# Patient Record
Sex: FEMALE | Race: ASIAN | NOT HISPANIC OR LATINO | Employment: UNEMPLOYED | URBAN - METROPOLITAN AREA
[De-identification: names, ages, dates, MRNs, and addresses within clinical notes are randomized per-mention and may not be internally consistent; named-entity substitution may affect disease eponyms.]

---

## 2017-05-20 ENCOUNTER — COMMUNICATION - HEALTHEAST (OUTPATIENT)
Dept: FAMILY MEDICINE | Facility: CLINIC | Age: 37
End: 2017-05-20

## 2017-05-20 DIAGNOSIS — Z30.9 CONTRACEPTIVE MANAGEMENT: ICD-10-CM

## 2017-09-19 ENCOUNTER — OFFICE VISIT - HEALTHEAST (OUTPATIENT)
Dept: FAMILY MEDICINE | Facility: CLINIC | Age: 37
End: 2017-09-19

## 2017-09-19 DIAGNOSIS — M54.2 NECK PAIN: ICD-10-CM

## 2017-09-19 DIAGNOSIS — Z23 NEED FOR IMMUNIZATION AGAINST INFLUENZA: ICD-10-CM

## 2017-09-19 DIAGNOSIS — R53.83 FATIGUE: ICD-10-CM

## 2017-09-19 ASSESSMENT — MIFFLIN-ST. JEOR: SCORE: 1425.65

## 2018-01-16 ENCOUNTER — COMMUNICATION - HEALTHEAST (OUTPATIENT)
Dept: FAMILY MEDICINE | Facility: CLINIC | Age: 38
End: 2018-01-16

## 2018-01-16 DIAGNOSIS — Z30.9 CONTRACEPTIVE MANAGEMENT: ICD-10-CM

## 2018-02-06 ENCOUNTER — OFFICE VISIT - HEALTHEAST (OUTPATIENT)
Dept: FAMILY MEDICINE | Facility: CLINIC | Age: 38
End: 2018-02-06

## 2018-02-06 DIAGNOSIS — K21.9 GASTROESOPHAGEAL REFLUX DISEASE WITHOUT ESOPHAGITIS: ICD-10-CM

## 2018-02-06 ASSESSMENT — MIFFLIN-ST. JEOR: SCORE: 1421.11

## 2018-02-07 ENCOUNTER — OFFICE VISIT - HEALTHEAST (OUTPATIENT)
Dept: FAMILY MEDICINE | Facility: CLINIC | Age: 38
End: 2018-02-07

## 2018-02-07 DIAGNOSIS — R41.3 MEMORY DIFFICULTIES: ICD-10-CM

## 2018-02-07 DIAGNOSIS — R12 HEARTBURN: ICD-10-CM

## 2018-02-07 ASSESSMENT — MIFFLIN-ST. JEOR: SCORE: 1422.53

## 2018-03-30 ENCOUNTER — OFFICE VISIT - HEALTHEAST (OUTPATIENT)
Dept: FAMILY MEDICINE | Facility: CLINIC | Age: 38
End: 2018-03-30

## 2018-03-30 DIAGNOSIS — R10.13 EPIGASTRIC ABDOMINAL PAIN: ICD-10-CM

## 2018-03-30 ASSESSMENT — MIFFLIN-ST. JEOR: SCORE: 1421.4

## 2018-04-06 ENCOUNTER — AMBULATORY - HEALTHEAST (OUTPATIENT)
Dept: NURSING | Facility: CLINIC | Age: 38
End: 2018-04-06

## 2018-04-13 ENCOUNTER — RECORDS - HEALTHEAST (OUTPATIENT)
Dept: ADMINISTRATIVE | Facility: OTHER | Age: 38
End: 2018-04-13

## 2018-04-16 ENCOUNTER — RECORDS - HEALTHEAST (OUTPATIENT)
Dept: ADMINISTRATIVE | Facility: OTHER | Age: 38
End: 2018-04-16

## 2018-05-30 ENCOUNTER — OFFICE VISIT - HEALTHEAST (OUTPATIENT)
Dept: FAMILY MEDICINE | Facility: CLINIC | Age: 38
End: 2018-05-30

## 2018-05-30 DIAGNOSIS — R12 HEARTBURN: ICD-10-CM

## 2018-05-30 ASSESSMENT — MIFFLIN-ST. JEOR: SCORE: 1436.99

## 2018-11-23 ENCOUNTER — COMMUNICATION - HEALTHEAST (OUTPATIENT)
Dept: FAMILY MEDICINE | Facility: CLINIC | Age: 38
End: 2018-11-23

## 2018-12-05 ENCOUNTER — OFFICE VISIT - HEALTHEAST (OUTPATIENT)
Dept: FAMILY MEDICINE | Facility: CLINIC | Age: 38
End: 2018-12-05

## 2018-12-05 DIAGNOSIS — Z13.220 SCREENING FOR LIPID DISORDERS: ICD-10-CM

## 2018-12-05 DIAGNOSIS — Z00.00 ROUTINE HISTORY AND PHYSICAL EXAMINATION OF ADULT: ICD-10-CM

## 2018-12-05 DIAGNOSIS — M25.511 RIGHT SHOULDER PAIN, UNSPECIFIED CHRONICITY: ICD-10-CM

## 2018-12-05 DIAGNOSIS — K29.40 ATROPHIC GASTRITIS WITHOUT HEMORRHAGE: ICD-10-CM

## 2018-12-05 DIAGNOSIS — H53.8 BLURRED VISION: ICD-10-CM

## 2018-12-05 LAB
CHOLEST SERPL-MCNC: 174 MG/DL
FASTING STATUS PATIENT QL REPORTED: NO
HDLC SERPL-MCNC: 48 MG/DL
LDLC SERPL CALC-MCNC: 111 MG/DL
TRIGL SERPL-MCNC: 77 MG/DL

## 2018-12-05 ASSESSMENT — MIFFLIN-ST. JEOR: SCORE: 1400.42

## 2018-12-15 ENCOUNTER — RECORDS - HEALTHEAST (OUTPATIENT)
Dept: ADMINISTRATIVE | Facility: OTHER | Age: 38
End: 2018-12-15

## 2019-09-30 ENCOUNTER — COMMUNICATION - HEALTHEAST (OUTPATIENT)
Dept: FAMILY MEDICINE | Facility: CLINIC | Age: 39
End: 2019-09-30

## 2019-10-18 ENCOUNTER — AMBULATORY - HEALTHEAST (OUTPATIENT)
Dept: FAMILY MEDICINE | Facility: CLINIC | Age: 39
End: 2019-10-18

## 2019-10-22 ENCOUNTER — OFFICE VISIT - HEALTHEAST (OUTPATIENT)
Dept: FAMILY MEDICINE | Facility: CLINIC | Age: 39
End: 2019-10-22

## 2019-10-22 ENCOUNTER — RECORDS - HEALTHEAST (OUTPATIENT)
Dept: GENERAL RADIOLOGY | Facility: CLINIC | Age: 39
End: 2019-10-22

## 2019-10-22 DIAGNOSIS — R13.19 OTHER DYSPHAGIA: ICD-10-CM

## 2019-10-22 DIAGNOSIS — Z00.00 HEALTHCARE MAINTENANCE: ICD-10-CM

## 2019-10-22 DIAGNOSIS — R76.8 HELICOBACTER PYLORI ANTIBODY POSITIVE: ICD-10-CM

## 2019-10-22 DIAGNOSIS — B18.1 CHRONIC VIRAL HEPATITIS B WITHOUT DELTA AGENT AND WITHOUT COMA (H): ICD-10-CM

## 2019-10-22 DIAGNOSIS — R30.0 DYSURIA: ICD-10-CM

## 2019-10-22 DIAGNOSIS — K21.00 GASTROESOPHAGEAL REFLUX DISEASE WITH ESOPHAGITIS: ICD-10-CM

## 2019-10-22 DIAGNOSIS — R09.A2 FOREIGN BODY SENSATION IN THROAT: ICD-10-CM

## 2019-10-22 DIAGNOSIS — R09.89 OTHER SPECIFIED SYMPTOMS AND SIGNS INVOLVING THE CIRCULATORY AND RESPIRATORY SYSTEMS: ICD-10-CM

## 2019-10-22 LAB
ALBUMIN UR-MCNC: NEGATIVE MG/DL
APPEARANCE UR: ABNORMAL
BACTERIA #/AREA URNS HPF: ABNORMAL HPF
BILIRUB UR QL STRIP: NEGATIVE
CLUE CELLS: NORMAL
COLOR UR AUTO: YELLOW
GLUCOSE UR STRIP-MCNC: NEGATIVE MG/DL
HCG UR QL: NEGATIVE
HGB UR QL STRIP: NEGATIVE
KETONES UR STRIP-MCNC: NEGATIVE MG/DL
LEUKOCYTE ESTERASE UR QL STRIP: NEGATIVE
NITRATE UR QL: NEGATIVE
PH UR STRIP: 6 [PH] (ref 5–8)
RBC #/AREA URNS AUTO: ABNORMAL HPF
SP GR UR STRIP: 1.02 (ref 1–1.03)
SQUAMOUS #/AREA URNS AUTO: ABNORMAL LPF
TRICHOMONAS, WET PREP: NORMAL
UROBILINOGEN UR STRIP-ACNC: ABNORMAL
WBC #/AREA URNS AUTO: ABNORMAL HPF
YEAST, WET PREP: NORMAL

## 2019-10-22 ASSESSMENT — MIFFLIN-ST. JEOR: SCORE: 1364.42

## 2019-10-23 LAB — BACTERIA SPEC CULT: NO GROWTH

## 2019-10-24 ENCOUNTER — COMMUNICATION - HEALTHEAST (OUTPATIENT)
Dept: FAMILY MEDICINE | Facility: CLINIC | Age: 39
End: 2019-10-24

## 2019-11-01 ENCOUNTER — RECORDS - HEALTHEAST (OUTPATIENT)
Dept: ADMINISTRATIVE | Facility: OTHER | Age: 39
End: 2019-11-01

## 2019-11-11 ENCOUNTER — COMMUNICATION - HEALTHEAST (OUTPATIENT)
Dept: FAMILY MEDICINE | Facility: CLINIC | Age: 39
End: 2019-11-11

## 2019-11-17 ENCOUNTER — HOSPITAL ENCOUNTER (OUTPATIENT)
Dept: CT IMAGING | Facility: HOSPITAL | Age: 39
Discharge: HOME | End: 2019-11-17
Attending: INTERNAL MEDICINE

## 2019-11-17 DIAGNOSIS — C16.9 MALIGNANT NEOPLASM OF STOMACH, UNSPECIFIED LOCATION (H): ICD-10-CM

## 2019-11-21 ENCOUNTER — RECORDS - HEALTHEAST (OUTPATIENT)
Dept: ADMINISTRATIVE | Facility: OTHER | Age: 39
End: 2019-11-21

## 2019-11-22 ENCOUNTER — COMMUNICATION - HEALTHEAST (OUTPATIENT)
Dept: INTERVENTIONAL RADIOLOGY/VASCULAR | Facility: HOSPITAL | Age: 39
End: 2019-11-22

## 2019-11-22 ENCOUNTER — HOSPITAL ENCOUNTER (OUTPATIENT)
Dept: INTERVENTIONAL RADIOLOGY/VASCULAR | Facility: HOSPITAL | Age: 39
Discharge: HOME | End: 2019-11-22
Attending: INTERNAL MEDICINE | Admitting: RADIOLOGY

## 2019-11-22 DIAGNOSIS — C16.9 GASTRIC CANCER (H): ICD-10-CM

## 2019-11-22 LAB — HCG UR QL: NEGATIVE

## 2019-11-22 ASSESSMENT — MIFFLIN-ST. JEOR: SCORE: 1362.15

## 2019-12-13 ENCOUNTER — COMMUNICATION - HEALTHEAST (OUTPATIENT)
Dept: NURSING | Facility: CLINIC | Age: 39
End: 2019-12-13

## 2019-12-13 ENCOUNTER — OFFICE VISIT - HEALTHEAST (OUTPATIENT)
Dept: FAMILY MEDICINE | Facility: CLINIC | Age: 39
End: 2019-12-13

## 2019-12-13 DIAGNOSIS — C16.9 MALIGNANT NEOPLASM OF STOMACH, UNSPECIFIED LOCATION (H): ICD-10-CM

## 2019-12-13 DIAGNOSIS — Z00.00 ROUTINE GENERAL MEDICAL EXAMINATION AT A HEALTH CARE FACILITY: ICD-10-CM

## 2019-12-13 DIAGNOSIS — Z23 NEED FOR INFLUENZA VACCINATION: ICD-10-CM

## 2019-12-13 ASSESSMENT — MIFFLIN-ST. JEOR: SCORE: 1353.67

## 2019-12-13 ASSESSMENT — PATIENT HEALTH QUESTIONNAIRE - PHQ9: SUM OF ALL RESPONSES TO PHQ QUESTIONS 1-9: 24

## 2019-12-17 ENCOUNTER — COMMUNICATION - HEALTHEAST (OUTPATIENT)
Dept: CARE COORDINATION | Facility: CLINIC | Age: 39
End: 2019-12-17

## 2019-12-17 ENCOUNTER — AMBULATORY - HEALTHEAST (OUTPATIENT)
Dept: NURSING | Facility: CLINIC | Age: 39
End: 2019-12-17

## 2019-12-17 ASSESSMENT — ACTIVITIES OF DAILY LIVING (ADL): DEPENDENT_IADLS:: INDEPENDENT

## 2019-12-31 ENCOUNTER — RECORDS - HEALTHEAST (OUTPATIENT)
Dept: ADMINISTRATIVE | Facility: OTHER | Age: 39
End: 2019-12-31

## 2020-01-07 ENCOUNTER — RECORDS - HEALTHEAST (OUTPATIENT)
Dept: MAMMOGRAPHY | Facility: CLINIC | Age: 40
End: 2020-01-07

## 2020-01-07 DIAGNOSIS — Z12.31 ENCOUNTER FOR SCREENING MAMMOGRAM FOR MALIGNANT NEOPLASM OF BREAST: ICD-10-CM

## 2020-01-09 ENCOUNTER — COMMUNICATION - HEALTHEAST (OUTPATIENT)
Dept: NURSING | Facility: CLINIC | Age: 40
End: 2020-01-09

## 2020-01-10 ENCOUNTER — AMBULATORY - HEALTHEAST (OUTPATIENT)
Dept: NURSING | Facility: CLINIC | Age: 40
End: 2020-01-10

## 2020-01-10 ENCOUNTER — COMMUNICATION - HEALTHEAST (OUTPATIENT)
Dept: NURSING | Facility: CLINIC | Age: 40
End: 2020-01-10

## 2020-01-21 ENCOUNTER — COMMUNICATION - HEALTHEAST (OUTPATIENT)
Dept: FAMILY MEDICINE | Facility: CLINIC | Age: 40
End: 2020-01-21

## 2020-01-27 ENCOUNTER — OFFICE VISIT - HEALTHEAST (OUTPATIENT)
Dept: FAMILY MEDICINE | Facility: CLINIC | Age: 40
End: 2020-01-27

## 2020-01-27 DIAGNOSIS — Z60.3 LANGUAGE BARRIER AFFECTING HEALTH CARE: ICD-10-CM

## 2020-01-27 DIAGNOSIS — R76.8 HELICOBACTER PYLORI ANTIBODY POSITIVE: ICD-10-CM

## 2020-01-27 DIAGNOSIS — K92.2 GASTROINTESTINAL HEMORRHAGE, UNSPECIFIED GASTROINTESTINAL HEMORRHAGE TYPE: ICD-10-CM

## 2020-01-27 DIAGNOSIS — Z00.00 HEALTHCARE MAINTENANCE: ICD-10-CM

## 2020-01-27 DIAGNOSIS — Z75.8 LANGUAGE BARRIER AFFECTING HEALTH CARE: ICD-10-CM

## 2020-01-27 DIAGNOSIS — B18.1 CHRONIC VIRAL HEPATITIS B WITHOUT DELTA AGENT AND WITHOUT COMA (H): ICD-10-CM

## 2020-01-27 DIAGNOSIS — C16.9 MALIGNANT NEOPLASM OF STOMACH, UNSPECIFIED LOCATION (H): ICD-10-CM

## 2020-01-27 DIAGNOSIS — D62 ANEMIA DUE TO BLOOD LOSS, ACUTE: ICD-10-CM

## 2020-01-27 DIAGNOSIS — Z09 HOSPITAL DISCHARGE FOLLOW-UP: ICD-10-CM

## 2020-01-27 LAB
ALBUMIN SERPL-MCNC: 3.1 G/DL (ref 3.5–5)
ALP SERPL-CCNC: 80 U/L (ref 45–120)
ALT SERPL W P-5'-P-CCNC: 88 U/L (ref 0–45)
ANION GAP SERPL CALCULATED.3IONS-SCNC: 7 MMOL/L (ref 5–18)
AST SERPL W P-5'-P-CCNC: 68 U/L (ref 0–40)
BASOPHILS # BLD AUTO: 0 THOU/UL (ref 0–0.2)
BASOPHILS NFR BLD AUTO: 0 % (ref 0–2)
BILIRUB DIRECT SERPL-MCNC: 0.1 MG/DL
BILIRUB SERPL-MCNC: 0.3 MG/DL (ref 0–1)
BUN SERPL-MCNC: 6 MG/DL (ref 8–22)
CALCIUM SERPL-MCNC: 8.3 MG/DL (ref 8.5–10.5)
CHLORIDE BLD-SCNC: 111 MMOL/L (ref 98–107)
CO2 SERPL-SCNC: 24 MMOL/L (ref 22–31)
CREAT SERPL-MCNC: 0.54 MG/DL (ref 0.6–1.1)
EOSINOPHIL COUNT (ABSOLUTE): 0.1 THOU/UL (ref 0–0.4)
EOSINOPHIL NFR BLD AUTO: 3 % (ref 0–6)
ERYTHROCYTE [DISTWIDTH] IN BLOOD BY AUTOMATED COUNT: 19.9 % (ref 11–14.5)
GFR SERPL CREATININE-BSD FRML MDRD: >60 ML/MIN/1.73M2
GLUCOSE BLD-MCNC: 99 MG/DL (ref 70–125)
HCT VFR BLD AUTO: 28.3 % (ref 35–47)
HGB BLD-MCNC: 8.7 G/DL (ref 12–16)
HIV 1+2 AB+HIV1 P24 AG SERPL QL IA: NEGATIVE
LYMPHOCYTES # BLD AUTO: 1 THOU/UL (ref 0.8–4.4)
LYMPHOCYTES NFR BLD AUTO: 29 % (ref 20–40)
MCH RBC QN AUTO: 23.9 PG (ref 27–34)
MCHC RBC AUTO-ENTMCNC: 30.7 G/DL (ref 32–36)
MCV RBC AUTO: 78 FL (ref 80–100)
MONOCYTES # BLD AUTO: 1.1 THOU/UL (ref 0–0.9)
MONOCYTES NFR BLD AUTO: 30 % (ref 2–10)
OVALOCYTES: ABNORMAL
PLAT MORPH BLD: ABNORMAL
PLATELET # BLD AUTO: 124 THOU/UL (ref 140–440)
PMV BLD AUTO: ABNORMAL FL
POLYCHROMASIA BLD QL SMEAR: ABNORMAL
POTASSIUM BLD-SCNC: 3.9 MMOL/L (ref 3.5–5)
PROT SERPL-MCNC: 5.4 G/DL (ref 6–8)
RBC # BLD AUTO: 3.64 MILL/UL (ref 3.8–5.4)
SCHISTOCYTES: ABNORMAL
SODIUM SERPL-SCNC: 142 MMOL/L (ref 136–145)
TOTAL NEUTROPHILS-ABS(DIFF): 1.3 THOU/UL (ref 2–7.7)
TOTAL NEUTROPHILS-REL(DIFF): 38 % (ref 50–70)
WBC: 3.5 THOU/UL (ref 4–11)

## 2020-01-27 SDOH — SOCIAL STABILITY - SOCIAL INSECURITY: ACCULTURATION DIFFICULTY: Z60.3

## 2020-01-27 ASSESSMENT — MIFFLIN-ST. JEOR: SCORE: 1318.93

## 2020-01-28 ENCOUNTER — RECORDS - HEALTHEAST (OUTPATIENT)
Dept: ADMINISTRATIVE | Facility: OTHER | Age: 40
End: 2020-01-28

## 2020-01-29 ENCOUNTER — COMMUNICATION - HEALTHEAST (OUTPATIENT)
Dept: ADMINISTRATIVE | Facility: CLINIC | Age: 40
End: 2020-01-29

## 2020-01-29 LAB
HBV CORE AB SERPL QL IA: POSITIVE
HEPATITIS B SURFACE ANTIBODY LHE- HISTORICAL: NEGATIVE

## 2020-02-12 ENCOUNTER — OFFICE VISIT - HEALTHEAST (OUTPATIENT)
Dept: FAMILY MEDICINE | Facility: CLINIC | Age: 40
End: 2020-02-12

## 2020-02-12 DIAGNOSIS — C16.9 MALIGNANT NEOPLASM OF STOMACH, UNSPECIFIED LOCATION (H): ICD-10-CM

## 2020-02-12 DIAGNOSIS — Z01.818 PREOPERATIVE EXAMINATION: ICD-10-CM

## 2020-02-12 ASSESSMENT — MIFFLIN-ST. JEOR: SCORE: 1359.23

## 2020-02-13 ENCOUNTER — RECORDS - HEALTHEAST (OUTPATIENT)
Dept: ADMINISTRATIVE | Facility: OTHER | Age: 40
End: 2020-02-13

## 2020-02-13 ENCOUNTER — COMMUNICATION - HEALTHEAST (OUTPATIENT)
Dept: NURSING | Facility: CLINIC | Age: 40
End: 2020-02-13

## 2020-02-25 ENCOUNTER — RECORDS - HEALTHEAST (OUTPATIENT)
Dept: ADMINISTRATIVE | Facility: OTHER | Age: 40
End: 2020-02-25

## 2020-02-28 ENCOUNTER — COMMUNICATION - HEALTHEAST (OUTPATIENT)
Dept: FAMILY MEDICINE | Facility: CLINIC | Age: 40
End: 2020-02-28

## 2020-02-28 ENCOUNTER — OFFICE VISIT - HEALTHEAST (OUTPATIENT)
Dept: FAMILY MEDICINE | Facility: CLINIC | Age: 40
End: 2020-02-28

## 2020-02-28 DIAGNOSIS — C16.9 GASTRIC ADENOCARCINOMA (H): ICD-10-CM

## 2020-02-28 DIAGNOSIS — Z01.818 PREOPERATIVE EXAMINATION: ICD-10-CM

## 2020-02-28 DIAGNOSIS — D50.9 IRON DEFICIENCY ANEMIA, UNSPECIFIED IRON DEFICIENCY ANEMIA TYPE: ICD-10-CM

## 2020-02-28 LAB
ANION GAP SERPL CALCULATED.3IONS-SCNC: 11 MMOL/L (ref 5–18)
BUN SERPL-MCNC: 11 MG/DL (ref 8–22)
CALCIUM SERPL-MCNC: 9.4 MG/DL (ref 8.5–10.5)
CHLORIDE BLD-SCNC: 107 MMOL/L (ref 98–107)
CO2 SERPL-SCNC: 25 MMOL/L (ref 22–31)
CREAT SERPL-MCNC: 0.63 MG/DL (ref 0.6–1.1)
GFR SERPL CREATININE-BSD FRML MDRD: >60 ML/MIN/1.73M2
GLUCOSE BLD-MCNC: 91 MG/DL (ref 70–125)
HCG UR QL: NEGATIVE
HGB BLD-MCNC: 10.8 G/DL (ref 12–16)
POTASSIUM BLD-SCNC: 4.1 MMOL/L (ref 3.5–5)
SODIUM SERPL-SCNC: 143 MMOL/L (ref 136–145)

## 2020-02-28 ASSESSMENT — MIFFLIN-ST. JEOR: SCORE: 1360.16

## 2020-03-02 ENCOUNTER — ANESTHESIA EVENT (OUTPATIENT)
Dept: SURGERY | Facility: CLINIC | Age: 40
End: 2020-03-02
Payer: COMMERCIAL

## 2020-03-02 ENCOUNTER — COMMUNICATION - HEALTHEAST (OUTPATIENT)
Dept: FAMILY MEDICINE | Facility: CLINIC | Age: 40
End: 2020-03-02

## 2020-03-02 SDOH — HEALTH STABILITY: MENTAL HEALTH: HOW OFTEN DO YOU HAVE A DRINK CONTAINING ALCOHOL?: NEVER

## 2020-03-03 ENCOUNTER — ANESTHESIA (OUTPATIENT)
Dept: SURGERY | Facility: CLINIC | Age: 40
End: 2020-03-03
Payer: COMMERCIAL

## 2020-03-03 ENCOUNTER — HOSPITAL ENCOUNTER (INPATIENT)
Facility: CLINIC | Age: 40
LOS: 8 days | Discharge: HOME | End: 2020-03-11
Attending: THORACIC SURGERY (CARDIOTHORACIC VASCULAR SURGERY) | Admitting: THORACIC SURGERY (CARDIOTHORACIC VASCULAR SURGERY)
Payer: COMMERCIAL

## 2020-03-03 DIAGNOSIS — G89.18 ACUTE POST-OPERATIVE PAIN: Primary | ICD-10-CM

## 2020-03-03 DIAGNOSIS — K59.03 DRUG-INDUCED CONSTIPATION: ICD-10-CM

## 2020-03-03 PROBLEM — C16.9 GASTRIC CARCINOMA (H): Status: ACTIVE | Noted: 2020-03-03

## 2020-03-03 LAB
ABO + RH BLD: NORMAL
ABO + RH BLD: NORMAL
ANION GAP SERPL CALCULATED.3IONS-SCNC: 4 MMOL/L (ref 3–14)
BLD GP AB SCN SERPL QL: NORMAL
BLOOD BANK CMNT PATIENT-IMP: NORMAL
BUN SERPL-MCNC: 10 MG/DL (ref 7–30)
CALCIUM SERPL-MCNC: 8.9 MG/DL (ref 8.5–10.1)
CHLORIDE SERPL-SCNC: 110 MMOL/L (ref 94–109)
CO2 SERPL-SCNC: 27 MMOL/L (ref 20–32)
CREAT SERPL-MCNC: 0.57 MG/DL (ref 0.52–1.04)
ERYTHROCYTE [DISTWIDTH] IN BLOOD BY AUTOMATED COUNT: 16.1 % (ref 10–15)
GFR SERPL CREATININE-BSD FRML MDRD: >90 ML/MIN/{1.73_M2}
GLUCOSE SERPL-MCNC: 81 MG/DL (ref 70–99)
HCG UR QL: NEGATIVE
HCT VFR BLD AUTO: 33.5 % (ref 35–47)
HGB BLD-MCNC: 10.6 G/DL (ref 11.7–15.7)
INR PPP: 1.08 (ref 0.86–1.14)
MCH RBC QN AUTO: 23.4 PG (ref 26.5–33)
MCHC RBC AUTO-ENTMCNC: 31.6 G/DL (ref 31.5–36.5)
MCV RBC AUTO: 74 FL (ref 78–100)
PLATELET # BLD AUTO: 176 10E9/L (ref 150–450)
POTASSIUM SERPL-SCNC: 3.3 MMOL/L (ref 3.4–5.3)
RBC # BLD AUTO: 4.53 10E12/L (ref 3.8–5.2)
SODIUM SERPL-SCNC: 141 MMOL/L (ref 133–144)
SPECIMEN EXP DATE BLD: NORMAL
WBC # BLD AUTO: 5.9 10E9/L (ref 4–11)

## 2020-03-03 PROCEDURE — 25000128 H RX IP 250 OP 636: Performed by: THORACIC SURGERY (CARDIOTHORACIC VASCULAR SURGERY)

## 2020-03-03 PROCEDURE — 25000125 ZZHC RX 250: Performed by: THORACIC SURGERY (CARDIOTHORACIC VASCULAR SURGERY)

## 2020-03-03 PROCEDURE — 88309 TISSUE EXAM BY PATHOLOGIST: CPT | Performed by: THORACIC SURGERY (CARDIOTHORACIC VASCULAR SURGERY)

## 2020-03-03 PROCEDURE — 71000012 ZZH RECOVERY PHASE 1 LEVEL 1 FIRST HR: Performed by: THORACIC SURGERY (CARDIOTHORACIC VASCULAR SURGERY)

## 2020-03-03 PROCEDURE — 25000566 ZZH SEVOFLURANE, EA 15 MIN: Performed by: THORACIC SURGERY (CARDIOTHORACIC VASCULAR SURGERY)

## 2020-03-03 PROCEDURE — 25000128 H RX IP 250 OP 636: Performed by: ANESTHESIOLOGY

## 2020-03-03 PROCEDURE — 88341 IMHCHEM/IMCYTCHM EA ADD ANTB: CPT | Performed by: THORACIC SURGERY (CARDIOTHORACIC VASCULAR SURGERY)

## 2020-03-03 PROCEDURE — 80048 BASIC METABOLIC PNL TOTAL CA: CPT | Performed by: THORACIC SURGERY (CARDIOTHORACIC VASCULAR SURGERY)

## 2020-03-03 PROCEDURE — 00000158 ZZHCL STATISTIC H-FISH PROCESS B/S: Performed by: THORACIC SURGERY (CARDIOTHORACIC VASCULAR SURGERY)

## 2020-03-03 PROCEDURE — 25800030 ZZH RX IP 258 OP 636: Performed by: PHYSICIAN ASSISTANT

## 2020-03-03 PROCEDURE — 25000128 H RX IP 250 OP 636: Performed by: NURSE ANESTHETIST, CERTIFIED REGISTERED

## 2020-03-03 PROCEDURE — 88309 TISSUE EXAM BY PATHOLOGIST: CPT | Mod: 26 | Performed by: THORACIC SURGERY (CARDIOTHORACIC VASCULAR SURGERY)

## 2020-03-03 PROCEDURE — 37000009 ZZH ANESTHESIA TECHNICAL FEE, EACH ADDTL 15 MIN: Performed by: THORACIC SURGERY (CARDIOTHORACIC VASCULAR SURGERY)

## 2020-03-03 PROCEDURE — 71000013 ZZH RECOVERY PHASE 1 LEVEL 1 EA ADDTL HR: Performed by: THORACIC SURGERY (CARDIOTHORACIC VASCULAR SURGERY)

## 2020-03-03 PROCEDURE — 88377 M/PHMTRC ALYS ISHQUANT/SEMIQ: CPT | Performed by: PATHOLOGY

## 2020-03-03 PROCEDURE — 00000159 ZZHCL STATISTIC H-SEND OUTS PREP: Performed by: THORACIC SURGERY (CARDIOTHORACIC VASCULAR SURGERY)

## 2020-03-03 PROCEDURE — 25800030 ZZH RX IP 258 OP 636: Performed by: ANESTHESIOLOGY

## 2020-03-03 PROCEDURE — 25000125 ZZHC RX 250: Performed by: NURSE ANESTHETIST, CERTIFIED REGISTERED

## 2020-03-03 PROCEDURE — 88342 IMHCHEM/IMCYTCHM 1ST ANTB: CPT | Performed by: THORACIC SURGERY (CARDIOTHORACIC VASCULAR SURGERY)

## 2020-03-03 PROCEDURE — 25800030 ZZH RX IP 258 OP 636: Performed by: NURSE ANESTHETIST, CERTIFIED REGISTERED

## 2020-03-03 PROCEDURE — 86900 BLOOD TYPING SEROLOGIC ABO: CPT | Performed by: THORACIC SURGERY (CARDIOTHORACIC VASCULAR SURGERY)

## 2020-03-03 PROCEDURE — 36000069 ZZH SURGERY LEVEL 5 EA 15 ADDTL MIN: Performed by: THORACIC SURGERY (CARDIOTHORACIC VASCULAR SURGERY)

## 2020-03-03 PROCEDURE — 86850 RBC ANTIBODY SCREEN: CPT | Performed by: THORACIC SURGERY (CARDIOTHORACIC VASCULAR SURGERY)

## 2020-03-03 PROCEDURE — 12000000 ZZH R&B MED SURG/OB

## 2020-03-03 PROCEDURE — 81025 URINE PREGNANCY TEST: CPT | Performed by: ANESTHESIOLOGY

## 2020-03-03 PROCEDURE — 25000125 ZZHC RX 250: Performed by: ANESTHESIOLOGY

## 2020-03-03 PROCEDURE — 36415 COLL VENOUS BLD VENIPUNCTURE: CPT | Performed by: THORACIC SURGERY (CARDIOTHORACIC VASCULAR SURGERY)

## 2020-03-03 PROCEDURE — 85610 PROTHROMBIN TIME: CPT | Performed by: THORACIC SURGERY (CARDIOTHORACIC VASCULAR SURGERY)

## 2020-03-03 PROCEDURE — 88342 IMHCHEM/IMCYTCHM 1ST ANTB: CPT | Mod: 26 | Performed by: THORACIC SURGERY (CARDIOTHORACIC VASCULAR SURGERY)

## 2020-03-03 PROCEDURE — 27210794 ZZH OR GENERAL SUPPLY STERILE: Performed by: THORACIC SURGERY (CARDIOTHORACIC VASCULAR SURGERY)

## 2020-03-03 PROCEDURE — 86901 BLOOD TYPING SEROLOGIC RH(D): CPT | Performed by: THORACIC SURGERY (CARDIOTHORACIC VASCULAR SURGERY)

## 2020-03-03 PROCEDURE — 0D160ZA BYPASS STOMACH TO JEJUNUM, OPEN APPROACH: ICD-10-PCS | Performed by: THORACIC SURGERY (CARDIOTHORACIC VASCULAR SURGERY)

## 2020-03-03 PROCEDURE — 85027 COMPLETE CBC AUTOMATED: CPT | Performed by: THORACIC SURGERY (CARDIOTHORACIC VASCULAR SURGERY)

## 2020-03-03 PROCEDURE — 37000008 ZZH ANESTHESIA TECHNICAL FEE, 1ST 30 MIN: Performed by: THORACIC SURGERY (CARDIOTHORACIC VASCULAR SURGERY)

## 2020-03-03 PROCEDURE — 36000067 ZZH SURGERY LEVEL 5 1ST 30 MIN: Performed by: THORACIC SURGERY (CARDIOTHORACIC VASCULAR SURGERY)

## 2020-03-03 PROCEDURE — 25000128 H RX IP 250 OP 636: Performed by: PHYSICIAN ASSISTANT

## 2020-03-03 PROCEDURE — 40000170 ZZH STATISTIC PRE-PROCEDURE ASSESSMENT II: Performed by: THORACIC SURGERY (CARDIOTHORACIC VASCULAR SURGERY)

## 2020-03-03 PROCEDURE — 0DB60ZZ EXCISION OF STOMACH, OPEN APPROACH: ICD-10-PCS | Performed by: THORACIC SURGERY (CARDIOTHORACIC VASCULAR SURGERY)

## 2020-03-03 PROCEDURE — 88341 IMHCHEM/IMCYTCHM EA ADD ANTB: CPT | Mod: 26 | Performed by: THORACIC SURGERY (CARDIOTHORACIC VASCULAR SURGERY)

## 2020-03-03 RX ORDER — DEXAMETHASONE 4 MG/1
8 TABLET ORAL DAILY PRN
Status: ON HOLD | COMMUNITY
End: 2020-03-04

## 2020-03-03 RX ORDER — ONDANSETRON 2 MG/ML
INJECTION INTRAMUSCULAR; INTRAVENOUS PRN
Status: DISCONTINUED | OUTPATIENT
Start: 2020-03-03 | End: 2020-03-03

## 2020-03-03 RX ORDER — CEFAZOLIN SODIUM 1 G/3ML
1 INJECTION, POWDER, FOR SOLUTION INTRAMUSCULAR; INTRAVENOUS SEE ADMIN INSTRUCTIONS
Status: DISCONTINUED | OUTPATIENT
Start: 2020-03-03 | End: 2020-03-03 | Stop reason: HOSPADM

## 2020-03-03 RX ORDER — LIDOCAINE 40 MG/G
CREAM TOPICAL
Status: DISCONTINUED | OUTPATIENT
Start: 2020-03-03 | End: 2020-03-03

## 2020-03-03 RX ORDER — HYDROMORPHONE HYDROCHLORIDE 1 MG/ML
.3-.5 INJECTION, SOLUTION INTRAMUSCULAR; INTRAVENOUS; SUBCUTANEOUS
Status: DISCONTINUED | OUTPATIENT
Start: 2020-03-03 | End: 2020-03-10

## 2020-03-03 RX ORDER — PROPOFOL 10 MG/ML
INJECTION, EMULSION INTRAVENOUS PRN
Status: DISCONTINUED | OUTPATIENT
Start: 2020-03-03 | End: 2020-03-03

## 2020-03-03 RX ORDER — GLYCOPYRROLATE 0.2 MG/ML
INJECTION, SOLUTION INTRAMUSCULAR; INTRAVENOUS PRN
Status: DISCONTINUED | OUTPATIENT
Start: 2020-03-03 | End: 2020-03-03

## 2020-03-03 RX ORDER — ONDANSETRON 4 MG/1
4 TABLET, ORALLY DISINTEGRATING ORAL EVERY 30 MIN PRN
Status: DISCONTINUED | OUTPATIENT
Start: 2020-03-03 | End: 2020-03-03 | Stop reason: HOSPADM

## 2020-03-03 RX ORDER — MAGNESIUM HYDROXIDE 1200 MG/15ML
LIQUID ORAL PRN
Status: DISCONTINUED | OUTPATIENT
Start: 2020-03-03 | End: 2020-03-03 | Stop reason: HOSPADM

## 2020-03-03 RX ORDER — SODIUM CHLORIDE 9 MG/ML
INJECTION, SOLUTION INTRAVENOUS CONTINUOUS
Status: DISCONTINUED | OUTPATIENT
Start: 2020-03-03 | End: 2020-03-11 | Stop reason: HOSPADM

## 2020-03-03 RX ORDER — NEOSTIGMINE METHYLSULFATE 1 MG/ML
VIAL (ML) INJECTION PRN
Status: DISCONTINUED | OUTPATIENT
Start: 2020-03-03 | End: 2020-03-03

## 2020-03-03 RX ORDER — NITROGLYCERIN 0.4 MG/1
0.4 TABLET SUBLINGUAL EVERY 5 MIN PRN
Status: DISCONTINUED | OUTPATIENT
Start: 2020-03-03 | End: 2020-03-05

## 2020-03-03 RX ORDER — DIPHENHYDRAMINE HCL 25 MG
25 CAPSULE ORAL EVERY 6 HOURS PRN
Status: DISCONTINUED | OUTPATIENT
Start: 2020-03-03 | End: 2020-03-11 | Stop reason: HOSPADM

## 2020-03-03 RX ORDER — SODIUM CHLORIDE, SODIUM LACTATE, POTASSIUM CHLORIDE, CALCIUM CHLORIDE 600; 310; 30; 20 MG/100ML; MG/100ML; MG/100ML; MG/100ML
INJECTION, SOLUTION INTRAVENOUS CONTINUOUS
Status: DISCONTINUED | OUTPATIENT
Start: 2020-03-03 | End: 2020-03-03 | Stop reason: HOSPADM

## 2020-03-03 RX ORDER — HYDROMORPHONE HYDROCHLORIDE 1 MG/ML
.3-.5 INJECTION, SOLUTION INTRAMUSCULAR; INTRAVENOUS; SUBCUTANEOUS EVERY 5 MIN PRN
Status: DISCONTINUED | OUTPATIENT
Start: 2020-03-03 | End: 2020-03-03 | Stop reason: HOSPADM

## 2020-03-03 RX ORDER — LIDOCAINE 40 MG/G
CREAM TOPICAL
Status: DISCONTINUED | OUTPATIENT
Start: 2020-03-03 | End: 2020-03-11 | Stop reason: HOSPADM

## 2020-03-03 RX ORDER — PROCHLORPERAZINE MALEATE 10 MG
10 TABLET ORAL EVERY 6 HOURS PRN
COMMUNITY
End: 2021-12-03

## 2020-03-03 RX ORDER — CEFAZOLIN SODIUM 2 G/100ML
2 INJECTION, SOLUTION INTRAVENOUS
Status: COMPLETED | OUTPATIENT
Start: 2020-03-03 | End: 2020-03-03

## 2020-03-03 RX ORDER — FENTANYL CITRATE 50 UG/ML
25-50 INJECTION, SOLUTION INTRAMUSCULAR; INTRAVENOUS
Status: DISCONTINUED | OUTPATIENT
Start: 2020-03-03 | End: 2020-03-03 | Stop reason: HOSPADM

## 2020-03-03 RX ORDER — ONDANSETRON 2 MG/ML
4 INJECTION INTRAMUSCULAR; INTRAVENOUS EVERY 30 MIN PRN
Status: DISCONTINUED | OUTPATIENT
Start: 2020-03-03 | End: 2020-03-03 | Stop reason: HOSPADM

## 2020-03-03 RX ORDER — PROCHLORPERAZINE MALEATE 5 MG
10 TABLET ORAL EVERY 6 HOURS PRN
Status: DISCONTINUED | OUTPATIENT
Start: 2020-03-03 | End: 2020-03-11 | Stop reason: HOSPADM

## 2020-03-03 RX ORDER — FENTANYL CITRATE 50 UG/ML
INJECTION, SOLUTION INTRAMUSCULAR; INTRAVENOUS PRN
Status: DISCONTINUED | OUTPATIENT
Start: 2020-03-03 | End: 2020-03-03

## 2020-03-03 RX ORDER — DIPHENHYDRAMINE HYDROCHLORIDE 50 MG/ML
25 INJECTION INTRAMUSCULAR; INTRAVENOUS EVERY 6 HOURS PRN
Status: DISCONTINUED | OUTPATIENT
Start: 2020-03-03 | End: 2020-03-11 | Stop reason: HOSPADM

## 2020-03-03 RX ORDER — DEXAMETHASONE SODIUM PHOSPHATE 4 MG/ML
INJECTION, SOLUTION INTRA-ARTICULAR; INTRALESIONAL; INTRAMUSCULAR; INTRAVENOUS; SOFT TISSUE PRN
Status: DISCONTINUED | OUTPATIENT
Start: 2020-03-03 | End: 2020-03-03

## 2020-03-03 RX ORDER — SODIUM CHLORIDE, SODIUM LACTATE, POTASSIUM CHLORIDE, CALCIUM CHLORIDE 600; 310; 30; 20 MG/100ML; MG/100ML; MG/100ML; MG/100ML
INJECTION, SOLUTION INTRAVENOUS CONTINUOUS PRN
Status: DISCONTINUED | OUTPATIENT
Start: 2020-03-03 | End: 2020-03-03

## 2020-03-03 RX ORDER — NICOTINE POLACRILEX 4 MG/1
20 GUM, CHEWING ORAL 2 TIMES DAILY
COMMUNITY
End: 2021-12-03

## 2020-03-03 RX ORDER — NALOXONE HYDROCHLORIDE 0.4 MG/ML
.1-.4 INJECTION, SOLUTION INTRAMUSCULAR; INTRAVENOUS; SUBCUTANEOUS
Status: DISCONTINUED | OUTPATIENT
Start: 2020-03-03 | End: 2020-03-03

## 2020-03-03 RX ORDER — ONDANSETRON 4 MG/1
4 TABLET, ORALLY DISINTEGRATING ORAL EVERY 6 HOURS PRN
Status: DISCONTINUED | OUTPATIENT
Start: 2020-03-03 | End: 2020-03-11 | Stop reason: HOSPADM

## 2020-03-03 RX ORDER — NALOXONE HYDROCHLORIDE 0.4 MG/ML
.1-.4 INJECTION, SOLUTION INTRAMUSCULAR; INTRAVENOUS; SUBCUTANEOUS
Status: DISCONTINUED | OUTPATIENT
Start: 2020-03-03 | End: 2020-03-11 | Stop reason: HOSPADM

## 2020-03-03 RX ORDER — ONDANSETRON 2 MG/ML
4 INJECTION INTRAMUSCULAR; INTRAVENOUS EVERY 6 HOURS PRN
Status: DISCONTINUED | OUTPATIENT
Start: 2020-03-03 | End: 2020-03-11 | Stop reason: HOSPADM

## 2020-03-03 RX ORDER — DEXAMETHASONE 4 MG/1
8 TABLET ORAL SEE ADMIN INSTRUCTIONS
COMMUNITY
End: 2021-11-15

## 2020-03-03 RX ORDER — LIDOCAINE HYDROCHLORIDE 20 MG/ML
INJECTION, SOLUTION INFILTRATION; PERINEURAL PRN
Status: DISCONTINUED | OUTPATIENT
Start: 2020-03-03 | End: 2020-03-03

## 2020-03-03 RX ADMIN — GLYCOPYRROLATE 0.5 MG: 0.2 INJECTION, SOLUTION INTRAMUSCULAR; INTRAVENOUS at 15:11

## 2020-03-03 RX ADMIN — MIDAZOLAM 2 MG: 1 INJECTION INTRAMUSCULAR; INTRAVENOUS at 12:31

## 2020-03-03 RX ADMIN — FENTANYL CITRATE 50 MCG: 50 INJECTION, SOLUTION INTRAMUSCULAR; INTRAVENOUS at 16:15

## 2020-03-03 RX ADMIN — PHENYLEPHRINE HYDROCHLORIDE 50 MCG: 10 INJECTION INTRAVENOUS at 13:19

## 2020-03-03 RX ADMIN — LIDOCAINE HYDROCHLORIDE 100 MG: 20 INJECTION, SOLUTION INFILTRATION; PERINEURAL at 12:34

## 2020-03-03 RX ADMIN — FENTANYL CITRATE 50 MCG: 50 INJECTION, SOLUTION INTRAMUSCULAR; INTRAVENOUS at 16:41

## 2020-03-03 RX ADMIN — HYDROMORPHONE HYDROCHLORIDE 0.5 MG: 1 INJECTION, SOLUTION INTRAMUSCULAR; INTRAVENOUS; SUBCUTANEOUS at 18:40

## 2020-03-03 RX ADMIN — PHENYLEPHRINE HYDROCHLORIDE 100 MCG: 10 INJECTION INTRAVENOUS at 13:21

## 2020-03-03 RX ADMIN — ROCURONIUM BROMIDE 40 MG: 10 INJECTION INTRAVENOUS at 12:35

## 2020-03-03 RX ADMIN — ROCURONIUM BROMIDE 10 MG: 10 INJECTION INTRAVENOUS at 14:45

## 2020-03-03 RX ADMIN — HYDROMORPHONE HYDROCHLORIDE 0.5 MG: 1 INJECTION, SOLUTION INTRAMUSCULAR; INTRAVENOUS; SUBCUTANEOUS at 20:42

## 2020-03-03 RX ADMIN — CEFAZOLIN SODIUM 2 G: 2 INJECTION, SOLUTION INTRAVENOUS at 12:42

## 2020-03-03 RX ADMIN — HYDROMORPHONE HYDROCHLORIDE 0.5 MG: 1 INJECTION, SOLUTION INTRAMUSCULAR; INTRAVENOUS; SUBCUTANEOUS at 16:25

## 2020-03-03 RX ADMIN — PHENYLEPHRINE HYDROCHLORIDE 100 MCG: 10 INJECTION INTRAVENOUS at 14:52

## 2020-03-03 RX ADMIN — CEFAZOLIN SODIUM 1 G: 2 INJECTION, SOLUTION INTRAVENOUS at 14:42

## 2020-03-03 RX ADMIN — HYDROMORPHONE HYDROCHLORIDE 0.2 MG: 1 INJECTION, SOLUTION INTRAMUSCULAR; INTRAVENOUS; SUBCUTANEOUS at 14:35

## 2020-03-03 RX ADMIN — SODIUM CHLORIDE: 9 INJECTION, SOLUTION INTRAVENOUS at 18:43

## 2020-03-03 RX ADMIN — SODIUM CHLORIDE, POTASSIUM CHLORIDE, SODIUM LACTATE AND CALCIUM CHLORIDE: 600; 310; 30; 20 INJECTION, SOLUTION INTRAVENOUS at 12:04

## 2020-03-03 RX ADMIN — FENTANYL CITRATE 100 MCG: 50 INJECTION, SOLUTION INTRAMUSCULAR; INTRAVENOUS at 12:34

## 2020-03-03 RX ADMIN — NEOSTIGMINE METHYLSULFATE 3.5 MG: 1 INJECTION, SOLUTION INTRAVENOUS at 15:11

## 2020-03-03 RX ADMIN — PROPOFOL 160 MG: 10 INJECTION, EMULSION INTRAVENOUS at 12:34

## 2020-03-03 RX ADMIN — SODIUM CHLORIDE, SODIUM LACTATE, POTASSIUM CHLORIDE, CALCIUM CHLORIDE: 600; 310; 30; 20 INJECTION, SOLUTION INTRAVENOUS at 12:48

## 2020-03-03 RX ADMIN — HYDROMORPHONE HYDROCHLORIDE 0.3 MG: 1 INJECTION, SOLUTION INTRAMUSCULAR; INTRAVENOUS; SUBCUTANEOUS at 14:59

## 2020-03-03 RX ADMIN — HYDROMORPHONE HYDROCHLORIDE 0.5 MG: 1 INJECTION, SOLUTION INTRAMUSCULAR; INTRAVENOUS; SUBCUTANEOUS at 15:42

## 2020-03-03 RX ADMIN — SODIUM CHLORIDE, POTASSIUM CHLORIDE, SODIUM LACTATE AND CALCIUM CHLORIDE: 600; 310; 30; 20 INJECTION, SOLUTION INTRAVENOUS at 13:41

## 2020-03-03 RX ADMIN — HYDROMORPHONE HYDROCHLORIDE 0.5 MG: 1 INJECTION, SOLUTION INTRAMUSCULAR; INTRAVENOUS; SUBCUTANEOUS at 17:07

## 2020-03-03 RX ADMIN — LIDOCAINE HYDROCHLORIDE 0.5 ML: 10 INJECTION, SOLUTION EPIDURAL; INFILTRATION; INTRACAUDAL; PERINEURAL at 12:05

## 2020-03-03 RX ADMIN — DEXAMETHASONE SODIUM PHOSPHATE 4 MG: 4 INJECTION, SOLUTION INTRA-ARTICULAR; INTRALESIONAL; INTRAMUSCULAR; INTRAVENOUS; SOFT TISSUE at 12:57

## 2020-03-03 RX ADMIN — ONDANSETRON 4 MG: 2 INJECTION INTRAMUSCULAR; INTRAVENOUS at 14:55

## 2020-03-03 RX ADMIN — HYDROMORPHONE HYDROCHLORIDE 0.5 MG: 1 INJECTION, SOLUTION INTRAMUSCULAR; INTRAVENOUS; SUBCUTANEOUS at 23:06

## 2020-03-03 ASSESSMENT — LIFESTYLE VARIABLES: TOBACCO_USE: 0

## 2020-03-03 ASSESSMENT — MIFFLIN-ST. JEOR: SCORE: 1362.61

## 2020-03-03 ASSESSMENT — ENCOUNTER SYMPTOMS: SEIZURES: 0

## 2020-03-03 ASSESSMENT — ACTIVITIES OF DAILY LIVING (ADL): ADLS_ACUITY_SCORE: 11

## 2020-03-03 NOTE — ANESTHESIA POSTPROCEDURE EVALUATION
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    Anesthesia Type:  General, ETT    Note:  Anesthesia Post Evaluation    Patient location during evaluation: PACU  Patient participation: Able to fully participate in evaluation  Level of consciousness: awake  Pain management: adequate  Airway patency: patent  Cardiovascular status: acceptable  Respiratory status: acceptable  Hydration status: acceptable  PONV: none             Last vitals:  Vitals:    03/03/20 1615 03/03/20 1620 03/03/20 1625   BP: 124/82 111/69 111/69   Pulse:  79    Resp: 11 8 8   Temp: 36.7  C (98.1  F) 36.8  C (98.2  F) 36.8  C (98.2  F)   SpO2: 97% 96% 96%         Electronically Signed By: Home Mac MD  March 3, 2020  4:49 PM

## 2020-03-03 NOTE — ANESTHESIA PREPROCEDURE EVALUATION
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             ROS/MED HX    ENT/Pulmonary:      (-) tobacco use and asthma   Neurologic:      (-) seizures and CVA   Cardiovascular:        (-) hypertension and PVD   METS/Exercise Tolerance:  >4 METS   Hematologic:     (+) Anemia, -      Musculoskeletal:         GI/Hepatic:     (+) GERD hepatitis (chronic) type B,       Renal/Genitourinary:         Endo:      (-) Type II DM and thyroid disease   Psychiatric:         Infectious Disease:         Malignancy:   (+) Malignancy (gastric cancer) History of GI  GI CA status post Chemo,         Other:                          Physical Exam  Normal systems: dental    Airway   Mallampati: II  TM distance: >3 FB  Neck ROM: full    Dental     Cardiovascular   Rhythm and rate: regular      Pulmonary             No results found for: WBC, HGB, HCT, PLT, CRP, SED, NA, POTASSIUM, CHLORIDE, CO2, BUN, CR, GLC, AMRIT, PHOS, MAG, ALBUMIN, PROTTOTAL, ALT, AST, GGT, ALKPHOS, BILITOTAL, BILIDIRECT, LIPASE, AMYLASE, MARIANELA, PTT, INR, FIBR, TSH, T4, T3, HCG, HCGS, CKTOTAL, CKMB, TROPN    Preop Vitals  BP Readings from Last 3 Encounters:   No data found for BP    Pulse Readings from Last 3 Encounters:   No data found for Pulse      Resp Readings from Last 3 Encounters:   No data found for Resp    SpO2 Readings from Last 3 Encounters:   No data found for SpO2      Temp Readings from Last 1 Encounters:   No data found for Temp    Ht Readings from  Last 1 Encounters:   No data found for Ht      Wt Readings from Last 1 Encounters:   No data found for Wt    There is no height or weight on file to calculate BMI.       Anesthesia Plan      History & Physical Review  History and physical reviewed and following examination; no interval change.    ASA Status:  2 .    NPO Status:  > 8 hours    Plan for General and ETT with Propofol induction. Maintenance will be Balanced.    PONV prophylaxis:  Ondansetron (or other 5HT-3) and Dexamethasone or Solumedrol       Postoperative Care  Postoperative pain management:  IV analgesics.      Consents  Anesthetic plan, risks, benefits and alternatives discussed with:  Patient..                 Irvin Haile MD

## 2020-03-03 NOTE — BRIEF OP NOTE
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 Pravin De La Torre- Assisting  Anesthesia: General   Estimated blood loss: 25 cc  Drains: 10 flat PAUL drain-- right upper quadrant  Specimens:   ID Type Source Tests Collected by Time Destination   A : distal gastrectomy Tissue Stomach, Body SURGICAL PATHOLOGY EXAM Eh Timmons MD 3/3/2020  1:58 PM      Findings:   Small tumor in the distal stomach-- no suspicious lymph nodes. Await final path  Complications: None.  Implants: * No implants in log *

## 2020-03-03 NOTE — ANESTHESIA CARE TRANSFER NOTE
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    Anesthesia Type:   General, ETT     Note:  Airway :Face Mask  Patient transferred to:PACU  Comments: Anesthesia Care Note    Patient: Beh Meh    Transferred to: PACU    Patient vital signs: Stable    Airway: FM    Monitors placed. VSS. PIV patent. No change in dentition. Report given to RN.      Jannette Aleman CRNA   3/3/2020  Handoff Report: Identifed the Patient, Identified the Reponsible Provider, Reviewed the pertinent medical history, Discussed the surgical course, Reviewed Intra-OP anesthesia mangement and issues during anesthesia, Set expectations for post-procedure period and Allowed opportunity for questions and acknowledgement of understanding      Vitals: (Last set prior to Anesthesia Care Transfer)    CRNA VITALS  3/3/2020 1447 - 3/3/2020 1525      3/3/2020             Pulse:  85    SpO2:  98 %    Resp Rate (observed):  15    Resp Rate (set):  10                Electronically Signed By: ELOISA Sotelo CRNA  March 3, 2020  3:25 PM

## 2020-03-04 LAB — GLUCOSE BLDC GLUCOMTR-MCNC: 99 MG/DL (ref 70–99)

## 2020-03-04 PROCEDURE — C9113 INJ PANTOPRAZOLE SODIUM, VIA: HCPCS | Performed by: PHYSICIAN ASSISTANT

## 2020-03-04 PROCEDURE — 25000128 H RX IP 250 OP 636: Performed by: PHYSICIAN ASSISTANT

## 2020-03-04 PROCEDURE — 25800030 ZZH RX IP 258 OP 636: Performed by: PHYSICIAN ASSISTANT

## 2020-03-04 PROCEDURE — 00000146 ZZHCL STATISTIC GLUCOSE BY METER IP

## 2020-03-04 PROCEDURE — 12000000 ZZH R&B MED SURG/OB

## 2020-03-04 RX ORDER — LIDOCAINE/PRILOCAINE 2.5 %-2.5%
CREAM (GRAM) TOPICAL DAILY PRN
COMMUNITY
End: 2021-11-15

## 2020-03-04 RX ORDER — MAGNESIUM HYDROXIDE/ALUMINUM HYDROXICE/SIMETHICONE 120; 1200; 1200 MG/30ML; MG/30ML; MG/30ML
15 SUSPENSION ORAL
COMMUNITY
End: 2021-11-15

## 2020-03-04 RX ORDER — POLYETHYLENE GLYCOL 3350 17 G/17G
17 POWDER, FOR SOLUTION ORAL DAILY PRN
COMMUNITY
End: 2021-10-29

## 2020-03-04 RX ORDER — LORAZEPAM 0.5 MG/1
0.5 TABLET ORAL EVERY 4 HOURS PRN
COMMUNITY
End: 2021-12-03

## 2020-03-04 RX ADMIN — HYDROMORPHONE HYDROCHLORIDE 0.5 MG: 1 INJECTION, SOLUTION INTRAMUSCULAR; INTRAVENOUS; SUBCUTANEOUS at 04:39

## 2020-03-04 RX ADMIN — PANTOPRAZOLE SODIUM 40 MG: 40 INJECTION, POWDER, FOR SOLUTION INTRAVENOUS at 09:08

## 2020-03-04 RX ADMIN — HYDROMORPHONE HYDROCHLORIDE 0.5 MG: 1 INJECTION, SOLUTION INTRAMUSCULAR; INTRAVENOUS; SUBCUTANEOUS at 02:32

## 2020-03-04 RX ADMIN — SODIUM CHLORIDE: 9 INJECTION, SOLUTION INTRAVENOUS at 14:02

## 2020-03-04 RX ADMIN — HYDROMORPHONE HYDROCHLORIDE 0.5 MG: 1 INJECTION, SOLUTION INTRAMUSCULAR; INTRAVENOUS; SUBCUTANEOUS at 07:29

## 2020-03-04 RX ADMIN — ENOXAPARIN SODIUM 40 MG: 40 INJECTION SUBCUTANEOUS at 09:09

## 2020-03-04 RX ADMIN — SODIUM CHLORIDE: 9 INJECTION, SOLUTION INTRAVENOUS at 04:15

## 2020-03-04 RX ADMIN — HYDROMORPHONE HYDROCHLORIDE 0.5 MG: 1 INJECTION, SOLUTION INTRAMUSCULAR; INTRAVENOUS; SUBCUTANEOUS at 14:01

## 2020-03-04 RX ADMIN — SODIUM CHLORIDE: 9 INJECTION, SOLUTION INTRAVENOUS at 23:55

## 2020-03-04 RX ADMIN — HYDROMORPHONE HYDROCHLORIDE 0.5 MG: 1 INJECTION, SOLUTION INTRAMUSCULAR; INTRAVENOUS; SUBCUTANEOUS at 20:30

## 2020-03-04 RX ADMIN — HYDROMORPHONE HYDROCHLORIDE 0.5 MG: 1 INJECTION, SOLUTION INTRAMUSCULAR; INTRAVENOUS; SUBCUTANEOUS at 09:09

## 2020-03-04 RX ADMIN — HYDROMORPHONE HYDROCHLORIDE 0.5 MG: 1 INJECTION, SOLUTION INTRAMUSCULAR; INTRAVENOUS; SUBCUTANEOUS at 16:49

## 2020-03-04 ASSESSMENT — ACTIVITIES OF DAILY LIVING (ADL)
ADLS_ACUITY_SCORE: 12

## 2020-03-04 ASSESSMENT — MIFFLIN-ST. JEOR: SCORE: 1384

## 2020-03-04 NOTE — PHARMACY-ADMISSION MEDICATION HISTORY
Pharmacy Medication History  Admission medication history interview status for the 3/3/2020  admission is complete. See EPIC admission navigator for prior to admission medications     Medication history sources: Patient and Patient's family/friend (broght in all of patient's bottles from home. )  Medication history source reliability: Good  Adherence assessment: Good    Significant changes made to the medication list:  1) Patient states that she takes omeprazole 20mg twice daily and is prescribed to take twice daily. (no longer once daily)      Medication reconciliation completed by provider prior to medication history? No    Time spent in this activity: 20 minutes      Prior to Admission medications    Medication Sig Last Dose Taking? Auth Provider   alum & mag hydroxide-simethicone (MAALOX) 200-200-20 MG/5ML SUSP suspension Take 15 mLs by mouth 3 times daily (before meals) Past Week at Unknown time Yes Unknown, Entered By History   dexamethasone (DECADRON) 4 MG tablet Take 8 mg by mouth See Admin Instructions Take 2 tablets twice daily on the day before, day of, and day after chemo.  Yes Reported, Patient   lidocaine-prilocaine (EMLA) 2.5-2.5 % external cream Apply topically daily as needed for moderate pain (For port) prn med Yes Unknown, Entered By History   LORazepam (ATIVAN) 0.5 MG tablet Take 0.5 mg by mouth every 4 hours as needed for anxiety prn med Yes Unknown, Entered By History   omeprazole 20 MG tablet Take 20 mg by mouth 2 times daily  3/2/2020 at Unknown time Yes Reported, Patient   polyethylene glycol (MIRALAX) powder Take 17 g by mouth daily as needed for constipation prn med Yes Unknown, Entered By History   prochlorperazine (COMPAZINE) 10 MG tablet Take 10 mg by mouth every 6 hours as needed for nausea or vomiting prn med Yes Reported, Patient

## 2020-03-04 NOTE — PROGRESS NOTES
THORACIC SURGERY POD # 1    Stable  AVSS on RA  NGT bilious  Abdomen soft  PAUL no bile    NPO  NGT to LIS  IVF  Path pending      PACO KHOURY MD [facility] ONCOLOGY THORACIC SURGERY  CELL:  [phone]  OFFICE: [phone]

## 2020-03-04 NOTE — OP NOTE
Procedure Date: 03/03/2020      SURGEON:  Eh Timmons MD.      FIRST ASSISTANT:  Pravin De La Torre MD.      SECOND ASSISTANT:  Ila Cabrera PA-C.      PREOPERATIVE DIAGNOSIS:  Adenocarcinoma of the body of the stomach.      POSTOPERATIVE DIAGNOSIS:  Adenocarcinoma of the body of the stomach.      PROCEDURES:  Abdominal exploration with distal partial gastrectomy and Billroth II gastrojejunostomy reconstruction.  Antecolic and antiperistaltic.      ANESTHESIA:  General.      INDICATIONS:  This 40-year-old woman who was diagnosed with adenocarcinoma of the body of the stomach on the lesser curvature.  She underwent chemotherapy with some progression of the tumor locally.  The most recent endoscopy with EUS did not show any evidence of adenopathy and the tumor was classified as T3 N0 M0.  Based on the findings, a resection is indicated for treatment.      DESCRIPTION OF PROCEDURE:  The patient was brought to the OR and placed in supine position.  She underwent general anesthesia with endotracheal intubation.  The patient's chest and abdomen was prepared in sterile fashion with ChloraPrep.  An upper midline incision was made.  The peritoneal cavity was entered.  The tumor was easily localized and measured approximately 4 cm.  There was no invasion to the wall.  There was no evidence of adenopathy.  Careful exploration of the abdomen was otherwise unremarkable.  Based on the findings, the best option of surgical resection is a distal partial gastrectomy.  The distal part of the stomach was mobilized.  The lesser sac was entered.  The right gastroepiploic artery and branch of the right gastric artery were dissected circumferentially, divided with LigaSure of 2-0 silk.  Then, the duodenum just distal to the pylorus was divided with an application of [place] 60 gold stapling device.  The staple line was reinforced on the duodenal side with interrupted 2-0 silk suture.  Then the stomach was dissected proximally.   The stomach was divided proximal to the tumor just below the level of the left gastric artery with multiple applications of [place] 60 black stapling device.  The specimen was submitted for permanent section.  The most medial portion of the staple line was reinforced with interrupted 3-0 silk suture.  Then a loop of small bowel approximately 20 cm distal to the ligament of Treitz was selected and a handsewn gastrojejunostomy was made based on the greater curvature of the stomach.  This was done with running 3-0 Vicryl on the mucosa and multiple interrupted 3-0 silk on the seromuscular.  The lumen was excellent.  The blood supply to both ends of the anastomosis was excellent.  There was no tension.  Nasogastric tube was advanced and placed at the afferent limb of the jejunum.  The gastrojejunostomy is antecolic and antiperistaltic.  Hemostasis was verified and was excellent.  Through a separate stab wound, a 10 flat Eric-Stanton drain was placed around the area of the duodenal stump.  Hemostasis was verified and was excellent.  Pleural cavity was irrigated with normal saline, which was aspirated.  Incision was closed with running looped 0 PDS on the fascia.  Subcutaneous tissue was irrigated and the skin closed with a running 2-0 Vicryl in the subcutaneous tissues, skin closed with Insorb staples.      ESTIMATED BLOOD LOSS:  100 mL.      COUNTS:  Needle and sponge count is correct.        The assistance of Dr. De La Torre and Ila Cabrera PA-C, was necessary in accomplishing the steps of the procedure as described above, providing exposure and retraction.         PACO KHOURY MD             D: 2020   T: 2020   MT: RUDDY      Name:     MEH, BEH   MRN:      [id number]        Account:        [id number]   :      1980           Procedure Date: 2020      Document: [id number]

## 2020-03-04 NOTE — PLAN OF CARE
Patient arrived from PACU at 1800. VSS ex tachycardic on 2 LPM. NG at 52 cm in right nares to LIS. 2 abdominal incisions, midline w/marked drainage. PAUL to bulb suction. Dilaudid for pain. Tele NSR. Medina patent. Strict NPO.

## 2020-03-04 NOTE — PLAN OF CARE
Pt A&O. VSS on RA; tachycardic at times. Tele: NSR. NG to low intermittent suction at landmark of 52 cm in right nares. Midline abdominal incision dressings marked, no change. PAUL to bulb suction. Lung sounds clear. Bowel sounds inaudible. Medina patent; good urine output. Strict NPO. Pain managed with PRN dilaudid.

## 2020-03-05 ENCOUNTER — APPOINTMENT (OUTPATIENT)
Dept: GENERAL RADIOLOGY | Facility: CLINIC | Age: 40
End: 2020-03-05
Attending: THORACIC SURGERY (CARDIOTHORACIC VASCULAR SURGERY)
Payer: COMMERCIAL

## 2020-03-05 LAB
GLUCOSE BLDC GLUCOMTR-MCNC: 80 MG/DL (ref 70–99)
LACTATE BLD-SCNC: 0.6 MMOL/L (ref 0.7–2)

## 2020-03-05 PROCEDURE — 12000000 ZZH R&B MED SURG/OB

## 2020-03-05 PROCEDURE — 71045 X-RAY EXAM CHEST 1 VIEW: CPT

## 2020-03-05 PROCEDURE — 25000128 H RX IP 250 OP 636: Performed by: PHYSICIAN ASSISTANT

## 2020-03-05 PROCEDURE — 83605 ASSAY OF LACTIC ACID: CPT | Performed by: THORACIC SURGERY (CARDIOTHORACIC VASCULAR SURGERY)

## 2020-03-05 PROCEDURE — 36415 COLL VENOUS BLD VENIPUNCTURE: CPT | Performed by: THORACIC SURGERY (CARDIOTHORACIC VASCULAR SURGERY)

## 2020-03-05 PROCEDURE — 25800030 ZZH RX IP 258 OP 636: Performed by: PHYSICIAN ASSISTANT

## 2020-03-05 PROCEDURE — 00000146 ZZHCL STATISTIC GLUCOSE BY METER IP

## 2020-03-05 PROCEDURE — C9113 INJ PANTOPRAZOLE SODIUM, VIA: HCPCS | Performed by: PHYSICIAN ASSISTANT

## 2020-03-05 RX ADMIN — HYDROMORPHONE HYDROCHLORIDE 0.5 MG: 1 INJECTION, SOLUTION INTRAMUSCULAR; INTRAVENOUS; SUBCUTANEOUS at 00:41

## 2020-03-05 RX ADMIN — HYDROMORPHONE HYDROCHLORIDE 0.5 MG: 1 INJECTION, SOLUTION INTRAMUSCULAR; INTRAVENOUS; SUBCUTANEOUS at 20:04

## 2020-03-05 RX ADMIN — HYDROMORPHONE HYDROCHLORIDE 0.5 MG: 1 INJECTION, SOLUTION INTRAMUSCULAR; INTRAVENOUS; SUBCUTANEOUS at 08:33

## 2020-03-05 RX ADMIN — HYDROMORPHONE HYDROCHLORIDE 0.5 MG: 1 INJECTION, SOLUTION INTRAMUSCULAR; INTRAVENOUS; SUBCUTANEOUS at 14:00

## 2020-03-05 RX ADMIN — SODIUM CHLORIDE: 9 INJECTION, SOLUTION INTRAVENOUS at 09:14

## 2020-03-05 RX ADMIN — ENOXAPARIN SODIUM 40 MG: 40 INJECTION SUBCUTANEOUS at 08:58

## 2020-03-05 RX ADMIN — PANTOPRAZOLE SODIUM 40 MG: 40 INJECTION, POWDER, FOR SOLUTION INTRAVENOUS at 08:33

## 2020-03-05 RX ADMIN — SODIUM CHLORIDE: 9 INJECTION, SOLUTION INTRAVENOUS at 18:52

## 2020-03-05 ASSESSMENT — ACTIVITIES OF DAILY LIVING (ADL)
ADLS_ACUITY_SCORE: 12
ADLS_ACUITY_SCORE: 13
ADLS_ACUITY_SCORE: 13

## 2020-03-05 NOTE — PLAN OF CARE
Dx:Esophagoastric resection distal gastrectomy to gastrojejunostomy Hx:Gastric cx Vs:stable on room air. A/O:x4. Incisions:Midline incision MARVIN. CWMS:WDL. Pain level/controlled with:PRN dilaudid. Up with:SBA. NPO diet. No N/V. Pot passing gas. Voiding adequately. Is:using at bedside. Up walking. PAUL drain in place. Gi:hypoactive bowel sounds. Resp:clear lung sounds. NG in place to LIS. Plan: Will continue monitor.

## 2020-03-05 NOTE — PROGRESS NOTES
"Thoracic Surgery POD #2:  /71 (BP Location: Left arm)   Pulse 92   Temp 98.3  F (36.8  C) (Oral)   Resp 18   Ht 1.626 m (5' 4\")   Wt 72.9 kg (160 lb 11.5 oz)   SpO2 95%   BMI 27.59 kg/m     Note temp up to 101.5 at midnight-- 100.1 at 0133. Now afebrile since.    Final path: pending  UOP: good, independently voiding  NGT: 600 ml over 24 hours, bilious  PAUL drain: to thumbprint suction- serous, no bile, no bleeding    S: Just got up to bathroom. Has been walking. Pain around abdominal incision. Family member says \"she's fine\".  O: Inc.: dry, stersit intact, some tenderness to palpation, soft  P: NPO  NGT to PIS  Anticipate UGI early next week  Pulm toilet    Ila Cabrera PA-C with Dr. Eh Timmons  [facility]  Cell [phone]      "

## 2020-03-05 NOTE — PLAN OF CARE
Dx: Gastric adenocarcinoma. Surg: Esophagogastric ressection, distal gastrectomy dilroth to gastrojejunostomy. VSS ex tachycardic at times and a temp of 99.9. She was complaining of shoulder pain post ambulation. Alerted Dr. Timmons about shoulder pain and temp. He instructed to ambulate her more and to encourage pulmonary hygiene. A/Ox4. Abdominal incisions approximated, gauze removed per Dr. Timmons. CMS intact. PAUL drain to bulb suction output of 15mL. Pain controlled with prn dilaudid, given . Up with A1, ambulated halls x3 from 7a-7p. Instructed on-coming nurse to try to ambulate 1-2 more times before bed. Tolerating NPO diet. NG tube in place, 52cm, output 450mL. Denies N&V. -gas, -bm. Emdina out at 630am, voiding adequately. LS clear, diminished in bilateral lower lobes, encouraged pulmonary hygiene. IS to 750.

## 2020-03-05 NOTE — PLAN OF CARE
VSS on RA ex a fever of 101.5 (highest), now 98.7 and tachycardic. Midline incision with steri-strips, MARVIN. PAUL to bulb suction, dressing CDI. NG to LIS at landmark 52cm. Bowel sounds inaudible, no flatus noted. Denies nausea. Pain managed with dilaudid. Pain mainly in abdomen and right shoulder. Pt needs encouragement for ambulation and pulmonary toilet. IS to 750 and acapella done. Up with assist of 1-2.

## 2020-03-06 LAB
ANION GAP SERPL CALCULATED.3IONS-SCNC: 6 MMOL/L (ref 3–14)
BUN SERPL-MCNC: 9 MG/DL (ref 7–30)
CALCIUM SERPL-MCNC: 8.5 MG/DL (ref 8.5–10.1)
CHLORIDE SERPL-SCNC: 111 MMOL/L (ref 94–109)
CO2 SERPL-SCNC: 23 MMOL/L (ref 20–32)
COPATH REPORT: NORMAL
CREAT SERPL-MCNC: 0.49 MG/DL (ref 0.52–1.04)
GFR SERPL CREATININE-BSD FRML MDRD: >90 ML/MIN/{1.73_M2}
GLUCOSE SERPL-MCNC: 63 MG/DL (ref 70–99)
HGB BLD-MCNC: 9.4 G/DL (ref 11.7–15.7)
PLATELET # BLD AUTO: 145 10E9/L (ref 150–450)
POTASSIUM SERPL-SCNC: 3.6 MMOL/L (ref 3.4–5.3)
SODIUM SERPL-SCNC: 140 MMOL/L (ref 133–144)

## 2020-03-06 PROCEDURE — 25000132 ZZH RX MED GY IP 250 OP 250 PS 637: Performed by: THORACIC SURGERY (CARDIOTHORACIC VASCULAR SURGERY)

## 2020-03-06 PROCEDURE — 80048 BASIC METABOLIC PNL TOTAL CA: CPT | Performed by: PHYSICIAN ASSISTANT

## 2020-03-06 PROCEDURE — 25000128 H RX IP 250 OP 636: Performed by: PHYSICIAN ASSISTANT

## 2020-03-06 PROCEDURE — 85049 AUTOMATED PLATELET COUNT: CPT | Performed by: PHYSICIAN ASSISTANT

## 2020-03-06 PROCEDURE — 12000000 ZZH R&B MED SURG/OB

## 2020-03-06 PROCEDURE — 36415 COLL VENOUS BLD VENIPUNCTURE: CPT | Performed by: PHYSICIAN ASSISTANT

## 2020-03-06 PROCEDURE — 25800030 ZZH RX IP 258 OP 636: Performed by: PHYSICIAN ASSISTANT

## 2020-03-06 PROCEDURE — C9113 INJ PANTOPRAZOLE SODIUM, VIA: HCPCS | Performed by: PHYSICIAN ASSISTANT

## 2020-03-06 PROCEDURE — 85018 HEMOGLOBIN: CPT | Performed by: PHYSICIAN ASSISTANT

## 2020-03-06 RX ADMIN — ENOXAPARIN SODIUM 40 MG: 40 INJECTION SUBCUTANEOUS at 09:11

## 2020-03-06 RX ADMIN — SODIUM CHLORIDE: 9 INJECTION, SOLUTION INTRAVENOUS at 14:32

## 2020-03-06 RX ADMIN — PANTOPRAZOLE SODIUM 40 MG: 40 INJECTION, POWDER, FOR SOLUTION INTRAVENOUS at 09:11

## 2020-03-06 RX ADMIN — HYDROMORPHONE HYDROCHLORIDE 0.5 MG: 1 INJECTION, SOLUTION INTRAMUSCULAR; INTRAVENOUS; SUBCUTANEOUS at 15:48

## 2020-03-06 RX ADMIN — HYDROMORPHONE HYDROCHLORIDE 0.5 MG: 1 INJECTION, SOLUTION INTRAMUSCULAR; INTRAVENOUS; SUBCUTANEOUS at 07:41

## 2020-03-06 RX ADMIN — SODIUM CHLORIDE: 9 INJECTION, SOLUTION INTRAVENOUS at 04:43

## 2020-03-06 RX ADMIN — Medication 1 ML: at 16:10

## 2020-03-06 ASSESSMENT — ACTIVITIES OF DAILY LIVING (ADL)
ADLS_ACUITY_SCORE: 12

## 2020-03-06 ASSESSMENT — MIFFLIN-ST. JEOR: SCORE: 1351

## 2020-03-06 NOTE — PLAN OF CARE
VSS on RA. Midline incision with steri-strips, CDI/MARVIN. PAUL drain x1 to bulb suction; dressing CDI. NG at landmark to LIS with brown colored output. Lung sounds clear. Bowel sounds hypoactive.  No flatus noted/reported. Pain managed with PRN IV Dilaudid. Up SBA. Ambulated in hallway.  Strict NPO. No c/o nausea/vomiting.

## 2020-03-06 NOTE — PROGRESS NOTES
"Thoracic Surgery POD #3:  /76 (BP Location: Left arm)   Pulse 88   Temp 98.2  F (36.8  C) (Oral)   Resp 18   Ht 1.626 m (5' 4\")   Wt 69.6 kg (153 lb 7 oz)   SpO2 95%   BMI 26.34 kg/m    K+: 3.6  Na+: 140  Creat: 0.49  Hgb: 9.4<---10.6  NGT: bilious, 380 ml over 24 hours  PAUL drain: serous, 60 ml over 24 hours  S: Patient resting quietly in bed- NGT to LIS, says she is walking. Per RN, c/o sore throat.   O: Abd: soft, inc dry and without erythema  P: Walk, Pulm toilet  Chloraseptic spray OK for comfort  Strict NPO  NGT to LIS  Anticipate UGI early next week to eval for leak, obstruction    Ila Cabrera PA-C with Dr. Eh [facility]  Cell [phone]      "

## 2020-03-06 NOTE — PLAN OF CARE
Pt A&O. VSS on RA. Midline incision with steri-strips, CDI/MARVIN. PAUL x1 to bulb suction; dressing CDI. NG at landmark to LIS. Lung sounds clear. Bowel sounds hypo, no flatus noted. Pain managed with PRN dilaudid. Up SBA. Strict NPO. Denies nausea.

## 2020-03-07 PROCEDURE — 25000128 H RX IP 250 OP 636: Performed by: PHYSICIAN ASSISTANT

## 2020-03-07 PROCEDURE — 25800030 ZZH RX IP 258 OP 636: Performed by: PHYSICIAN ASSISTANT

## 2020-03-07 PROCEDURE — C9113 INJ PANTOPRAZOLE SODIUM, VIA: HCPCS | Performed by: PHYSICIAN ASSISTANT

## 2020-03-07 PROCEDURE — 12000000 ZZH R&B MED SURG/OB

## 2020-03-07 RX ADMIN — SODIUM CHLORIDE: 9 INJECTION, SOLUTION INTRAVENOUS at 01:57

## 2020-03-07 RX ADMIN — HYDROMORPHONE HYDROCHLORIDE 0.5 MG: 1 INJECTION, SOLUTION INTRAMUSCULAR; INTRAVENOUS; SUBCUTANEOUS at 16:56

## 2020-03-07 RX ADMIN — HYDROMORPHONE HYDROCHLORIDE 0.5 MG: 1 INJECTION, SOLUTION INTRAMUSCULAR; INTRAVENOUS; SUBCUTANEOUS at 19:58

## 2020-03-07 RX ADMIN — ENOXAPARIN SODIUM 40 MG: 40 INJECTION SUBCUTANEOUS at 09:48

## 2020-03-07 RX ADMIN — SODIUM CHLORIDE: 9 INJECTION, SOLUTION INTRAVENOUS at 11:56

## 2020-03-07 RX ADMIN — PANTOPRAZOLE SODIUM 40 MG: 40 INJECTION, POWDER, FOR SOLUTION INTRAVENOUS at 09:48

## 2020-03-07 RX ADMIN — HYDROMORPHONE HYDROCHLORIDE 0.5 MG: 1 INJECTION, SOLUTION INTRAMUSCULAR; INTRAVENOUS; SUBCUTANEOUS at 10:41

## 2020-03-07 RX ADMIN — SODIUM CHLORIDE: 9 INJECTION, SOLUTION INTRAVENOUS at 21:36

## 2020-03-07 RX ADMIN — HYDROMORPHONE HYDROCHLORIDE 0.5 MG: 1 INJECTION, SOLUTION INTRAMUSCULAR; INTRAVENOUS; SUBCUTANEOUS at 01:57

## 2020-03-07 ASSESSMENT — ACTIVITIES OF DAILY LIVING (ADL)
ADLS_ACUITY_SCORE: 12

## 2020-03-07 NOTE — PLAN OF CARE
POD #4. VSS. A&O x4. IV Dilaudid x1 for abdominal pain. NG patent. Dressing CDI. Ambulate x4 this shift. NPO. Hypoactive BS. Reports of passing flatus. PAUL patent, minimal output. Voiding. IVF infusing. Up with SBA. Will continue to montior.

## 2020-03-07 NOTE — PROGRESS NOTES
THORACIC SURGERY POD # 4    AVSS on RA    Abdomen soft  Wound OK  NGT bilious  PAUL minimal output  Serosanguineous      Satisfactory    Same Rx for now    UGI on Monday    PACO KHOURY MD [facility] ONCOLOGY THORACIC SURGERY  CELL:  [phone]  OFFICE: [phone]

## 2020-03-08 PROCEDURE — 12000000 ZZH R&B MED SURG/OB

## 2020-03-08 PROCEDURE — 25800030 ZZH RX IP 258 OP 636: Performed by: PHYSICIAN ASSISTANT

## 2020-03-08 PROCEDURE — 25000128 H RX IP 250 OP 636: Performed by: PHYSICIAN ASSISTANT

## 2020-03-08 PROCEDURE — C9113 INJ PANTOPRAZOLE SODIUM, VIA: HCPCS | Performed by: PHYSICIAN ASSISTANT

## 2020-03-08 RX ADMIN — PANTOPRAZOLE SODIUM 40 MG: 40 INJECTION, POWDER, FOR SOLUTION INTRAVENOUS at 08:06

## 2020-03-08 RX ADMIN — HYDROMORPHONE HYDROCHLORIDE 0.5 MG: 1 INJECTION, SOLUTION INTRAMUSCULAR; INTRAVENOUS; SUBCUTANEOUS at 04:30

## 2020-03-08 RX ADMIN — HYDROMORPHONE HYDROCHLORIDE 0.5 MG: 1 INJECTION, SOLUTION INTRAMUSCULAR; INTRAVENOUS; SUBCUTANEOUS at 12:40

## 2020-03-08 RX ADMIN — SODIUM CHLORIDE: 9 INJECTION, SOLUTION INTRAVENOUS at 08:08

## 2020-03-08 RX ADMIN — HYDROMORPHONE HYDROCHLORIDE 0.5 MG: 1 INJECTION, SOLUTION INTRAMUSCULAR; INTRAVENOUS; SUBCUTANEOUS at 20:34

## 2020-03-08 RX ADMIN — ENOXAPARIN SODIUM 40 MG: 40 INJECTION SUBCUTANEOUS at 10:05

## 2020-03-08 RX ADMIN — SODIUM CHLORIDE: 9 INJECTION, SOLUTION INTRAVENOUS at 17:26

## 2020-03-08 ASSESSMENT — ACTIVITIES OF DAILY LIVING (ADL)
ADLS_ACUITY_SCORE: 12

## 2020-03-08 NOTE — PLAN OF CARE
NG still with moderate amounts of brown colored output. Scant PAUL output. Pain well controlled with occ IVP Dilaudid 0.5 mg. VSS. Family visited last evening

## 2020-03-08 NOTE — PLAN OF CARE
Pt is able to speak minimal English and understands writer explaining details in lay terms. C/o incisional pain, dilaudid given and effective. Denied pain later in shift. Up SBA. Ambulated in min x1. LS clear. NG to intermittent suction, 200ccs output, brown/green. PAUL dressing cdi, minimal drainage. Strict NPO. IVF maintained. Voiding without difficulty. Plan for upper endoscopy tomorrow, not yet scheduled.    Problem: Bowel Elimination Impaired (Surgery Nonspecified)  Goal: Effective Bowel Elimination  3/8/2020 1839 by Cordelia Newman RN  Outcome: No Change     Problem: Bleeding (Surgery Nonspecified)  Goal: Absence of Bleeding  3/8/2020 1839 by Cordelia Newman RN  Outcome: Improving     Problem: Infection (Surgery Nonspecified)  Goal: Absence of Infection Signs/Symptoms  3/8/2020 1839 by Cordelia Newman RN  Outcome: Improving     Problem: Ongoing Anesthesia Effects (Surgery Nonspecified)  Goal: Anesthesia/Sedation Recovery  3/8/2020 1839 by Cordelia Newman RN  Outcome: Improving     Problem: Pain (Surgery Nonspecified)  Goal: Acceptable Pain Control  3/8/2020 1839 by Cordelia Newman RN  Outcome: Improving     Problem: Postoperative Nausea and Vomiting (Surgery Nonspecified)  Goal: Nausea and Vomiting Relief  3/8/2020 1839 by Cordelia Newman RN  Outcome: Improving

## 2020-03-08 NOTE — PROGRESS NOTES
THORACIC SURGERY POD # 5    Doing well  AVSS on RA   NGT bilious  PAUL  Minimal output  Sanguineous    Abdomen soft wound OK    UGI tomorrow    PACO KHOURY MD [facility] ONCOLOGY THORACIC SURGERY  CELL:  [phone]  OFFICE: [phone]

## 2020-03-08 NOTE — PLAN OF CARE
A&O, VSS on room air. Lung sounds diminished/ clear.  Bowel sounds hypoactive, +flatus. Adequate urine output. Midline incision enclosed with steri strips. PAUL to bulb suction w/ very little to no output. NG to LIS thin brown output. Ambulates standby assist. Strict NPO. Demes pain interventions. NS running @ 100.

## 2020-03-09 ENCOUNTER — APPOINTMENT (OUTPATIENT)
Dept: GENERAL RADIOLOGY | Facility: CLINIC | Age: 40
End: 2020-03-09
Attending: THORACIC SURGERY (CARDIOTHORACIC VASCULAR SURGERY)
Payer: COMMERCIAL

## 2020-03-09 LAB
COPATH REPORT: NORMAL
CREAT SERPL-MCNC: 0.5 MG/DL (ref 0.52–1.04)
GFR SERPL CREATININE-BSD FRML MDRD: >90 ML/MIN/{1.73_M2}
PLATELET # BLD AUTO: 210 10E9/L (ref 150–450)

## 2020-03-09 PROCEDURE — 12000000 ZZH R&B MED SURG/OB

## 2020-03-09 PROCEDURE — 82565 ASSAY OF CREATININE: CPT | Performed by: PHYSICIAN ASSISTANT

## 2020-03-09 PROCEDURE — 36415 COLL VENOUS BLD VENIPUNCTURE: CPT | Performed by: PHYSICIAN ASSISTANT

## 2020-03-09 PROCEDURE — 40000986 XR UPPER GI WATER SOLUBLE

## 2020-03-09 PROCEDURE — 25000128 H RX IP 250 OP 636: Performed by: PHYSICIAN ASSISTANT

## 2020-03-09 PROCEDURE — 85049 AUTOMATED PLATELET COUNT: CPT | Performed by: PHYSICIAN ASSISTANT

## 2020-03-09 PROCEDURE — 25000132 ZZH RX MED GY IP 250 OP 250 PS 637: Performed by: PHYSICIAN ASSISTANT

## 2020-03-09 PROCEDURE — 25800030 ZZH RX IP 258 OP 636: Performed by: PHYSICIAN ASSISTANT

## 2020-03-09 PROCEDURE — C9113 INJ PANTOPRAZOLE SODIUM, VIA: HCPCS | Performed by: PHYSICIAN ASSISTANT

## 2020-03-09 RX ORDER — HYDROMORPHONE HYDROCHLORIDE 1 MG/ML
1-2 SOLUTION ORAL EVERY 4 HOURS PRN
Status: DISCONTINUED | OUTPATIENT
Start: 2020-03-09 | End: 2020-03-11 | Stop reason: HOSPADM

## 2020-03-09 RX ADMIN — ACETAMINOPHEN 650 MG: 325 SUSPENSION ORAL at 18:48

## 2020-03-09 RX ADMIN — PANTOPRAZOLE SODIUM 40 MG: 40 INJECTION, POWDER, FOR SOLUTION INTRAVENOUS at 12:54

## 2020-03-09 RX ADMIN — HYDROMORPHONE HYDROCHLORIDE 0.5 MG: 1 INJECTION, SOLUTION INTRAMUSCULAR; INTRAVENOUS; SUBCUTANEOUS at 04:12

## 2020-03-09 RX ADMIN — ACETAMINOPHEN 650 MG: 325 SUSPENSION ORAL at 23:00

## 2020-03-09 RX ADMIN — SODIUM CHLORIDE: 9 INJECTION, SOLUTION INTRAVENOUS at 02:46

## 2020-03-09 RX ADMIN — ENOXAPARIN SODIUM 40 MG: 40 INJECTION SUBCUTANEOUS at 12:55

## 2020-03-09 RX ADMIN — HYDROMORPHONE HYDROCHLORIDE 0.5 MG: 1 INJECTION, SOLUTION INTRAMUSCULAR; INTRAVENOUS; SUBCUTANEOUS at 12:55

## 2020-03-09 RX ADMIN — SODIUM CHLORIDE: 9 INJECTION, SOLUTION INTRAVENOUS at 23:04

## 2020-03-09 ASSESSMENT — ACTIVITIES OF DAILY LIVING (ADL)
ADLS_ACUITY_SCORE: 12

## 2020-03-09 NOTE — PLAN OF CARE
Tamanna speaking. VSS on RA. BS hypoactive,-flatus, -bm.  LS clear. Midline abdominal incision WDL, PAUL dressing CDI. CMS intact. Pain controlled with PRN dilaudid. Up with SBA. NPO. NG to LIS. Voiding adequately.

## 2020-03-09 NOTE — PROGRESS NOTES
SPIRITUAL HEALTH SERVICES Progress Note  [facility] -01    An  became available for the pt.  She would like a  to visit, but she was concerned she couldn't participate in communion because she can't eat anything right now.  I told her the  could offer her a sacrament of the sick or just a visit.  She was thankful the referral will be made.        Laura Rodriguez   Intern

## 2020-03-09 NOTE — PLAN OF CARE
A&O x4. VSS on RA. CMS intact. Lungs clear. BS+, BM-, flatus+. Incisions WDL. Strict NPO. Denies N/V. Voiding adequately. Dilaudid for pain. Up SBA. Plan for upper GI today. PAUL to bulb suction. Minimal to no output.

## 2020-03-09 NOTE — PROGRESS NOTES
SPIRITUAL HEALTH SERVICES Progress Note  [facility] -01    Attempted to visit pt with a scheduled ; however, nursing staff informed me the  did not show.  They are unsure when there will be one available.  Spiritual health should try back another time.        Laura Rodriguez   Intern

## 2020-03-09 NOTE — PROGRESS NOTES
"Thoracic Surgery POD #6:  /75 (BP Location: Left arm)   Pulse 84   Temp 98.3  F (36.8  C) (Oral)   Resp 16   Ht 1.626 m (5' 4\")   Wt 69.6 kg (153 lb 7 oz)   SpO2 97%   BMI 26.34 kg/m    NGT: 900 ml over 24 hours  PAUL drain: minimal output    S: Per RN, no  available either in person nor by telephone so could not undergo UGI as planned this morning. Plan is for 1400 UGI with . Per RN, patient has been up in chair, walking and passing gas. Refused pain meds this morning.  O: Abdomen: soft, nontender  PAUL: output serous  P: UGI at 1400 today, r/o leak and obstruction     Ila Cabrera PA-C with Dr. Eh Timmons  [facility]  Cell [phone]      "

## 2020-03-09 NOTE — PROGRESS NOTES
"Thoracic Surgery POD #6:  /80 (BP Location: Left arm)   Pulse 86   Temp 97.8  F (36.6  C) (Oral)   Resp 16   Ht 1.626 m (5' 4\")   Wt 69.6 kg (153 lb 7 oz)   SpO2 98%   BMI 26.34 kg/m    UGI: no obstruction nor leak  S: Was able to visit some with patient with help of her niece who interpreted for us. Discussed UGI results and plan to remove NGT and advance to clear liquid diet.  O: NG: removed without difficulty  PAUL: serous  P: Clear liquid diet when upright in chair  Added liquid tylenol prn and dilaudid susp prn    Ila Cabrera PA-C with Dr. [facility]  Cell [phone]          "

## 2020-03-10 PROCEDURE — 12000000 ZZH R&B MED SURG/OB

## 2020-03-10 PROCEDURE — 25000128 H RX IP 250 OP 636: Performed by: PHYSICIAN ASSISTANT

## 2020-03-10 PROCEDURE — 25000132 ZZH RX MED GY IP 250 OP 250 PS 637: Performed by: PHYSICIAN ASSISTANT

## 2020-03-10 PROCEDURE — 25800030 ZZH RX IP 258 OP 636: Performed by: PHYSICIAN ASSISTANT

## 2020-03-10 RX ORDER — ACETAMINOPHEN 325 MG/1
325-650 TABLET ORAL EVERY 6 HOURS PRN
Qty: 100 TABLET | Refills: 0 | Status: SHIPPED | OUTPATIENT
Start: 2020-03-10 | End: 2022-04-05

## 2020-03-10 RX ORDER — AMOXICILLIN 250 MG
1 CAPSULE ORAL 2 TIMES DAILY
Status: DISCONTINUED | OUTPATIENT
Start: 2020-03-10 | End: 2020-03-11 | Stop reason: HOSPADM

## 2020-03-10 RX ORDER — AMOXICILLIN 250 MG
2 CAPSULE ORAL 2 TIMES DAILY PRN
Status: DISCONTINUED | OUTPATIENT
Start: 2020-03-10 | End: 2020-03-11 | Stop reason: HOSPADM

## 2020-03-10 RX ORDER — AMOXICILLIN 250 MG
1-2 CAPSULE ORAL 2 TIMES DAILY PRN
Qty: 30 TABLET | Refills: 0 | Status: SHIPPED | OUTPATIENT
Start: 2020-03-10 | End: 2021-11-15

## 2020-03-10 RX ORDER — HYDROMORPHONE HYDROCHLORIDE 2 MG/1
1-2 TABLET ORAL EVERY 6 HOURS PRN
Qty: 18 TABLET | Refills: 0 | Status: SHIPPED | OUTPATIENT
Start: 2020-03-10 | End: 2021-11-15

## 2020-03-10 RX ORDER — PANTOPRAZOLE SODIUM 40 MG/1
40 TABLET, DELAYED RELEASE ORAL
Status: DISCONTINUED | OUTPATIENT
Start: 2020-03-10 | End: 2020-03-11 | Stop reason: HOSPADM

## 2020-03-10 RX ORDER — AMOXICILLIN 250 MG
1 CAPSULE ORAL 2 TIMES DAILY PRN
Status: DISCONTINUED | OUTPATIENT
Start: 2020-03-10 | End: 2020-03-11 | Stop reason: HOSPADM

## 2020-03-10 RX ORDER — POLYETHYLENE GLYCOL 3350 17 G/17G
17 POWDER, FOR SOLUTION ORAL DAILY PRN
Status: DISCONTINUED | OUTPATIENT
Start: 2020-03-10 | End: 2020-03-11 | Stop reason: HOSPADM

## 2020-03-10 RX ORDER — AMOXICILLIN 250 MG
2 CAPSULE ORAL 2 TIMES DAILY
Status: DISCONTINUED | OUTPATIENT
Start: 2020-03-10 | End: 2020-03-11 | Stop reason: HOSPADM

## 2020-03-10 RX ORDER — BISACODYL 10 MG
10 SUPPOSITORY, RECTAL RECTAL DAILY PRN
Status: DISCONTINUED | OUTPATIENT
Start: 2020-03-10 | End: 2020-03-11 | Stop reason: HOSPADM

## 2020-03-10 RX ADMIN — PANTOPRAZOLE SODIUM 40 MG: 40 TABLET, DELAYED RELEASE ORAL at 10:28

## 2020-03-10 RX ADMIN — SENNOSIDES AND DOCUSATE SODIUM 1 TABLET: 8.6; 5 TABLET ORAL at 20:26

## 2020-03-10 RX ADMIN — ACETAMINOPHEN 650 MG: 325 SUSPENSION ORAL at 13:28

## 2020-03-10 RX ADMIN — ENOXAPARIN SODIUM 40 MG: 40 INJECTION SUBCUTANEOUS at 09:13

## 2020-03-10 RX ADMIN — ONDANSETRON 4 MG: 4 TABLET, ORALLY DISINTEGRATING ORAL at 17:36

## 2020-03-10 RX ADMIN — SENNOSIDES AND DOCUSATE SODIUM 2 TABLET: 8.6; 5 TABLET ORAL at 10:28

## 2020-03-10 RX ADMIN — SODIUM CHLORIDE: 9 INJECTION, SOLUTION INTRAVENOUS at 09:13

## 2020-03-10 RX ADMIN — ACETAMINOPHEN 650 MG: 325 SUSPENSION ORAL at 04:09

## 2020-03-10 ASSESSMENT — ACTIVITIES OF DAILY LIVING (ADL)
ADLS_ACUITY_SCORE: 12

## 2020-03-10 NOTE — DISCHARGE INSTRUCTIONS
"[facility]  Discharge Orders & Follow-up Care  Video-Assisted Thoracoscopy or Thoracotomy    You already have your follow-up appointment scheduled for you:  See Dr. Timmons at 10:00 am on Wednesday, March 18 at the [facility], [facility], [address] [place] [address], [address], [place] [address]. There will be an  there.    Call Cassia at Dr. Timmons  office at [phone] with any scheduling questions.      A. Patient Care:  Call Dr Timmons  office @ [phone] if you experience:  *Severe chills or a fever or 101 F or higher on two occasions  *Increased incisional pain that cannot be relieved with rest or pain medications  *Presence of unusual incisional or chest tube site drainage that is odorous, green or yellow in color, or if your incision is warm, red or swollen  *Coughing up bright red blood or greenish-yellow secretions  *Chest pain that gets worse with deep breathing or a significant increase in shortness of breath  *Inability to urinate or have a bowel movement  *New pain or swelling in your legs    In an emergency, call 231 or have someone drive you to the nearest Emergency Department    Pain Relief:  You may have been given a prescription for narcotic pain medicine.  You may also take acetaminophen.  Recommended dosage is: 325-650 mg Acetaminophen every 6 hours as needed.  Many patients get good pain relief by \"staggering\" these medications.     Constipation:  Narcotic pain medication, general anesthesia, and time in the hospital with less activity than normal can all cause constipation. Please take a stool softener (what you have at home or one that was prescribed during hospital discharge, such as Senokot-S, docusate sodium, Miralax, Milk of Magnesia) while you are taking narcotics to prevent constipation. Stop taking the stool softener once you are done taking narcotics or if you begin having loose stools/diarrhea. Please call our clinic " nurse, Daniela, at [phone] if you are not having success (not having BMs) with your current stool softener.     No driving while on narcotics.     Activity:  _XXX__ No heavy lifting greater than 20 pounds for 2 weeks    Wound Care:  *You should look at your incision each day and keep it clean while it heals  *Do not apply any creams, salves such as Bacitracin, or ointments on the incision while it is healing  * Steri strips (thin white paper strips) will be present on the incision(s) and they will peel off as your incision heals-- otherwise, they will be removed at your post-op appointment.  *It is OK to shower    B. Respiratory:  _XXX__ Utilize Incentive spirometer and flutter valve/acapella (if you received one) 10 times in a row every few hours while awake for a few weeks after discharging home from the hospital    C. Activity:  It may take a few weeks to regain your normal energy level/stamina. It is important during your recovery to get regular physical activity:  *walk each day at a comfortable pace  *climb stairs as tolerated  *take some rest periods each day but try not to take too many long naps, as this can affect your sleep at night    D. Returning to Work:  Time away from work will depend on your situation. In general, you will need between 2-4 weeks to recover from surgery. Specific dates for returning to work can be discussed at your post-op appointments.           Revised November 2018

## 2020-03-10 NOTE — PLAN OF CARE
A/O x4. VSS on RA. Up with SBA. Tolerating clear liquid diet. Lung sounds clear. Bowel sounds active. Passing flatus. No BM overnight. Adequate urine output via BR. Midline incision is WDL, covered with steri strips. PAUL WDL, dressing is CDI. Pain managed with prn tylenol suspension. Denies nausea.

## 2020-03-10 NOTE — CONSULTS
"CLINICAL NUTRITION SERVICES  -  ASSESSMENT NOTE      Recommendations Ordered by Registered Dietitian (RD): 4oz Boost Glucose Control BID between meals    Malnutrition:   % Weight Loss:  Up to 1-2% in 1 week (non-severe malnutrition)  % Intake:  </= 50% for >/= 5 days (severe malnutrition)  Subcutaneous Fat Loss:  None observed  Muscle Loss:  None observed  Fluid Retention:  None noted    Malnutrition Diagnosis: Non-Severe malnutrition  In Context of:  Acute illness or injury  Chronic illness or disease        REASON FOR ASSESSMENT  Beh Chandler is a 40 year old female seen by Registered Dietitian for LOS and Provider Order - Education:  Post Gastrectomy Diet       NUTRITION HISTORY  - Information obtained from daughter.  Was asked specifically to see patient at 2:00 pm when there would be an  available via phone.  However, discussed with RN just before 2:00 pm and apparently the  is now now available this afternoon.  - Per daughter, patient has not had anything to eat (solids) for the last 8 days.  She last ate about 8 days ago and was eating as per usual at that time (at baseline).  She has not used oral nutrition supplements in the past.       CURRENT NUTRITION ORDERS  Diet Order:     Full liquid      Current Intake/Tolerance:  Patient had chicken broth this morning, otherwise has not had anything to eat for the last 7-8 days       NUTRITION FOCUSED PHYSICAL ASSESSMENT FOR DIAGNOSING MALNUTRITION)  Yes               Observed:    No nutrition-related physical findings observed    Obtained from Chart/Interdisciplinary Team:  None     ANTHROPOMETRICS  Height: 5' 4\"  Weight: 69.6 kg (153#)(3/6)  Body mass index is 26.34 kg/m   Weight Status:  Overweight BMI 25-29.9  IBW: 54.5 kg   % IBW: 128%  Weight History:   Wt Readings from Last 10 Encounters:   03/06/20 69.6 kg (153 lb 7 oz)     Per daughter, patient's usual weight is 155#  Noted she was 156# on admit (down 3# or 2%)    LABS  Labs " reviewed    MEDICATIONS  Medications reviewed      ASSESSED NUTRITION NEEDS PER APPROVED PRACTICE GUIDELINES:    Dosing Weight 58.3 kg (adjusted)  Estimated Energy Needs: 9213-3489 kcals (25-30 Kcal/Kg)  Justification: overweight  Estimated Protein Needs: 70-90 grams protein (1.2-1.5 g pro/Kg)  Justification: post-op  Estimated Fluid Needs: 5640-5610 mL (1 mL/Kcal)  Justification: maintenance    MALNUTRITION:  % Weight Loss:  Up to 1-2% in 1 week (non-severe malnutrition)  % Intake:  </= 50% for >/= 5 days (severe malnutrition)  Subcutaneous Fat Loss:  None observed  Muscle Loss:  None observed  Fluid Retention:  None noted    Malnutrition Diagnosis: Non-Severe malnutrition  In Context of:  Acute illness or injury  Chronic illness or disease    NUTRITION DIAGNOSIS:  Inadequate protein-energy intake related to slow diet advancement post-op, now on full liquids only as evidenced by meeting minimal protein and energy intake - broth only over the last week       NUTRITION INTERVENTIONS  Recommendations / Nutrition Prescription  Full Liquid diet, ADAT per MD discretion   4oz Boost Glucose Control BID between meals   Post gastrectomy diet for discharge (low sugar, liquids between meals, low fiber)    Implementation  Nutrition education: Provided education on post gastrectomy diet as above   Medical Food Supplement:  Ordered as above     Nutrition Goals  Patient will consume 50% of 1-2 meals per day and 2 supplements per day     MONITORING AND EVALUATION:  Progress towards goals will be monitored and evaluated per protocol and Practice Guidelines    Linda Wolfe RD, LD, CNSC   Clinical Dietitian - [facility]

## 2020-03-10 NOTE — PLAN OF CARE
VSS. A/O. Ind. Abdo incision CDI. PAUL removed. Dressing CDI. Tylenol given once. Hypo BS/ no gas. Up ambulating independently. Tolerating full liquid.  service today got cancelled. Schedule for tomorrow. Family @ bedside. Pt understand some english.

## 2020-03-10 NOTE — PLAN OF CARE
Verified with  service that phone  confirmed for tomorrow 3/10 at 0900 and 1400 promptly. If not contacted within 15 min of said time,  will no longer be available (per service rep.)

## 2020-03-10 NOTE — PLAN OF CARE
"A&O, AVSS on RA. UGI today, NG removed, tolerating clears this afternoon-PO ok when upright per POC. Denies nausea. +flatus per pt, faint BS. Voiding adequately via bathroom. Family at bedside for part of shift, able to interpret, no  visit/phone service available. Pt able to make most needs known, writer/family able to order \"meals\" for pt. Midline MARVIN, no drainage, PAUL site CDI, small amount serousy output. Up SBA in hallways. Pain controlled with tylenol susp, dilaudid susp also avail now.   "

## 2020-03-10 NOTE — PROGRESS NOTES
"Thoracic Surgery POD #7:  /72   Pulse 78   Temp 98.1  F (36.7  C) (Oral)   Resp 16   Ht 1.626 m (5' 4\")   Wt 69.6 kg (153 lb 7 oz)   SpO2 98%   BMI 26.34 kg/m    PAUL: minimal serous output  S: Doing well- drinking clears without difficulty. Walking. Pain managed. States she does not have any need for  this morning.   O: Inc.: tender but abdomen soft  PAUL: removed without complication. Dry gauze dressing placed.  P: Advance to Full Liquid diet    Ila Cabrera PA-C with Dr. Eh [facility]  Cell [phone]      "

## 2020-03-10 NOTE — PLAN OF CARE
Neuro: Aox4. Limited English; translation services used. Flat affect.     Respiratory: RA. Clear LS.     Cardiac: WNL.     GI/: Bowel sounds audible x4 quads. BM x1 wk per pt. Adequate urine OP.     Skin: Midline incision WNL; adhesive tape present. Scant dried drainage.     Pain: Attributed to incisions; refer to MAR, FS.     Mobility: Ind.     Diet: FL. Poor appetite.    IVF: NS.    Plan of Care: Pain control; monitor incisions.       Will continue to monitor.

## 2020-03-11 VITALS
TEMPERATURE: 97.7 F | OXYGEN SATURATION: 99 % | BODY MASS INDEX: 26.2 KG/M2 | SYSTOLIC BLOOD PRESSURE: 103 MMHG | HEART RATE: 75 BPM | HEIGHT: 64 IN | RESPIRATION RATE: 16 BRPM | WEIGHT: 153.44 LBS | DIASTOLIC BLOOD PRESSURE: 70 MMHG

## 2020-03-11 PROCEDURE — 25000128 H RX IP 250 OP 636: Performed by: PHYSICIAN ASSISTANT

## 2020-03-11 PROCEDURE — 25000132 ZZH RX MED GY IP 250 OP 250 PS 637: Performed by: PHYSICIAN ASSISTANT

## 2020-03-11 PROCEDURE — 25800030 ZZH RX IP 258 OP 636: Performed by: PHYSICIAN ASSISTANT

## 2020-03-11 RX ADMIN — PANTOPRAZOLE SODIUM 40 MG: 40 TABLET, DELAYED RELEASE ORAL at 06:31

## 2020-03-11 RX ADMIN — ENOXAPARIN SODIUM 40 MG: 40 INJECTION SUBCUTANEOUS at 08:46

## 2020-03-11 RX ADMIN — ACETAMINOPHEN 650 MG: 325 SUSPENSION ORAL at 10:21

## 2020-03-11 RX ADMIN — SODIUM CHLORIDE: 9 INJECTION, SOLUTION INTRAVENOUS at 06:16

## 2020-03-11 RX ADMIN — SENNOSIDES AND DOCUSATE SODIUM 1 TABLET: 8.6; 5 TABLET ORAL at 08:46

## 2020-03-11 RX ADMIN — SODIUM CHLORIDE: 9 INJECTION, SOLUTION INTRAVENOUS at 03:14

## 2020-03-11 RX ADMIN — ACETAMINOPHEN 650 MG: 325 SUSPENSION ORAL at 06:31

## 2020-03-11 ASSESSMENT — ACTIVITIES OF DAILY LIVING (ADL)
ADLS_ACUITY_SCORE: 12

## 2020-03-11 NOTE — PLAN OF CARE
Pt discharged to home via private car accompanied by family. Prescriptions filled and sent with pt. Family assisted with discharge instructions. Pt and family state understanding. No further needs noted.

## 2020-03-11 NOTE — PROGRESS NOTES
THORACIC SURGERY POD # 8    Doing well  avss tolerating full liquid diet  BM +    Abdomen soft  Wound OK    D/C today     PACO KHOURY MD [facility] ONCOLOGY THORACIC SURGERY  CELL:  [phone]  OFFICE: [phone]

## 2020-03-12 ENCOUNTER — COMMUNICATION - HEALTHEAST (OUTPATIENT)
Dept: NURSING | Facility: CLINIC | Age: 40
End: 2020-03-12

## 2020-03-13 ENCOUNTER — OFFICE VISIT - HEALTHEAST (OUTPATIENT)
Dept: FAMILY MEDICINE | Facility: CLINIC | Age: 40
End: 2020-03-13

## 2020-03-13 DIAGNOSIS — Z98.890 RECENT MAJOR SURGERY: ICD-10-CM

## 2020-03-13 DIAGNOSIS — Z23 IMMUNIZATION DUE: ICD-10-CM

## 2020-03-13 DIAGNOSIS — G89.18 POSTOPERATIVE PAIN: ICD-10-CM

## 2020-03-13 DIAGNOSIS — C16.9 GASTRIC ADENOCARCINOMA (H): ICD-10-CM

## 2020-03-13 ASSESSMENT — MIFFLIN-ST. JEOR: SCORE: 1312.82

## 2020-03-19 NOTE — DISCHARGE SUMMARY
THORACIC SURGERY HOSPITAL DISCHARGE SUMMARY  [facility] ONCOLOGY - THORACIC SURGERY  [address], [address]  [place] [address]  Phone [phone]  [email or web address]    3/19/2020     No Ref-Primary, Physician   No address on file  Phone: None   Fax: [phone]        Re: Beh Meh             1980             [id number]              Dates of Hospitalization: 3/3/2020 - 3/11/2020   Date of Service (when I saw the patient): 3/11/20    Dear Dr. Coughlin Ref-Primary, Physician:     As you are aware, we had the pleasure of caring for your patient,  Beh Meh here at [facility].  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  She underwent chemotherapy with some progression of the tumor locally.  The most recent endoscopy with EUS did not show any evidence of adenopathy and the tumor was classified as T3 N0 M0.  Based on the findings, a resection is indicated for treatment.     On 3/3/2020, Dr. Eh Timmons performed the following:    Procedure/Surgery Information   Procedure: Abdominal exploration with distal partial gastrectomy and Billroth II gastrojejunostomy reconstruction.  Antecolic and antiperistaltic.    Surgeon(s): Surgeon(s) and Role:     * Eh Timmons MD - Primary     * Ila Cabrera PA-C - Assisting   Specimens: ID Type Source Tests Collected by Time Destination   A : distal gastrectomy Tissue Stomach, Body SURGICAL PATHOLOGY EXAM Eh Timmons MD 3/3/2020  1:58 PM             Final Surgical Pathology Revealed:  Poorly differentiated adenocarcinoma, mixed intestinal and diffuse type, extending through muscularis propria into perigastric soft tissue, resection margins free of malignancy, isolated tumor cells in one of 19 perigastric lymph nodes. Final pathologic Stage T3N0M0.     Additional discharge diagnosis: Non-severe malnutrition in context  of acute illness on chronic illness    Her post-operative course was unremarkable. She underwent an UGI that showed no obstruction nor leak on 3/09/20.      Consultations This Hospital Stay   NUTRITION SERVICES ADULT IP CONSULT    Beh Meh has otherwise recovered sufficiently to be discharged to home today, 3/11/2020, on post-operative day number eight for further convalescence.  Her incisions are healing well with no signs or symptoms of infection.  Her bowels have moved sufficiently and she is tolerating a full liquid diet and activity, ambulating and transferring independently.  She is currently afebrile with stable vital signs.     Below, you will find a full discharge medication list and instructions.  We have arranged for Beh Meh to follow-up with us in our [facility] in 7-10 days with a Chest X-ray prior to that appointment.  We thank you for allowing us to participate in the care of Beh Meh here at [facility].  Please feel free to contact our office at [phone] with any questions or concerns or if we can be of any further assistance in the care of this patient.    Sincerely,    Dr. Eh Timmons MD    D/C Summary Prepared by: Ila Cabrera PA-C    Discharge Medications:   Meh, Beh   Home Medication Instructions PAUL:[id number]    Printed on:03/19/20 1322   Medication Information                      acetaminophen (TYLENOL) 325 MG tablet  Take 1-2 tablets (325-650 mg) by mouth every 6 hours as needed for mild pain             alum & mag hydroxide-simethicone (MAALOX) 200-200-20 MG/5ML SUSP suspension  Take 15 mLs by mouth 3 times daily (before meals)             dexamethasone (DECADRON) 4 MG tablet  Take 8 mg by mouth See Admin Instructions Take 2 tablets twice daily on the day before, day of, and day after chemo.             HYDROmorphone (DILAUDID) 2 MG tablet  Take 0.5-1 tablets (1-2 mg) by mouth every 6 hours as needed for severe pain             lidocaine-prilocaine (EMLA) 2.5-2.5  % external cream  Apply topically daily as needed for moderate pain (For port)             LORazepam (ATIVAN) 0.5 MG tablet  Take 0.5 mg by mouth every 4 hours as needed for anxiety             omeprazole 20 MG tablet  Take 20 mg by mouth 2 times daily              polyethylene glycol (MIRALAX) powder  Take 17 g by mouth daily as needed for constipation             prochlorperazine (COMPAZINE) 10 MG tablet  Take 10 mg by mouth every 6 hours as needed for nausea or vomiting             senna-docusate (SENOKOT-S/PERICOLACE) 8.6-50 MG tablet  Take 1-2 tablets by mouth 2 times daily as needed for constipation                   Discharge Instructions:  1) Iit is Ok to shower.  Please wash both incision daily with soap and water.   2) Steri-strips can be removed in 1 week or they will fall off when they are ready.  3) Continue daily use of your Incentive Spirometer, set of 10x in a row, every 1-2 hours while you are awake during the day. Also use your flutter valve if you received one during your stay.   4) No lifting, pushing or pulling >8-10 lbs for 4-6 weeks from the day of your surgery.  5) No driving while on narcotic pain medications.    Follow-Up Care:  1) Follow up with Ila Cabrera PA-C/Dr. Timmons at the [facility] in [facility] ([address] [place] [address], [address], [place] [address]).  Call Cassia at [phone] to schedule the appointment.  2) Follow up with Primary Care Provider, No Ref-Primary, Physician within 1 month of discharge for routine post-surgical care, wound check and follow up.  Please call None to arrange this appointment.       CC  Patient Care Team:  No Ref-Primary, Physician as PCP - General

## 2020-03-24 ENCOUNTER — RECORDS - HEALTHEAST (OUTPATIENT)
Dept: ADMINISTRATIVE | Facility: OTHER | Age: 40
End: 2020-03-24

## 2020-03-26 ENCOUNTER — COMMUNICATION - HEALTHEAST (OUTPATIENT)
Dept: FAMILY MEDICINE | Facility: CLINIC | Age: 40
End: 2020-03-26

## 2020-03-26 DIAGNOSIS — Z09 HOSPITAL DISCHARGE FOLLOW-UP: ICD-10-CM

## 2020-03-30 ENCOUNTER — RECORDS - HEALTHEAST (OUTPATIENT)
Dept: ADMINISTRATIVE | Facility: OTHER | Age: 40
End: 2020-03-30

## 2020-04-13 ENCOUNTER — RECORDS - HEALTHEAST (OUTPATIENT)
Dept: ADMINISTRATIVE | Facility: OTHER | Age: 40
End: 2020-04-13

## 2020-04-13 ENCOUNTER — COMMUNICATION - HEALTHEAST (OUTPATIENT)
Dept: NURSING | Facility: CLINIC | Age: 40
End: 2020-04-13

## 2020-04-13 ENCOUNTER — HOME INFUSION (PRE-WILLOW HOME INFUSION) (OUTPATIENT)
Dept: PHARMACY | Facility: CLINIC | Age: 40
End: 2020-04-13

## 2020-04-14 NOTE — PROGRESS NOTES
This is a recent snapshot of the patient's [facility] medical record.  For current drug dose and complete information and questions, call [phone] or In Basket pool, [facility] ([id number])  CSN Number:  [id number]

## 2020-05-12 ENCOUNTER — RECORDS - HEALTHEAST (OUTPATIENT)
Dept: ADMINISTRATIVE | Facility: OTHER | Age: 40
End: 2020-05-12

## 2020-05-19 ENCOUNTER — COMMUNICATION - HEALTHEAST (OUTPATIENT)
Dept: NURSING | Facility: CLINIC | Age: 40
End: 2020-05-19

## 2020-05-26 ENCOUNTER — RECORDS - HEALTHEAST (OUTPATIENT)
Dept: ADMINISTRATIVE | Facility: OTHER | Age: 40
End: 2020-05-26

## 2020-06-09 ENCOUNTER — RECORDS - HEALTHEAST (OUTPATIENT)
Dept: ADMINISTRATIVE | Facility: OTHER | Age: 40
End: 2020-06-09

## 2020-06-19 ENCOUNTER — COMMUNICATION - HEALTHEAST (OUTPATIENT)
Dept: NURSING | Facility: CLINIC | Age: 40
End: 2020-06-19

## 2020-06-25 ENCOUNTER — COMMUNICATION - HEALTHEAST (OUTPATIENT)
Dept: CARE COORDINATION | Facility: CLINIC | Age: 40
End: 2020-06-25

## 2020-07-06 ENCOUNTER — HOSPITAL ENCOUNTER (OUTPATIENT)
Dept: CT IMAGING | Facility: HOSPITAL | Age: 40
Discharge: HOME | End: 2020-07-06
Attending: INTERNAL MEDICINE

## 2020-07-06 DIAGNOSIS — C16.9 GASTRIC CANCER (H): ICD-10-CM

## 2020-07-06 LAB
CREAT BLD-MCNC: 0.4 MG/DL (ref 0.6–1.1)
GFR SERPL CREATININE-BSD FRML MDRD: >60 ML/MIN/1.73M2

## 2020-07-10 ENCOUNTER — RECORDS - HEALTHEAST (OUTPATIENT)
Dept: ADMINISTRATIVE | Facility: OTHER | Age: 40
End: 2020-07-10

## 2020-07-13 ENCOUNTER — AMBULATORY - HEALTHEAST (OUTPATIENT)
Dept: SURGERY | Facility: HOSPITAL | Age: 40
End: 2020-07-13

## 2020-07-13 ENCOUNTER — RECORDS - HEALTHEAST (OUTPATIENT)
Dept: ADMINISTRATIVE | Facility: OTHER | Age: 40
End: 2020-07-13

## 2020-07-13 DIAGNOSIS — Z11.59 ENCOUNTER FOR SCREENING FOR OTHER VIRAL DISEASES: ICD-10-CM

## 2020-07-20 ENCOUNTER — HOSPITAL ENCOUNTER (OUTPATIENT)
Dept: INTERVENTIONAL RADIOLOGY/VASCULAR | Facility: HOSPITAL | Age: 40
Discharge: HOME | End: 2020-07-20
Attending: INTERNAL MEDICINE | Admitting: RADIOLOGY

## 2020-07-20 DIAGNOSIS — C16.9 GASTRIC CANCER (H): ICD-10-CM

## 2020-07-20 LAB — HCG UR QL: NEGATIVE

## 2020-08-18 ENCOUNTER — COMMUNICATION - HEALTHEAST (OUTPATIENT)
Dept: NURSING | Facility: CLINIC | Age: 40
End: 2020-08-18

## 2020-08-20 ENCOUNTER — COMMUNICATION - HEALTHEAST (OUTPATIENT)
Dept: CARE COORDINATION | Facility: CLINIC | Age: 40
End: 2020-08-20

## 2020-09-04 ENCOUNTER — RECORDS - HEALTHEAST (OUTPATIENT)
Dept: ADMINISTRATIVE | Facility: OTHER | Age: 40
End: 2020-09-04

## 2020-09-15 ENCOUNTER — OFFICE VISIT - HEALTHEAST (OUTPATIENT)
Dept: FAMILY MEDICINE | Facility: CLINIC | Age: 40
End: 2020-09-15

## 2020-09-15 DIAGNOSIS — B18.1 CHRONIC VIRAL HEPATITIS B WITHOUT DELTA AGENT AND WITHOUT COMA (H): ICD-10-CM

## 2020-09-15 DIAGNOSIS — M54.50 MIDLINE LOW BACK PAIN WITHOUT SCIATICA, UNSPECIFIED CHRONICITY: ICD-10-CM

## 2020-09-15 DIAGNOSIS — Z23 IMMUNIZATION DUE: ICD-10-CM

## 2020-09-15 DIAGNOSIS — C16.9 GASTRIC ADENOCARCINOMA (H): ICD-10-CM

## 2020-09-15 ASSESSMENT — MIFFLIN-ST. JEOR: SCORE: 1318.21

## 2020-09-15 ASSESSMENT — PATIENT HEALTH QUESTIONNAIRE - PHQ9: SUM OF ALL RESPONSES TO PHQ QUESTIONS 1-9: 7

## 2020-10-02 ENCOUNTER — RECORDS - HEALTHEAST (OUTPATIENT)
Dept: ADMINISTRATIVE | Facility: OTHER | Age: 40
End: 2020-10-02

## 2020-10-09 ENCOUNTER — COMMUNICATION - HEALTHEAST (OUTPATIENT)
Dept: FAMILY MEDICINE | Facility: CLINIC | Age: 40
End: 2020-10-09

## 2020-10-14 ENCOUNTER — RECORDS - HEALTHEAST (OUTPATIENT)
Dept: ADMINISTRATIVE | Facility: OTHER | Age: 40
End: 2020-10-14

## 2020-10-29 ENCOUNTER — HOSPITAL ENCOUNTER (OUTPATIENT)
Dept: CT IMAGING | Facility: HOSPITAL | Age: 40
Discharge: HOME | End: 2020-10-29
Attending: INTERNAL MEDICINE

## 2020-10-29 DIAGNOSIS — C16.9 GASTRIC CANCER (H): ICD-10-CM

## 2021-02-02 ENCOUNTER — RECORDS - HEALTHEAST (OUTPATIENT)
Dept: MAMMOGRAPHY | Facility: CLINIC | Age: 41
End: 2021-02-02

## 2021-02-02 DIAGNOSIS — Z12.31 ENCOUNTER FOR SCREENING MAMMOGRAM FOR MALIGNANT NEOPLASM OF BREAST: ICD-10-CM

## 2021-05-04 ENCOUNTER — RECORDS - HEALTHEAST (OUTPATIENT)
Dept: ADMINISTRATIVE | Facility: OTHER | Age: 41
End: 2021-05-04

## 2021-05-26 ASSESSMENT — PATIENT HEALTH QUESTIONNAIRE - PHQ9: SUM OF ALL RESPONSES TO PHQ QUESTIONS 1-9: 24

## 2021-05-27 ASSESSMENT — PATIENT HEALTH QUESTIONNAIRE - PHQ9: SUM OF ALL RESPONSES TO PHQ QUESTIONS 1-9: 7

## 2021-05-31 VITALS — BODY MASS INDEX: 29.19 KG/M2 | HEIGHT: 64 IN | WEIGHT: 171 LBS

## 2021-06-01 VITALS — BODY MASS INDEX: 29.03 KG/M2 | WEIGHT: 170.06 LBS | HEIGHT: 64 IN

## 2021-06-01 VITALS — BODY MASS INDEX: 29.08 KG/M2 | HEIGHT: 64 IN | WEIGHT: 170.31 LBS

## 2021-06-01 VITALS — HEIGHT: 64 IN | BODY MASS INDEX: 29.02 KG/M2 | WEIGHT: 170 LBS

## 2021-06-01 VITALS — BODY MASS INDEX: 29.62 KG/M2 | WEIGHT: 173.5 LBS | HEIGHT: 64 IN

## 2021-06-01 NOTE — TELEPHONE ENCOUNTER
Called patient left message with patient family member to call clinic back.If patient calls back please help schedule appointment with  Patient is due for Pap smear  would like her to have.

## 2021-06-02 VITALS — WEIGHT: 165.44 LBS | HEIGHT: 64 IN | BODY MASS INDEX: 28.24 KG/M2

## 2021-06-02 NOTE — PROGRESS NOTES
OFFICE VISIT NOTE      Assessment & Plan   Beh Meh is a 39 y.o. female with abd pain which is likely an H pylori recurrence. Will send her for endoscopy urgently. Meanwhile resume GI meds. Ruled out anything in her throat by xray.  Urine and wet prep are normal - not sure what caused her dysuria  Hep B also needs to be checked - will do labs in the future.    Diagnoses and all orders for this visit:    Dysuria  -     Urinalysis  -     Culture, Urine  -     Wet Prep, Vaginal    Other dysphagia  -     XR Neck Soft Tissue; Future; Expected date: 10/22/2019  -     Ambulatory referral for Upper GI Endoscopy  -     aluminum-magnesium hydroxide-simethicone (MAALOX ADVANCED) 200-200-20 mg/5 mL Susp; Take 15 mL by mouth 3 (three) times a day with meals.  Dispense: 769 mL; Refill: 6  -     omeprazole (PRILOSEC) 20 MG capsule; Take 1 capsule (20 mg total) by mouth 2 (two) times a day before meals.  Dispense: 60 capsule; Refill: 3    Foreign body sensation in throat  -     XR Neck Soft Tissue; Future; Expected date: 10/22/2019  -     Ambulatory referral for Upper GI Endoscopy  -     Pregnancy, Urine    Gastroesophageal reflux disease with esophagitis  -     Ambulatory referral for Upper GI Endoscopy  -     aluminum-magnesium hydroxide-simethicone (MAALOX ADVANCED) 200-200-20 mg/5 mL Susp; Take 15 mL by mouth 3 (three) times a day with meals.  Dispense: 769 mL; Refill: 6  -     omeprazole (PRILOSEC) 20 MG capsule; Take 1 capsule (20 mg total) by mouth 2 (two) times a day before meals.  Dispense: 60 capsule; Refill: 3    Chronic viral hepatitis B without delta agent and without coma (H)  -     Hepatic Profile; Future; Expected date: 10/23/2019  -     Hepatitis B Chronic Evaluation; Future; Expected date: 10/23/2019  -     HM1(CBC and Differential); Future; Expected date: 10/23/2019  -     Basic Metabolic Panel; Future; Expected date: 10/23/2019    Helicobacter pylori antibody positive  -     Hepatic Profile; Future; Expected  "date: 10/23/2019    Healthcare maintenance  -     HIV Antigen/Antibody Diagnostic Cascade; Future; Expected date: 10/23/2019        Casandra Pereira MD    The following are part of a depression follow up plan for the patient:  coping support assessment and coping support management  The following high BMI interventions were performed this visit: encouragement to exercise          Subjective:   Chief Complaint:  Abdominal Pain    39 y.o. female.     1) 3 weeks of increasing stomach pain, burning with urination  Started low and moved upward  Throat - feels like something is in there- moving! She wants an xray  Denies new/extra stress    2) no vaginal discharge but vaginal burning    Current Outpatient Medications   Medication Sig     acetaminophen (TYLENOL) 325 MG tablet Take 2 tablets (650 mg total) by mouth every 4 (four) hours as needed for pain.     aluminum-magnesium hydroxide-simethicone (MAALOX ADVANCED) 200-200-20 mg/5 mL Susp Take 15 mL by mouth 3 (three) times a day with meals.     omeprazole (PRILOSEC) 20 MG capsule Take 1 capsule (20 mg total) by mouth 2 (two) times a day before meals.     sucralfate (CARAFATE) 1 gram tablet TAKE 1 TABLET BY MOUTH FOUR TIMES A DAY..     sucralfate (CARAFATE) 100 mg/mL suspension Take 10 mL (1 g total) by mouth 4 (four) times a day.       PSFHx: appropriate sections of PMH completed/filled in   Tobacco Status:  She  reports that she has never smoked. She has never used smokeless tobacco.    Review of Systems:  No fever.  No menses problems. All other systems negative except as noted above.    Objective:    /72 (Patient Site: Left Arm, Patient Position: Sitting, Cuff Size: Adult Regular)   Pulse 72   Temp 98.3  F (36.8  C) (Oral)   Resp 20   Ht 5' 4\" (1.626 m)   Wt 157 lb 8 oz (71.4 kg)   LMP 09/16/2019 (Approximate)   BMI 27.03 kg/m    GENERAL: No acute distress.  Ht: reg s1s2  Lungs: clear  Abd: +BS, soft, ND/tender in epigastrium, no masses, no rebound " tenderness, some pain on left flank    UA reviewed with patient  UC pending    Neck xray: reviewed by me with patient + radiologist's over-read reviewed with patient    Spent 45 min face to face with patient with more the 50% spent in counseling, education and coordination of care and discussing UTI, GERD, hep B and swallowing.

## 2021-06-02 NOTE — TELEPHONE ENCOUNTER
Called and spoke to daughter she will give message to her Mother to call us back to schedule appt.

## 2021-06-02 NOTE — TELEPHONE ENCOUNTER
Prior Authorization Request  Who s requesting:  Pharmacy  Pharmacy Name and Location: [facility]   Medication Name: omeprazole 20 mg two times a day  Insurance Plan: [employer] [place]  Insurance Member ID Number:  [id number]  Informed patient that prior authorizations can take up to 10 business days for response:   No  Okay to leave a detailed message: No

## 2021-06-02 NOTE — TELEPHONE ENCOUNTER
Central PA team  [phone]  Pool: HE PA MED ([id number])          PA has been initiated.       PA form completed and faxed insurance via Cover My Meds     Key:  [id number]     Medication:  Omeprazole 20MG dr capsules    Insurance:  [employer]        Response will be received via fax and may take up to 5-10 business days depending on plan

## 2021-06-03 ENCOUNTER — OFFICE VISIT - HEALTHEAST (OUTPATIENT)
Dept: FAMILY MEDICINE | Facility: CLINIC | Age: 41
End: 2021-06-03

## 2021-06-03 VITALS
BODY MASS INDEX: 26.89 KG/M2 | RESPIRATION RATE: 20 BRPM | HEIGHT: 64 IN | WEIGHT: 157.5 LBS | TEMPERATURE: 98.3 F | HEART RATE: 72 BPM | DIASTOLIC BLOOD PRESSURE: 72 MMHG | SYSTOLIC BLOOD PRESSURE: 104 MMHG

## 2021-06-03 DIAGNOSIS — Z12.4 SCREENING FOR CERVICAL CANCER: ICD-10-CM

## 2021-06-03 DIAGNOSIS — N64.4 BREAST TENDERNESS IN FEMALE: ICD-10-CM

## 2021-06-03 DIAGNOSIS — Z00.00 ROUTINE GENERAL MEDICAL EXAMINATION AT A HEALTH CARE FACILITY: ICD-10-CM

## 2021-06-03 DIAGNOSIS — C16.9 GASTRIC ADENOCARCINOMA (H): ICD-10-CM

## 2021-06-03 ASSESSMENT — MIFFLIN-ST. JEOR: SCORE: 1409.21

## 2021-06-03 NOTE — PROGRESS NOTES
H&P documented within 30 days (by Milagros Price CNP on 11/22/19).  I have also reviewed the pertinent progress notes since the initial H&P. I have performed an assessment and examined the patient, as necessary, to update the patient's current status that may have changed.

## 2021-06-03 NOTE — PROCEDURES
[facility]    Procedure: IR Procedure Note  Date/Time: 11/22/2019 10:16 AM  Performed by: Francis Page MD  Authorized by: Francis Page MD       Universal Protocol    Site marked: NA    Prior images obtained and reviewed: NA    Required items: required blood products, implants, devices, and special equipment available    Patient identity confirmed: verbally with patient, arm band, provided demographic data and hospital-assigned identification number    Reevaluation: Patient was reevaluated immediately before administering moderate or deep sedation or anesthesia    Confirmation checklist: patient's identity using two indicators, relevant allergies, procedure was appropriate and matched the consent or emergent situation and correct equipment/implants were available    Time out: Immediately prior to procedure a time out was called to verify the correct patient, procedure, equipment, support staff and site/side marked as required    Universal Protocol: Joint Commission Universal Protocol was followed    Preparation: Patient was prepped and draped in the usual sterile fashion    Anesthesia    Local anesthesia used?: Yes    Sedation    Patient sedation: Yes    Vital signs: Vital signs monitored during sedation  Specimens: none  Complications: None  Condition: Stable    Post-procedure   Length of time physician present for 1:1 monitoring during sedation: 30

## 2021-06-03 NOTE — TELEPHONE ENCOUNTER
report indicate that the patient needs Physical pap smear / immunization update writer intends to contact the patient to assist in scheduling appointment. Called patient via  line and help schedule appointment. Patient requested transportation arrangement for December 13, 2019 at 8:20 am appointment.

## 2021-06-03 NOTE — PRE-PROCEDURE
Procedure Name: right chest port placement   Date/Time: 11/22/2019 9:04 AM  Written consent obtained?: Yes  Risks and benefits: Risks, benefits and alternatives were discussed  Consent given by: patient  Expected level of sedation: moderate  ASA Class: Class 3- Severe systemic disease, definite functional limitations  Mallampati: Grade 1- soft palate, uvula, tonsillar pillars, and posterior pharyngeal wall visible  Patient states understanding of procedure being performed: Yes  Patient's understanding of procedure matches consent: Yes  Procedure consent matches procedure scheduled: Yes  Appropriately NPO: yes  Lungs: lungs clear with good breath sounds bilaterally  Heart: normal heart sounds and rate  History & Physical reviewed: Full history and physical done prior to deep sedation  Statement of review: I have reviewed the lab findings, diagnostic data, medications, and the plan for sedation

## 2021-06-04 VITALS
OXYGEN SATURATION: 97 % | DIASTOLIC BLOOD PRESSURE: 56 MMHG | WEIGHT: 156.56 LBS | HEIGHT: 64 IN | TEMPERATURE: 97.5 F | HEART RATE: 74 BPM | BODY MASS INDEX: 26.73 KG/M2 | SYSTOLIC BLOOD PRESSURE: 90 MMHG

## 2021-06-04 VITALS
HEIGHT: 66 IN | RESPIRATION RATE: 18 BRPM | TEMPERATURE: 97.8 F | WEIGHT: 148.19 LBS | HEART RATE: 88 BPM | DIASTOLIC BLOOD PRESSURE: 78 MMHG | BODY MASS INDEX: 23.82 KG/M2 | OXYGEN SATURATION: 98 % | SYSTOLIC BLOOD PRESSURE: 100 MMHG

## 2021-06-04 VITALS — WEIGHT: 157 LBS | HEIGHT: 64 IN | BODY MASS INDEX: 26.8 KG/M2

## 2021-06-04 VITALS
TEMPERATURE: 98.1 F | BODY MASS INDEX: 26.59 KG/M2 | HEIGHT: 64 IN | HEART RATE: 82 BPM | WEIGHT: 155.75 LBS | OXYGEN SATURATION: 98 % | RESPIRATION RATE: 20 BRPM | SYSTOLIC BLOOD PRESSURE: 94 MMHG | DIASTOLIC BLOOD PRESSURE: 64 MMHG

## 2021-06-04 VITALS
TEMPERATURE: 98 F | BODY MASS INDEX: 26.75 KG/M2 | WEIGHT: 151 LBS | RESPIRATION RATE: 12 BRPM | SYSTOLIC BLOOD PRESSURE: 100 MMHG | HEART RATE: 80 BPM | OXYGEN SATURATION: 97 % | DIASTOLIC BLOOD PRESSURE: 60 MMHG | HEIGHT: 63 IN

## 2021-06-04 VITALS
TEMPERATURE: 97.6 F | DIASTOLIC BLOOD PRESSURE: 66 MMHG | OXYGEN SATURATION: 97 % | BODY MASS INDEX: 24.95 KG/M2 | HEART RATE: 70 BPM | SYSTOLIC BLOOD PRESSURE: 98 MMHG | WEIGHT: 146.13 LBS | RESPIRATION RATE: 20 BRPM | HEIGHT: 64 IN

## 2021-06-04 VITALS — BODY MASS INDEX: 24.72 KG/M2 | WEIGHT: 144 LBS

## 2021-06-04 LAB
HPV SOURCE: NORMAL
HUMAN PAPILLOMA VIRUS 16 DNA: NEGATIVE
HUMAN PAPILLOMA VIRUS 18 DNA: NEGATIVE
HUMAN PAPILLOMA VIRUS FINAL DIAGNOSIS: NORMAL
HUMAN PAPILLOMA VIRUS OTHER HR: NEGATIVE
SPECIMEN DESCRIPTION: NORMAL

## 2021-06-04 NOTE — TELEPHONE ENCOUNTER
4. Atrophic gastritis without hemorrhage  Upper GI done in 4/18.  Negative H. pylori.  Atrophic gastritis, recommended PPI.  Will increase omeprazole to 20 mg twice daily.  She will use sucralfate as needed for severe symptom.

## 2021-06-04 NOTE — PROGRESS NOTES
FEMALE PREVENTATIVE EXAM    Assessment and Plan:       1. Routine general medical examination at a health care facility  Due for pap but patient declined.  We will defer Pap until completing chemo and other treatments regarding stomach cancer.  We will schedule for mammogram next month.    2. Malignant neoplasm of stomach, unspecified location (H)  Managed by oncology.  Currently on chemotherapy.  Evaluated by surgery, pending surgery plan.  Oncology appointment today.    3. Need for influenza vaccination  Reviewed up-to-date articles on influenza vaccine and chemotherapy.  Recommended inactive influenza vaccine yearly during chemotherapy.  - Influenza, Seasonal Quad, PF =/> 6months    Next follow up:  No follow-ups on file.    Immunization Review  Adult Imm Review: Due today, orders placed      I discussed the following with the patient:   Adult Healthy Living: depression symptoms, indications to call        Subjective:   Chief Complaint: Beh Meh is an 39 y.o. female here for a preventative health visit.     HPI: Patient is here for physical.  Recently diagnosed with stomach cancer, currently receiving chemotherapy.  Followed by oncology.  Complaining of nausea and throat pain after chemo.  Taking Zofran as needed.  Feeling down due to recent cancer diagnosis.  Denied suicidal homicidal ideations.  Has good family support.    Healthy Habits  Are you taking a daily aspirin? No  Do you typically exercising at least 40 min, 3-4 times per week?  NO  Do you usually eat at least 4 servings of fruit and vegetables a day, include whole grains and fiber and avoid regularly eating high fat foods? NO  Have you had an eye exam in the past two years? NO  Do you see a dentist twice per year? Yes  Do you have any concerns regarding sleep? YES    Safety Screen  If you own firearms, are they secured in a locked gun cabinet or with trigger locks? Yes, hunting gun  Do you feel you are safe where you are living?: Yes (12/13/2019  8:35  "AM)  Do you feel you are safe in your relationship(s)?: Yes (12/13/2019  8:35 AM)      Review of Systems:  No fever.  No abnormal vaginal bleeding.  No shortness of breath or wheezing.      Cancer Screening       Status Date      PAP SMEAR Overdue 12/2/2018      Done 12/2/2013 GYNECOLOGIC CYTOLOGY (PAP SMEAR)              History     Reviewed By Date/Time Sections Reviewed    Saw, Luis Gibson DANIEL 12/13/2019  8:38 AM Tobacco            Objective:   Vital Signs:   Visit Vitals  /78 (Patient Site: Right Arm, Patient Position: Sitting, Cuff Size: Adult Regular)   Pulse 88   Temp 97.8  F (36.6  C) (Oral)   Resp 18   Ht 5' 5.98\" (1.676 m)   Wt 148 lb 3 oz (67.2 kg)   LMP 11/26/2019 (Exact Date)   SpO2 98%   BMI 23.93 kg/m           PHYSICAL EXAM  Gen - alert, orientated, NAD  Eyes - fundascopic exam limited by the undialated pupil but looks symmetric  ENT - oropharynx clear, TMs clear  Neck - supple, no palpable mass or lymphadenopathy  CV - RRR, no murmur  Breast - no dominant mass on either side, no axillary mass.  Resp - lungs CTA  Ab - soft, no ascites.  No palpable masses.   - Declined, deferred.   Extrem - warm, no edema  Neuro - CN II-XII intact, strength, sensation, reflexes intact and symmetric  Skin - no rash.  No atypical appearing lesions seen.               Additional Screenings Completed Today:     "

## 2021-06-04 NOTE — TELEPHONE ENCOUNTER
Pharmacy notified. Pharmacy will add a note that the patient is not taking at this time due to chemotherapy for gastric cancer.

## 2021-06-04 NOTE — PROGRESS NOTES
The Clinic Community Health Worker spoke with the patient today to discuss possible Clinic Care Coordination enrollment.  The service was described to the patient and immediate needs were discussed.  The patient agreed to enrollment and an assessment was scheduled.  The patient was provided with contact information for the clinic CHW.        Assessment date: 12/17       Referral Reason: Recently diagnosed with stomach cancer. Needs help navigating care including multiple specialty visits. Patient states that she forgets to take her medications at times.

## 2021-06-05 VITALS
HEIGHT: 64 IN | BODY MASS INDEX: 25.15 KG/M2 | HEART RATE: 70 BPM | SYSTOLIC BLOOD PRESSURE: 92 MMHG | WEIGHT: 147.31 LBS | OXYGEN SATURATION: 96 % | TEMPERATURE: 98 F | DIASTOLIC BLOOD PRESSURE: 68 MMHG

## 2021-06-05 NOTE — TELEPHONE ENCOUNTER
New Appointment Needed  What is the reason for the visit:    Inpatient/ED Follow Up Appt Request  At what hospital or facility were you seen?: [facility]  What is the reason you were seen?: GI bleed  What date were you admitted?: date: 01/19  What date were you discharged?: date: 01/21  What was the recommended timeframe for your follow up appointment?: 1-2 days  Provider Preference: spoke with discharge nurse - not sure about provider preference   How soon do you need to be seen?: within 2 days  Waitlist offered?: No  Okay to leave a detailed message:  Yes

## 2021-06-05 NOTE — TELEPHONE ENCOUNTER
----- Message from Afua Fritz MD sent at 1/27/2020  9:49 AM CST -----  Can you confirm when this f/u appt is with [facility]?     Per D/c summary:   follow-up as scheduled with Dr. Eh Timmons at [facility] to discuss surgical resection of the cancerous mass.       [place]ie  Family can drive her to appt      Dr. Afua Fritz  1/27/2020

## 2021-06-05 NOTE — PROGRESS NOTES
Clinic Care Coordination Contact  Clinic Care Coordination Medication Education FOLLOW UP Visit    Patient presents for:  Medication education, Pill box teaching, Compliance monitoring and Medication reconciliation    Language: Tamanna    Communication: Literate in other languages    Accompanied by: accompanied by Tamanna     Patient Living Situation:  Dependent with family    Primary Care Provider:  Tk Jay MD    Barriers:  Financial, Language, Cultural, Poor insight into disease process, Inadequate support at home, Non-compliance of medications and Multiple uncontrolled disease states    Medication List (see cited below): Patient presents with all ordered medications    Current Outpatient Medications   Medication Sig     acetaminophen (TYLENOL) 325 MG tablet Take 2 tablets (650 mg total) by mouth every 4 (four) hours as needed for pain.     aluminum-magnesium hydroxide-simethicone (MAALOX ADVANCED) 200-200-20 mg/5 mL Susp Take 15 mL by mouth 3 (three) times a day with meals.     omeprazole (PRILOSEC) 20 MG capsule Take 1 capsule (20 mg total) by mouth 2 (two) times a day before meals.     ondansetron (ZOFRAN-ODT) 4 MG disintegrating tablet Take 1 tablet (4 mg total) by mouth every 8 (eight) hours as needed for nausea.     sucralfate (CARAFATE) 1 gram tablet TAKE 1 TABLET BY MOUTH FOUR TIMES A DAY..     sucralfate (CARAFATE) 100 mg/mL suspension Take 10 mL (1 g total) by mouth 4 (four) times a day.         Compliance: 100%    Future Appointments   Date Time Provider Department Center   1/10/2020 10:00 AM [facility]N [facility] RN [facility] Clinic   3/13/2020  9:00 AM Tk Jay MD RLN [facility]       Action Plan  RN Will:      Care Guide Will:  Care Guide Delegation      Nursing Notes:   Patient came to see [facility] RN today for MEV accompanied by Tamanna Montoya .       1) Tylenol - no bottle - PRN - need new script     2) Maalox 15ml three times a day with meals - Educated patient to take it 15ml  three times a day with meals. To shake it really well before using it. S    3) Omeprazole 20mg two times a day - but has prescription bottle of 40mg two times a day prescribed by Adilson Lott - States she's out the past 2 weeks - States unable to refill it - was told too soon for refill     4) Dexamethasone 4mg - take (2) tabs take the day before, the day of, and the day after chemo - Doing Chemo every other Tuesday - next 1/14/2020 - 8 tabs left     4) Ondansetron 4m - was told to discontinue but started on Prochlorperazine 10mg (1) tab Q6HPRN for nausea and vomiting     5) Lorazepa, 0.5mg (1) tab Q4H PRN for nausea     6) Mirilax 17g - daily      [facility]   [address]     [facility] RN to call remind patient to take her Dexamethasone on 1/13/2020    [facility] - [phone]    PCA service - SW to submit assessment request     SSI if she could qualify   [facility] worker and in home therapy    Plans:   1) CHW to schedule appt with SW for SSI, PCA, ARMHS worker and in home therapy -please coordinate appt date and time with Oo Seth or his wife to prevent no     2) Patient states no further assist needed for MEV but will call CHW if new needs arise

## 2021-06-05 NOTE — PROGRESS NOTES
Interp: [id number]    Reminder provided about this morning's appointment with the [facility] RN and to bring all medications to the appointment. CHW informed Beh Meh that medical transportation should arrive soon.    Beh Meh was made aware that [facility] was able to secure an  for the appointment.    Addendum: [facility] RN created new goals - PCA, ARM[facility] and In-Home Therapy. Patient wants to wait on applying for RSDI until she has an ARM[facility] worker.      Next Outreach: 2/13/20      Plan:     - Has [facility] scheduled you for a PCA Assessment yet?   - Have you completed your [facility] intake?

## 2021-06-05 NOTE — PROGRESS NOTES
CHW unable to get a [facility] , multiple attempts were made today.    Patient has RN Only goal at this time.    CHW routing to [facility] RN to request if there are any CHW goals.      Next Outreach:   MAKENZIE

## 2021-06-05 NOTE — PROGRESS NOTES
"Hospital Follow-up Visit:    Assessment/Plan:     Beh was seen today for hospital visit follow up.    Hospital discharge follow-up    Malignant neoplasm of stomach, unspecified location (H)    Gastrointestinal hemorrhage, unspecified gastrointestinal hemorrhage type    Anemia due to blood loss, acute    Chronic viral hepatitis B without delta agent and without coma (H)      Stomach pain - refill omeprazole per pt request  Body pain - refill lidocaine/prilocaine cream     Awaiting PCA - assessment started at [facility]    Recommendations per D/C summary   1.) monitor hemoglobin and keep hemoglobin > 7 -- ordered today     Will have her meet with specialty scheduling for these appts:  2.) surgery follow-up concerning resection of cancerous mass   3.) oncology follow-up as scheduled - needs to    Subjective:     Beh Meh is a 40 y.o. female who presents for a hospital discharge follow up.      Hospital/Nursing Home/ Rehab Facility: [facility]  Date of Admission: 1/19/20  Date of Discharge:1/21/20  Reason(s) for Admission:pertinent history of GERD and malignant neoplasm of the stomach who presents for evaluation of abdominal pain, emesis, and back pain.      Principal Problem:    GI bleed  S/p transfusion of PRBC   Per D/C summary:   \"GI bleeding is secondary to gastric cancer is not amenable to endoscopic therapy.\" No plans for EGD. GI recommended Oncology consult. Patient sees [facility].   -oncology consulted and recommended that patient d/c chemotherapy and follow-up as scheduled with Dr. Eh Timmons to discuss surgical resection of the cancerous mass.\"       Do you have any problems taking your medication regularly?  None       Have you had any changes in your medication since discharge? None       Have you had any difficulty following your discharge or treatment plan?  No      Summary of hospitalization:  Hospital discharge summary reviewed  Diagnostic Tests/Treatments reviewed.  Follow up needed: hgb " "  Other Healthcare Providers Involved in Patient's Care: Patient Care Team:  Tk Jay MD as PCP - General (Family Medicine)  Casandra Pereira MD as Assigned PCP  Ciarra Guevara, CASSI as Lead Care Coordinator (Primary Care - CC)  Radha Zimmer CHW as Community Health Worker (Primary Care - CC)  Tk Jay MD as Assigned PCP      Update since discharge: {improved -nausea improved but body pain is still difficult - did not get lidocaine-prilocaine cream   Information from family, SNF, care coordination: n/a     Post Discharge Medication Reconciliation: unable to reconcile discharge medications due to pt did not bring bottles, but reconciled with D/C summary  Plan of care communicated with: patient    mammo 1/7/20 wnl  CT scan 1/19/20 - Motion artifact. There is low-attenuation wall thickening within the body and antrum of the stomach which has progressed. 2.  Previous cholecystectomy. 3.  No appendicitis.     Objective:     Vitals:    01/27/20 0916   BP: 100/60   Pulse: 80   Resp: 12   Temp: 98  F (36.7  C)   TempSrc: Oral   SpO2: 97%   Weight: 151 lb (68.5 kg)   Height: 5' 2.99\" (1.6 m)         Physical Exam:  OBJECTIVE:  Vitals listed above within normal limits  General appearance: well groomed, pleasant, well hydrated, nontoxic appearing  ENT: PERRL, throat clear  Neck: neck supple, no lymphadenopathy, no thyromegaly  Lungs: lungs clear to auscultation bilaterally, no wheezes or rhonchi  Heart: regular rate and rhythm, no murmurs, rubs or gallops  Abdomen: soft, nontender  Neuro: no focal deficits, CN II-XII grossly intact, alert and oriented  Psych:  mood stable, appears to have good insight and judgment          Coding guidelines for this visit:  Type of Medical   Decision Making Face-to-Face Visit       within 7 Days of discharge Face-to-Face Visit        within 14 days of discharge   Moderate Complexity [id number]   High Complexity [id number]       Electronically signed by Afua Fritz MD 01/27/20 " 9:16 AM

## 2021-06-05 NOTE — TELEPHONE ENCOUNTER
I Left a message on [facility] voicemail requesting a call back with appointment details.    Cori TONEY  01.29.2020

## 2021-06-05 NOTE — TELEPHONE ENCOUNTER
Staff called back. States they already scheduled f/u for pt:    Appointment: 02/05/2020  Time: 08:45am  Provider: Eh Timmons MD    [facility]  [address]  [place] [address]  Phone: [phone]

## 2021-06-06 NOTE — PROGRESS NOTES
ASSESMENT AND PLAN:  Diagnoses and all orders for this visit:    Gastric adenocarcinoma (H)  Follow-up with oncology on 3/18/2020.  Discussed risks of COVID 19 and complication due to her low immunity.    Recent major surgery  Pain improving.    Postoperative pain  She will take hydromorphone as needed.  No constipation reported.    Immunization due  -     Pneumococcal polysaccharide vaccine 23-valent 1 yo or older, subq/IM      This transcription uses voice recognition software, which may contain typographical errors.      SUBJECTIVE: Beh Meh is a 40-year-old female with history of gastric adenocarcinoma recently underwent surgery here for follow-up.  She is still having pain but mild.  She has hydromorphone for pain.  No fever since the surgery.  States she is recovering slowly.  She has appointment with oncology next Wednesday, 3/18/2020.  No new concerns since the surgery.    Past Medical History:   Diagnosis Date     Abnormal weight loss      Acute cholecystitis 3/3/2015     Cancer (H) 2019    gastric; had chemo; as of 2/2020 getting eval'd to see if total gastrectomy is an option     Dysphagia      Gastric cancer (H)      GERD (gastroesophageal reflux disease)     known h/o H pylori cleared per 4/13/18 scope; getting scoped 10/2019     Hepatitis B      Hx antineoplastic chemotherapy     for gastric cancer     Patient Active Problem List   Diagnosis     Dyspepsia     Chronic viral hepatitis B without delta agent and without coma (H)     Vitamin D Deficiency     Helicobacter pylori antibody positive     Heartburn     Gastric adenocarcinoma (H)     GI bleed       Allergies:  No Known Allergies    Social History     Tobacco Use   Smoking Status Never Smoker   Smokeless Tobacco Never Used       Review of systems otherwise negative except as listed in HPI.   Social History     Tobacco Use   Smoking Status Never Smoker   Smokeless Tobacco Never Used       OBJECTICE: BP 98/66 (Patient Site: Right Arm, Patient  "Position: Sitting, Cuff Size: Adult Regular)   Pulse 70   Temp 97.6  F (36.4  C) (Oral)   Resp 20   Ht 5' 4\" (1.626 m)   Wt 146 lb 2 oz (66.3 kg)   SpO2 97%   BMI 25.08 kg/m        GEN-alert,  in no apparent distress.  HEENT-mucous membranes are moist, neck is supple.  CV-regular rate and rhythm with no murmur.   RESP-lungs clear to auscultation .  ABDOMEN- Soft , not tender. Incision dry and clean, no redness, mild tenderness.  EXTREM- No edema.  SKIN-no rash, no jaundice      Tk Jay   3/13/2020   "

## 2021-06-06 NOTE — TELEPHONE ENCOUNTER
CC called to say pre op from [facility] in [facility] as did not receive pre op done on Friday.  Checked with Daniela - since part of [facility] should be able to get in Epic.  Faxed to hospital in case.     Successful fax confirmation received.  Thanks.

## 2021-06-06 NOTE — TELEPHONE ENCOUNTER
Patient is was seen today and requested transportation for surgery appointment    March 3, 2020@10 am   [facility]  [address]  [place] [address]  Phone: [phone]    Thank you,  Daniela Howard

## 2021-06-06 NOTE — PROGRESS NOTES
Preoperative Exam    Scheduled Procedure: ESOPHAGOGASTRODUODENOSCOPY, WITH MUCOSAL RESECTION   Surgery Date:  March 3, 2020  Surgery Location: [facility]     Surgeon:  Dr.Jacques Eh Power     Assessment/Plan:     1. Preoperative examination  Normal EKG on 1/19/2020  Negative urine pregnancy test today.  Hemoglobin 10.8 today.    - Hemoglobin  - Basic Metabolic Panel  - Pregnancy, Urine    2. Gastric adenocarcinoma (H)  Stage 2 A. Completed 3 cycles of chemo    3. Iron deficiency anemia, unspecified iron deficiency anemia type  Last hemoglobin 9.0 on 1/28/2020.      Have you had prior anesthesia?: Yes  Have you or any family members had a previous anesthesia reaction:  No  Do you or any family members have a history of a clotting or bleeding disorder?: No      APPROVAL GIVEN to proceed with proposed procedure, without further diagnostic evaluation        Functional Status: Independent  Patient plans to recover at a rehabilitation center.     Subjective:      Beh Meh is a 40 y.o. female who presents for a preoperative consultation.     Has history of gastric adenocarcinoma stage IIa.  Completed 3 cycles of chemo did not tolerate chemo well.  Planned to have above procedure.  Has anemia, hemoglobin ranging from 8-9.  Did not respond to iron therapy.  Nausea and vomiting has improved.      No known history of anesthesia reaction.    All other systems reviewed and are negative, other than those listed in the HPI.    Pertinent History  Do you have difficulty breathing or chest pain after walking up a flight of stairs: Yes: fatigue  History of obstructive sleep apnea: No  Steroid use in the last 6 months: No  Frequent Aspirin/NSAID use: No  Prior Blood Transfusion: Yes: January 19, 2020  Prior Blood Transfusion Reaction: No  If for some reason prior to, during or after the procedure, if it is medically indicated, would you be willing to have a blood transfusion?:  There is no transfusion  refusal.    Current Outpatient Medications   Medication Sig Dispense Refill     dexAMETHasone (DECADRON) 4 MG tablet Take 8 mg by mouth as needed Take two tablets twice daily on the day before, day of, and day after chemo..       lidocaine-prilocaine (EMLA) cream Apply 1 application topically as needed. 30 g 0     omeprazole (PRILOSEC) 20 MG capsule Take 1 capsule (20 mg total) by mouth daily. 30 capsule 2     prochlorperazine (COMPAZINE) 10 MG tablet Take 10 mg by mouth every 6 (six) hours as needed for nausea.       No current facility-administered medications for this visit.         No Known Allergies    Patient Active Problem List   Diagnosis     Dyspepsia     Chronic viral hepatitis B without delta agent and without coma (H)     Vitamin D Deficiency     Helicobacter pylori antibody positive     Heartburn     Malignant neoplasm of stomach, unspecified location (H)     GI bleed       Past Medical History:   Diagnosis Date     Abnormal weight loss      Acute cholecystitis 3/3/2015     Cancer (H) 2019    gastric; had chemo; as of 2/2020 getting eval'd to see if total gastrectomy is an option     Dysphagia      Gastric cancer (H)      GERD (gastroesophageal reflux disease)     known h/o H pylori cleared per 4/13/18 scope; getting scoped 10/2019     Hepatitis B      Hx antineoplastic chemotherapy     for gastric cancer       Past Surgical History:   Procedure Laterality Date     IR PORT PLACEMENT >5 YEARS  11/22/2019     LAPAROSCOPIC CHOLECYSTECTOMY N/A 3/3/2015    Procedure: CHOLECYSTECTOMY LAPAROSCOPIC;  Surgeon: Tigre Navarrete MD;  Location: [facility];  Service:      PORTACATH PLACEMENT         Social History     Socioeconomic History     Marital status:      Spouse name: Not on file     Number of children: Not on file     Years of education: Not on file     Highest education level: Not on file   Occupational History     Not on file   Social Needs     Financial resource strain: Not on file      "Food insecurity:     Worry: Not on file     Inability: Not on file     Transportation needs:     Medical: Not on file     Non-medical: Not on file   Tobacco Use     Smoking status: Never Smoker     Smokeless tobacco: Never Used   Substance and Sexual Activity     Alcohol use: No     Drug use: No     Sexual activity: Yes     Partners: Male   Lifestyle     Physical activity:     Days per week: Not on file     Minutes per session: Not on file     Stress: Not on file   Relationships     Social connections:     Talks on phone: Not on file     Gets together: Not on file     Attends Mosque service: Not on file     Active member of club or organization: Not on file     Attends meetings of clubs or organizations: Not on file     Relationship status: Not on file     Intimate partner violence:     Fear of current or ex partner: Not on file     Emotionally abused: Not on file     Physically abused: Not on file     Forced sexual activity: Not on file   Other Topics Concern     Not on file   Social History Narrative     Not on file       Patient Care Team:  Tk Jay MD as PCP - General (Family Medicine)  Ciarra Guevara, RN as Lead Care Coordinator (Primary Care - CC)  Radha Zimmer CHW as Community Health Worker (Primary Care - CC)  Tk Jay MD as Assigned PCP  Keya Mercado as Oncology Nurse Navigator  Adrian Bonilla MD as Physician (Hematology and Oncology)  Eh Timmons MD as Physician (Cardiovascular and Thoracic Surgery)          Objective:     Vitals:    02/28/20 1308   BP: 90/56   Pulse: 74   Temp: 97.5  F (36.4  C)   TempSrc: Oral   SpO2: 97%   Weight: 156 lb 9 oz (71 kg)   Height: 5' 4\" (1.626 m)         Physical Exam:  Gen - alert, orientated, NAD  Eyes - fundascopic exam limited by the undialated pupil but looks symmetric  ENT - oropharynx clear, TMs clear  Neck - supple, no palpable mass or lymphadenopathy  CV - RRR, no murmur  Resp - lungs CTA  Extrem - warm, no edema  Neuro - CN II-XII intact  Skin - no " rash.  No atypical appearing lesions seen.       There are no Patient Instructions on file for this visit.          Immunization History   Administered Date(s) Administered     Hep B, historic 09/17/2014, 11/13/2014     Influenza, Live, Nasal LAIV3 11/20/2011     Influenza, inj, historic,unspecified 02/18/2011, 11/20/2011, 12/31/2012, 09/17/2014, 11/16/2014, 11/24/2014, 08/31/2015, 12/06/2015, 09/07/2016, 11/06/2016, 09/19/2017     Influenza, seasonal,quad inj 6-35 mos 12/31/2012     Influenza,seasonal quad, PF, =/> 6months 11/24/2013, 10/19/2017, 12/13/2019     Influenza,seasonal, Inj IIV3 12/31/2012     Influenza,seasonal,quad inj =/> 6months 09/19/2017     MMR 03/03/2009, 08/11/2009, 09/17/2014, 11/13/2014     Td, Adult, Absorbed 03/11/2011     Td, adult adsorbed, PF 09/17/2014, 05/05/2016     Td,adult,historic,unspecified 09/17/2014     Tdap 03/03/2009, 08/11/2009, 04/16/2015, 10/19/2017           Electronically signed by Tk Jay MD 02/28/20 1:24 PM

## 2021-06-06 NOTE — PROGRESS NOTES
Assessment/Plan:      Visit for Preoperative Exam.      1. Preoperative examination     2. Malignant neoplasm of stomach, unspecified location (H)           Patient approved for surgery with general or local anesthesia. Copy of the pre-op was given to the patient to bring along on the day of surgery. Proceed with proposed surgery without additional clinical clarifications.     Patient was seen with professional [facility] Della.      Subjective:     Scheduled Procedure: Endoscopic ultrasound, upper GI   Surgery Date:  2/13/2020  Surgery Location:  [facility]  Fax: [phone]  Surgeon:  Jony Dong MD    Gastric cancer.  Has had neoadjuvant chemotherapy.  Anticipating gastrectomy.  Has hepatitis B.  Hb 8.7 on 1/27/20.    Current Outpatient Medications   Medication Sig Dispense Refill     dexAMETHasone (DECADRON) 4 MG tablet Take 8 mg by mouth as needed Take two tablets twice daily on the day before, day of, and day after chemo..       lidocaine-prilocaine (EMLA) cream Apply 1 application topically as needed. 30 g 0     omeprazole (PRILOSEC) 20 MG capsule Take 1 capsule (20 mg total) by mouth daily. 30 capsule 2     prochlorperazine (COMPAZINE) 10 MG tablet Take 10 mg by mouth every 6 (six) hours as needed for nausea.       No current facility-administered medications for this visit.        No Known Allergies    Immunization History   Administered Date(s) Administered     Hep B, historic 09/17/2014, 11/13/2014     Influenza, Live, Nasal LAIV3 11/20/2011     Influenza, inj, historic,unspecified 02/18/2011, 11/20/2011, 12/31/2012, 09/17/2014, 11/16/2014, 11/24/2014, 08/31/2015, 12/06/2015, 09/07/2016, 11/06/2016, 09/19/2017     Influenza, seasonal,quad inj 6-35 mos 12/31/2012     Influenza,seasonal quad, PF, =/> 6months 11/24/2013, 10/19/2017, 12/13/2019     Influenza,seasonal, Inj IIV3 12/31/2012     Influenza,seasonal,quad inj =/> 6months 09/19/2017     MMR 03/03/2009, 08/11/2009, 09/17/2014,  11/13/2014     Td, Adult, Absorbed 03/11/2011     Td, adult adsorbed, PF 09/17/2014, 05/05/2016     Td,adult,historic,unspecified 09/17/2014     Tdap 03/03/2009, 08/11/2009, 04/16/2015, 10/19/2017       Patient Active Problem List   Diagnosis     Dyspepsia     Chronic viral hepatitis B without delta agent and without coma (H)     Vitamin D Deficiency     Helicobacter pylori antibody positive     Heartburn     Malignant neoplasm of stomach, unspecified location (H)     GI bleed       Past Medical History:   Diagnosis Date     Abnormal weight loss      Acute cholecystitis 3/3/2015     Cancer (H)      Dysphagia      Gastric cancer (H)      GERD (gastroesophageal reflux disease)     known h/o H pylori cleared per 4/13/18 scope; getting scoped 10/2019     Hepatitis B      Hx antineoplastic chemotherapy     for gastric cancer       Social History     Socioeconomic History     Marital status:      Spouse name: Not on file     Number of children: Not on file     Years of education: Not on file     Highest education level: Not on file   Occupational History     Not on file   Social Needs     Financial resource strain: Not on file     Food insecurity:     Worry: Not on file     Inability: Not on file     Transportation needs:     Medical: Not on file     Non-medical: Not on file   Tobacco Use     Smoking status: Never Smoker     Smokeless tobacco: Never Used   Substance and Sexual Activity     Alcohol use: No     Drug use: No     Sexual activity: Yes     Partners: Male   Lifestyle     Physical activity:     Days per week: Not on file     Minutes per session: Not on file     Stress: Not on file   Relationships     Social connections:     Talks on phone: Not on file     Gets together: Not on file     Attends Hoahaoism service: Not on file     Active member of club or organization: Not on file     Attends meetings of clubs or organizations: Not on file     Relationship status: Not on file     Intimate partner violence:     " Fear of current or ex partner: Not on file     Emotionally abused: Not on file     Physically abused: Not on file     Forced sexual activity: Not on file   Other Topics Concern     Not on file   Social History Narrative     Not on file       Past Surgical History:   Procedure Laterality Date     IR PORT PLACEMENT >5 YEARS  11/22/2019     LAPAROSCOPIC CHOLECYSTECTOMY N/A 3/3/2015    Procedure: CHOLECYSTECTOMY LAPAROSCOPIC;  Surgeon: Tigre Navarrete MD;  Location: [facility];  Service:      PORTACATH PLACEMENT         History of Present Illness  Recent Health  Fever: no  Chills: no  Fatigue: no  Chest Pain: no  Cough: no  Dyspnea: no  Urinary Frequency: no  Nausea: no  Vomiting: no  Diarrhea: no  Abdominal Pain: no  Easy Bruising: no  Lower Extremity Swelling: no  Poor Exercise Tolerance: yes    Most recent Health Maintenance Visit:  1 month ago    Pertinent History  Prior Anesthesia: yes  Previous Anesthesia Reaction:  no  Diabetes: no  Cardiovascular Disease: no  Pulmonary Disease: no  Renal Disease: no  GI Disease: yes  Sleep Apnea: no  Thromboembolic Problems: no  Clotting Disorder: no  Bleeding Disorder: no  Transfusion Reaction: yes  Impaired Immunity: no  Steroid use in the last 6 months: no,   Frequent Aspirin use: no    Family history of None    Social history of None    After surgery, the patient plans to recover at home with family.    Review of Systems  Review of Systems   Constitutional: Negative.    HENT: Negative.    Eyes: Negative.    Respiratory: Negative.    Cardiovascular: Negative.              Objective:         Vitals:    02/12/20 1138   BP: 94/64   Pulse: 82   Resp: 20   Temp: 98.1  F (36.7  C)   TempSrc: Oral   SpO2: 98%   Weight: 155 lb 12 oz (70.6 kg)   Height: 5' 4.17\" (1.63 m)       Physical Exam:  Physical Exam     Eyes: EOM full, pupils normal, conjunctivae normal  Ears: TM's and canals normal  Oropharynx: normal  Neck: supple without adenopathy or thyromegaly  Lungs: " normal  Heart: regular rhythm, normal rate, no murmur  Abdomen: no HSM, mass or tenderness  Extremities: FROM, normal strength and sensation

## 2021-06-06 NOTE — TELEPHONE ENCOUNTER
Patient scheduled for surgery tomorrow at [facility], need pre-op note faxed to [phone]. Faxed as requested.

## 2021-06-06 NOTE — TELEPHONE ENCOUNTER
Called to [employer] to schedule transportation for pt . Transportation will be with [facility] confirmation number  [id number]

## 2021-06-06 NOTE — PROGRESS NOTES
No outreach completed to [facility] today as she was having a procedure completed today.    Ana from [facility] reports she will be meet with Beh [facility] in the next 2 weeks regarding the [facility] referral, estimated Tuesday 2/25/20.    [facility] is not sure why Beh Meh is not in their system for a PCA Assessment since the CHW submitted the request online on 1/10/2020. They accepted Beh [facility]'s information and will follow up with her and the CHW regarding the referral.       Next Outreach: 3/12/2020    Plan:     - Reminder about Friday 3/13/20 9am PCP appointment.   - [facility] worker assigned?   - PCA Assessment scheduled or completed?

## 2021-06-06 NOTE — PROGRESS NOTES
Clinic Care Coordination Contact    Follow Up Progress Note        Goals addressed this encounter:     Goals        Patient Stated      Mental Health (pt-stated)      Goal Statement: I want to establish with an [facility] worker and in-home therapy in the next 3 months.    Date Goal set: 01/10/2020  Barriers: Language  Strengths: family support  Date to Achieve By: 4/10/2020  Patient expressed understanding of goal: Yes    Action steps to achieve this goal  1.  I refused to get into my medical transportation vehicle and attend my initial [facility] intake at [facility] and therefor I will re-schedule my initial intake in the next 4 weeks.            Other (pt-stated)      Goal Statement: I would like to find out if I qualify for PCA services in the next 90 days.    Date Goal set: 01/10/2020  Barriers: Language  Strengths: family support  Date to Achieve By: 4/10/2020  Patient expressed understanding of goal: Yes    Action steps to achieve this goal  1. My  is Alison and her number is [phone] and I will answer her phone call in the next 2 business days.  2. I will be present for my PCA Assessment.                     Intervention/Education provided during outreach:     Beh Meh is home from the hospital after having surgery and her daughter will answer the phone when the PCA  calls her. No update on [facility], but Ana had previously reports issues with assigning [facility] workers to [facility] patients due to finding interpreters. CHW left a message for Ana.     Addendum: Refused to go to initial [facility] intake appointment and will need to re-schedule per Ana she will try and get patient to come to [facility] for an intake in the next 4 weeks.       Plan:      - Beh Meh will be present for her PCA Assessment which should occur in the next 2 weeks.   - Beh Meh will answer her phone and speak with [facility] regarding getting assigned an [facility] worker.      Care Coordinator will follow up on  4/13/2020

## 2021-06-07 NOTE — TELEPHONE ENCOUNTER
Refill Approved    Rx renewed per Medication Renewal Policy. Medication was last renewed on 1/27/20.    Laura Rodriguez, [facility] Triage/Med Refill 3/27/2020     Requested Prescriptions   Pending Prescriptions Disp Refills     omeprazole (PRILOSEC) 20 MG capsule [Pharmacy Med Name: OMEPRAZOLE DR 20 MG CAPSULE] 30 capsule 0     Sig: TAKE 1 CAPSULE BY MOUTH DAILY       GI Medications Refill Protocol Passed - 3/26/2020  1:49 PM        Passed - PCP or prescribing provider visit in last 12 or next 3 months.     Last office visit with prescriber/PCP: 1/27/2020 Afua Fritz MD OR same dept: 3/13/2020 Tk Jay MD OR same specialty: 3/13/2020 Tk Jay MD  Last physical: Visit date not found Last MTM visit: Visit date not found   Next visit within 3 mo: Visit date not found  Next physical within 3 mo: Visit date not found  Prescriber OR PCP: Afua Fritz MD  Last diagnosis associated with med order: 1. Hospital discharge follow-up  - omeprazole (PRILOSEC) 20 MG capsule [Pharmacy Med Name: OMEPRAZOLE DR 20 MG CAPSULE]; TAKE 1 CAPSULE BY MOUTH DAILY  Dispense: 30 capsule; Refill: 0    If protocol passes may refill for 12 months if within 3 months of last provider visit (or a total of 15 months).

## 2021-06-08 NOTE — PROGRESS NOTES
Clinic Care Coordination Contact    Community Health Worker Follow Up    Goals:     Goals Addressed                 This Visit's Progress       Patient Stated      Financial Wellbeing (pt-stated)   On track     Goal Statement: I want to apply for SSI/RSDI in the next 60 days.    Date Goal set: 4/13/2020  Barriers: Language  Strengths: [facility] support  Date to Achieve By: 6/13/2020  Patient expressed understanding of goal: Yes    Action steps to achieve this goal:  1. I submitted my SSI/RSDI application last Tuesday 5/12 and I will continue to answer my phone and monitor my mail for additional forms that come from [facility].  2. I will ask my [facility] worker for support with reviewing forms and paperwork during our telehealth appointments as I can take pictures of documents and send them to her as needed.                CHW Next Follow-up: 6/19/2020    CHW Plan: Continue outreach    Spoke with Dimitri Ann, no new concerns, and Beh Meh continues to work with her [facility] worker Elizabeth.     Beh Meh will have additional forms and documentation to submit for her SSI/RSDI application which [employer] will continue to help her with.

## 2021-06-09 NOTE — PROGRESS NOTES
[facility] RN approved patient to step down to Maintenance.    CHW Outreach: 6/19/2020    CHW Plan: Call in 2 months and review for graduation

## 2021-06-09 NOTE — PRE-PROCEDURE
Procedure Name: Right port remove with sedation   Date/Time: 7/20/2020 8:42 AM  Written consent obtained?: Yes  Risks and benefits: Risks, benefits and alternatives were discussed  Consent given by: patient  Expected level of sedation: moderate  ASA Class: Class 3- Severe systemic disease, definite functional limitations  Mallampati: Grade 2- soft palate, base of uvula, tonsillar pillars, and portion of posterior pharyngeal wall visible  Patient states understanding of procedure being performed: Yes  Patient's understanding of procedure matches consent: Yes  Procedure consent matches procedure scheduled: Yes  Appropriately NPO: yes  Lungs: lungs clear with good breath sounds bilaterally  Heart: normal heart sounds and rate  History & Physical reviewed: History and physical reviewed and no updates needed  Statement of review: I have reviewed the lab findings, diagnostic data, medications, and the plan for sedation

## 2021-06-09 NOTE — PROGRESS NOTES
Clinic Care Coordination Contact    Community Health Worker Follow Up    Goals:     Goals Addressed                 This Visit's Progress       Patient Stated      COMPLETED: Financial Wellbeing (pt-stated)   On track     Goal Statement: I want to apply for SSI/RSDI in the next 60 days.    Date Goal set: 4/13/2020  Barriers: Language  Strengths: [facility] support  Date to Achieve By: 6/13/2020  Patient expressed understanding of goal: Yes    Personal Plan:  1. I submitted my SSI/RSDI application on Tuesday 5/12/2020 with the support of [employer].                CHW Next Follow-up: TBD    CHW Plan: Routing to [facility]SI [place] to review for maintenance.    Beh Meh has completed her goal of applying for RSDI/SSI with the support of [employer]. At this time she will continue to answer her phone with the support of her family and Pending sale to [facility] worker to follow up on her RSDI/SSI application. If approved for maintenance the CHW can follow up with Beh Meh and [employer] in 2 months to determine if there are any updates.

## 2021-06-10 ENCOUNTER — COMMUNICATION - HEALTHEAST (OUTPATIENT)
Dept: FAMILY MEDICINE | Facility: CLINIC | Age: 41
End: 2021-06-10

## 2021-06-10 NOTE — PROGRESS NOTES
Clinic Care Coordination Contact     Patient has completed all goals with Clinic Care Coordination.     Please review the chart and confirm if Graduation is approved.     - Beh [facility] was approved for social security and the CHW did confirm this with [employer].

## 2021-06-11 NOTE — PROGRESS NOTES
ASSESMENT AND PLAN:  Diagnoses and all orders for this visit:    Gastric adenocarcinoma (H)  Closely followed by GI.  We will try to get last office visit note.    Midline low back pain without sciatica, unspecified chronicity  She will take Dilaudid as needed.    Chronic viral hepatitis B without delta agent and without coma (H)  Recheck labs at next visit.  Not on medication.  Referral to GI again if indicated.    Immunization due  -     Influenza, Seasonal Quad, PF =/> 6months      This transcription uses voice recognition software, which may contain typographical errors.      SUBJECTIVE: Beh Meh is a 40-year-old female with history of gastric adenocarcinoma completed 2 cycles of chemo and distal gastrectomy.  She has been closely followed by oncology.  Last visit with oncology was a week ago, no note available to review yet.  Last note available to review was from July.  The plan is to have repeat CT in 3 months at that time.  She said she is improving slowly.  Quality of life is improving.  Appetite is also improving.  She is complaining of lower back pain, burning sensation.  She has Dilaudid for pain, has been taking it only as needed for severe pain.  She has been having trouble sleeping at night due to the pain lately.  She is not taking Dilaudid daily, states she does not need refill.  She has 2 different stool softeners for constipation.  She gained a few pounds since last visit.  No new concerns or complaints.    No known exposure to COVID-19.  No acute fever, sore throat, cough, chest pain or shortness of breath.    Past Medical History:   Diagnosis Date     Abnormal weight loss      Acute cholecystitis 3/3/2015     Cancer (H) 2019    gastric; had chemo; as of 2/2020 getting eval'd to see if total gastrectomy is an option     Dysphagia      Gastric cancer (H)      GERD (gastroesophageal reflux disease)     known h/o H pylori cleared per 4/13/18 scope; getting scoped 10/2019     Hepatitis B      Hx  "antineoplastic chemotherapy     for gastric cancer     Patient Active Problem List   Diagnosis     Dyspepsia     Chronic viral hepatitis B without delta agent and without coma (H)     Vitamin D Deficiency     Helicobacter pylori antibody positive     Heartburn     Gastric adenocarcinoma (H)     GI bleed       Allergies:  No Known Allergies    Social History     Tobacco Use   Smoking Status Never Smoker   Smokeless Tobacco Never Used       Review of systems otherwise negative except as listed in HPI.   Social History     Tobacco Use   Smoking Status Never Smoker   Smokeless Tobacco Never Used       OBJECTICE: BP 92/68 (Patient Site: Left Arm, Patient Position: Sitting, Cuff Size: Adult Regular)   Pulse 70   Temp 98  F (36.7  C) (Oral)   Ht 5' 4\" (1.626 m)   Wt 147 lb 5 oz (66.8 kg)   LMP 05/24/2020 (Within Months)   SpO2 96%   BMI 25.29 kg/m          GEN-alert,  in no apparent distress.  HEENT-mucous membranes are moist, neck is supple.  CV-regular rate and rhythm with no murmur.   RESP-lungs clear to auscultation .  ABDOMEN- Soft , not tender.  BACK- No significant tenderness. Negative SLR test.   SKIN-normal        Hensley Za   9/15/2020     "

## 2021-06-12 NOTE — TELEPHONE ENCOUNTER
Forms Request  Name of form/paperwork: Other:  Request for Medical Opinion  Have you been seen for this request: Yes:  09/15/2020  Do we have the form: Yes- In blue folder  When is form needed by: ASAP  How would you like the form returned:   Patient Notified form requests are processed in 3-5 business days: Yes    Okay to leave a detailed message? Yes

## 2021-06-12 NOTE — TELEPHONE ENCOUNTER
Form completed by . Copy was sent to medical records and original was left the  for patient to pick-up. Patient was notified.

## 2021-06-13 NOTE — PROGRESS NOTES
Subjective:   Beh Meh presents with an  today.  She comes in complaining of neck pain.  The neck pain seems to be worse in the morning.  The pain will shoot down into both shoulders as well as up over the top of the scalp.  She denies any neck injuries.  She has had symptoms off and on over the last year.  She has had no arm pain or weakness.  Denies numbness in her extremities.  There is been no neurologic deficits such as double vision or weakness of an arm or leg.  She also has been quite tired.  She is concerned about this.  She thinks she sleeps pretty well.  She denies polyuria or polydipsia but would like to be screened for diabetes as there is some family history of this.  She denies any blood loss or black stools of any type.  Appetite seems good.  She has no nausea or vomiting.  She has had no fevers of any type.      Objective:  HEENT: Pupils equally round and reactive to light.  Conjunctivae are clear.  Pharynx clear.  Both TMs are gray.  Neck: No lymphadenopathy noted anteriorly.  No thyromegaly noted.  There is posterior tenderness noted in both cervical muscles as well as both occiput areas.  The trapezius areas are tight and spasmed bilaterally.  Rhomboid area is minimally tender today.    Lungs: Lungs today are clear.  Patient's in no respiratory distress.  Cardiac: There is a regular rhythm present.  No murmur heard.  Extremities: Motor strength appears normal.  Deep tendon reflexes symmetric.  No edema noted in the feet.    Assessment:  1.  Neck pain associated with headaches most consistent with muscle tension type headaches.  Rule out cervical strain.  Doubt arthritis.  2.  Fatigue.  Questionable etiology.  Rule out metabolic causes.  Rule out depression.      Plan:  Patient will start cyclobenzaprine 5 mg up to 3 times daily for her neck pain and spasm.  She was instructed in side effects of this.  She will use ice therapy to the neck.  She can stretch the neck and use massage as  well.  Tylenol can be used also for pain control.  She will limit NSAID use due to her gastritis symptoms.  She will be called with any lab abnormalities.

## 2021-06-15 NOTE — PROGRESS NOTES
"ASSESMENT AND PLAN:  Diagnoses and all orders for this visit:    Memory difficulties  -     Ambulatory referral to Psychology  For complete neuropsych testing and citizenship waiver.    Heartburn  She will start taking medication and follow-up in 4 weeks.      SUBJECTIVE: Beh Meh is here requesting a referral for neuropsych evaluation.  She has significant memory problems.  States she cannot learn and maintain new inflammations.  She never learned to read and write in her old language.  She does not think she will be able to learn/study for citizenship exam.    She saw 1 of my partners here today for heartburn symptoms.  She was started on ranitidine.  Patient has not picked up the medication yet.  She had upper GI done in 2015, found to have atrophic gastritis and recommended to follow-up in 1 year.  She does not have follow-up appointment scheduled.  Still having heartburn symptoms almost daily, not related to food.  No nausea or vomiting.  No diarrhea or blood in the stool.    Past Medical History:   Diagnosis Date     Acute cholecystitis 3/3/2015     GERD (gastroesophageal reflux disease)      Hepatitis B      Patient Active Problem List   Diagnosis     Dyspepsia     Hepatitis, B Virus     Vitamin D Deficiency     Helicobacter pylori antibody positive     Heartburn       Allergies:  No Known Allergies    History   Smoking Status     Never Smoker   Smokeless Tobacco     Never Used       Review of systems otherwise negative except as listed in HPI.   History   Smoking Status     Never Smoker   Smokeless Tobacco     Never Used       OBJECTICE: /78 (Patient Site: Left Arm, Patient Position: Sitting, Cuff Size: Adult Regular)  Pulse 72  Temp 97.7  F (36.5  C) (Oral)   Resp 16  Ht 5' 4\" (1.626 m)  Wt 170 lb 5 oz (77.3 kg)  LMP 02/04/2018  BMI 29.23 kg/m2    DATA REVIEWED:        GEN-alert,  in no apparent distress.  HEENT-mucous membranes are moist, neck is supple.  CV-regular rate and rhythm with no " Delivery Date:7/12/2020  Delivery Type:vaginal  Delivered By: Dr Rosa  Baby's Name:[place]   Weight:9lb 6.6oz     Breastfeeding:yes      Bottle:denies  PP Depression:denies  Bowel Habits:normal  Bladder Function:normal  Birth Control: vasectomy  [place] since delivery:denies  Menses:denies  Problems:denies  Last pap:12/10/19 regardless negative     murmur.   RESP-lungs clear to auscultation .  ABDOMEN- Soft , mild epigastric tenderness.        This transcription uses voice recognition software, which may contain typographical errors.      Tk Jay   2/7/2018

## 2021-06-15 NOTE — PROGRESS NOTES
Assessment: /    Plan:    1. Gastroesophageal reflux disease without esophagitis  ranitidine (ZANTAC) 150 MG tablet       Begin ranitidine.    She will make an appointment with her dentist.    Patient agrees to have an appointment with Dr. Jay, in order to consider whether psychology referral would be beneficial.  I am not familiar with the patient's situation.    Patient was seen with professional Kerwin , Wilfrido Ann.      Subjective:    HPI:  Beh Meh is a 38-year-old female presenting with epigastric burning.  She ran out of omeprazole one year ago.  She was previously treated for H. Pylori.    She also notes that she has a sore tooth, a right upper molar.    She is also wondering about the possibility of a psychology referral for citizenship waiver.          Review of Systems: No vomiting or melena.  No fever or cough.      Current Outpatient Prescriptions   Medication Sig Dispense Refill     cholecalciferol, vitamin D3, (D3-2000) 2,000 unit cap TAKE 1 CAPSULE DAILY. 90 each 3     cyclobenzaprine (FLEXERIL) 5 MG tablet Take 1 tablet (5 mg total) by mouth 3 (three) times a day as needed for muscle spasms. 21 tablet 0     SPRINTEC, 28, 0.25-35 mg-mcg per tablet TAKE ONE TABLET BY MOUTH DAILY 28 tablet 0     ranitidine (ZANTAC) 150 MG tablet Take 1 tablet (150 mg total) by mouth 2 (two) times a day. 60 tablet 11     No current facility-administered medications for this visit.          Objective:    Vitals:    02/06/18 1433   BP: 114/74   Pulse: 86   Resp: 17   Temp: 98.4  F (36.9  C)   SpO2: 98%       Gen:  NAD, VSS  Teeth appear normal  Lungs:  normal  Heart:  normal  Abdomen:  No HSM, mass or tenderness        ADDITIONAL HISTORY SUMMARIZED (2): None.  DECISION TO OBTAIN EXTRA INFORMATION (1): None.   RADIOLOGY TESTS (1): None.  LABS (1): None.  MEDICINE TESTS (1): None.  INDEPENDENT REVIEW (2 each): None.     Total Data Points: 0

## 2021-06-16 NOTE — TELEPHONE ENCOUNTER
Telephone Encounter by Chela Johansen at 10/29/2019  4:29 PM     Author: Chela Johansen Service: -- Author Type: --    Filed: 10/29/2019  4:29 PM Encounter Date: 10/24/2019 Status: Signed    : Chela Johansen APPROVED:    Approval start date:7/28/19  Approval end date:11/28/2019    Pharmacy has been notified of approval and will contact patient when medication is ready for pickup.

## 2021-06-17 NOTE — PROGRESS NOTES
"ASSESMENT AND PLAN:  Diagnoses and all orders for this visit:    Epigastric abdominal pain  -     Ambulatory referral for Upper GI Endoscopy  -     omeprazole (PRILOSEC) 20 MG capsule; Take 1 capsule (20 mg total) by mouth daily.  Dispense: 90 capsule; Refill: 3  Worsening symptom with ranitidine.  We will try omeprazole.  Scheduled for upper GI.  Follow-up after the procedure.      SUBJECTIVE: Beh Meh is here to follow-up on heartburn symptoms.  She started ranitidine after her last visit here about 5 weeks ago.  States the medication makes the symptoms worse and she stopped taking it 3 weeks ago.  She is feeling better after stopping the medication but still having epigastric pain/burning sensation especially at night.  The pain radiates to her mid back.  No nausea or vomiting.  Appetite is normal.  No diarrhea, constipation or blood in the stool.  She had upper GI done in 2015, found it atrophic gastritis.  She was recommended repeat upper GI in 1 year.    Past Medical History:   Diagnosis Date     Acute cholecystitis 3/3/2015     GERD (gastroesophageal reflux disease)      Hepatitis B      Patient Active Problem List   Diagnosis     Dyspepsia     Hepatitis, B Virus     Vitamin D Deficiency     Helicobacter pylori antibody positive     Heartburn       Allergies:  No Known Allergies    History   Smoking Status     Never Smoker   Smokeless Tobacco     Never Used       Review of systems otherwise negative except as listed in HPI.   History   Smoking Status     Never Smoker   Smokeless Tobacco     Never Used       OBJECTICE: /78  Pulse (!) 16  Temp 97.7  F (36.5  C) (Oral)   Resp 16  Ht 5' 4\" (1.626 m)  Wt 170 lb 1 oz (77.1 kg)  LMP 03/20/2018 (Approximate)  SpO2 97%  BMI 29.19 kg/m2          GEN-alert,  in no apparent distress.  HEENT-mucous membranes are moist, neck is supple.  CV-regular rate and rhythm with no murmur.   RESP-lungs clear to auscultation .  ABDOMEN- Soft , epigastric tenderness. No " palpable masses.   SKIN-normal    This transcription uses voice recognition software, which may contain typographical errors.      Tk Jay   3/30/2018

## 2021-06-18 NOTE — PROGRESS NOTES
"ASSESMENT AND PLAN:  Diagnoses and all orders for this visit:    Heartburn  Improving.  Continue omeprazole for now, f/u as needed.   Due for physical in 12/18.      SUBJECTIVE: Beh Meh is here to follow-up on heartburn symptoms.  He had worsening symptom with ranitidine and was started on omeprazole.  States the symptom is improving with omeprazole, only having occasional symptoms.  No nausea or vomiting.  Upper GI done on 4/13/2018, benign findings with negative H. Pylori.  No new complaints since last visit.     Past Medical History:   Diagnosis Date     Acute cholecystitis 3/3/2015     GERD (gastroesophageal reflux disease)      Hepatitis B      Patient Active Problem List   Diagnosis     Dyspepsia     Hepatitis, B Virus     Vitamin D Deficiency     Helicobacter pylori antibody positive     Heartburn       Allergies:  No Known Allergies    History   Smoking Status     Never Smoker   Smokeless Tobacco     Never Used       Review of systems otherwise negative except as listed in HPI.   History   Smoking Status     Never Smoker   Smokeless Tobacco     Never Used       OBJECTICE: BP 96/68 (Patient Site: Left Arm, Patient Position: Sitting, Cuff Size: Adult Regular)  Pulse 72  Temp 97.8  F (36.6  C) (Oral)   Ht 5' 4\" (1.626 m)  Wt 173 lb 8 oz (78.7 kg)  LMP 05/25/2018 (Exact Date)  BMI 29.78 kg/m2        GEN-alert,  in no apparent distress.  HEENT-mucous membranes are moist, neck is supple.  CV-regular rate and rhythm with no murmur.   RESP-lungs clear to auscultation .  ABDOMEN- Soft , Mild epigastric tenderness.   SKIN-normal    This transcription uses voice recognition software, which may contain typographical errors.        Tk Jay   5/30/2018     "

## 2021-06-19 NOTE — LETTER
Letter by Milagros Price NP at      Author: Milagros Price NP Service: -- Author Type: --    Filed:  Encounter Date: 11/22/2019 Status: Signed         November 22, 2019     Patient: Beh Meh   YOB: 1980   Date of Visit: 11/22/2019       To Whom It May Concern:    Please excuse Beh Meh from work today 11/22/19 as she has undergone a procedure.  She may return to work without restriction anytime after today 11/22/2019.      Sincerely,        Electronically signed by Milagros Prcie NP

## 2021-06-20 NOTE — LETTER
Letter by Afua Fritz MD at      Author: Afua Fritz MD Service: -- Author Type: --    Filed:  Encounter Date: 1/27/2020 Status: (Other)                    My Depression Action Plan  Name: Beh Meh   Date of Birth 1980  Date: 1/27/2020    My Doctor: Tk Jay MD   My Clinic: [facility]   [address] [place] [id number]  [phone]          GREEN    ZONE   Good Control    What it looks like:     Things are going generally well. You have normal ups and downs. You may even feel depressed from time to time, but bad moods usually last less than a day.   What you need to do:  1. Continue to care for yourself (see self care plan)  2. Check your depression survival kit and update it as needed  3. Follow your physicians recommendations including any medication.  4. Do not stop taking medication unless you consult with your physician first.           YELLOW         ZONE Getting Worse    What it looks like:     Depression is starting to interfere with your life.     It may be hard to get out of bed; you may be starting to isolate yourself from others.    Symptoms of depression are starting to last most all day and this has happened for several days.     You may have suicidal thoughts but they are not constant.   What you need to do:     1. Call your care team. Your response to treatment will improve if you keep your care team informed of your progress. Yellow periods are signs an adjustment may need to be made.     2. Continue your self-care.  Just get dressed and ready for the day.  Don't give yourself time to talk yourself out of it.    3. Talk to someone in your support network.    4. Open up your depression Depression Self-Care Plan / Wellness kit.           RED    ZONE Medical Alert - Get Help    What it looks like:     Depression is seriously interfering with your life.     You may experience these or other symptoms: You cant get out of bed most days, cant work or  engage in other necessary activities, you have trouble taking care of basic hygiene, or basic responsibilities, thoughts of suicide or death that will not go away, self-injurious behavior.     What you need to do:  1. Call your care team and request a same-day appointment. If they are not available (weekends or after hours) call your local crisis line, emergency room or 911.            Self-Care Plan / Wellness Kit    Self-Care for Depression  Heres the deal. Your body and mind are really not as separate as most people think.  What you do and think affects how you feel and how you feel influences what you do and think. This means if you do things that people who feel good do, it will help you feel better.  Sometimes this is all it takes.  There is also a place for medication and therapy depending on how severe your depression is, so be sure to consult with your medical provider and/ or Behavioral Health Consultant if your symptoms are worsening or not improving.     In order to better manage my stress, I will:    Exercise  Get some form of exercise, every day. This will help reduce pain and release endorphins, the feel good chemicals in your brain. This is almost as good as taking antidepressants!  This is not the same as joining a gym and then never going! (they count on that by the way?) It can be as simple as just going for a walk or doing some gardening, anything that will get you moving.      Hygiene   Maintain good hygiene (get out of bed in the morning, make your bed, brush your teeth, take a shower, and get dressed like you were going to work, even if you are unemployed).  If your clothes don't fit try to get ones that do.    Diet  Strive to eat foods that are good for me, drink plenty of water, and avoid excessive sugar, caffeine, alcohol, and other mood-altering substances.  Some foods that are helpful in depression are: complex carbohydrates, B vitamins, flaxseed, fish or fish oil, fresh fruits and  vegetables.    Psychotherapy  Agree to participate in Individual Therapy (if recommended).    Medication  If prescribed medications, I agree to take them.  Missing doses can result in serious side effects.  I understand that drinking alcohol, or other illicit drug use, may cause potential side effects.  I will not stop my medication abruptly without first discussing it with my provider.    Staying Connected With Others  Stay in touch with my friends, family members, and my primary care provider/team.    Use your imagination  Be creative.  We all have a creative side; it doesnt matter if its oil painting, sand castles, or mud pies! This will also kick up the endorphins.    Witness Beauty  (AKA stop and smell the roses) Take a look outside, even in mid-winter. Notice colors, textures. Watch the squirrels and birds.     Service to others  Be of service to others.  There is always someone else in need.  By helping others we can get out of ourselves and remember the really important things.  This also provides opportunities for practicing all the other parts of the program.    Humor  Laugh and be silly!  Adjust your TV habits for less news and crime-drama and more comedy.    Control your stress  Try breathing deep, massage therapy, biofeedback, and meditation. Find time to relax each day.     Crisis Text Line  http://www.crisistextline.org    The Crisis Text Line serves anyone, in any type of crisis, providing access to free, 24/7 support and information via the medium people already use and trust:    Here's how it works:  1.  Text 335-141 from anywhere in the [place], anytime, about any type of crisis.  2.  A live, trained Crisis Counselor receives the text and responds quickly.  3.  The volunteer Crisis Counselor will help you move from a 'hot moment to a cool moment'.  My support system    Clinic Contact:  Phone number:    Contact 1:  Phone number:    Contact 2:  Phone number:    [facility]/:  Phone  number:    Therapist:  Phone number:    [facility] crisis center:    Phone number:    Other community support:  Phone number:

## 2021-06-22 NOTE — PROGRESS NOTES
Assessment:Plan   1. Routine history and physical examination of adult  Declined pap    2. Blurred vision  - Ambulatory referral to Optometry    3. Right shoulder pain, unspecified chronicity  Instructed to avoid ibuprofen.  Prescription for Tylenol sent.  Will consider physical therapy.    4. Atrophic gastritis without hemorrhage  Upper GI done in 4/18.  Negative H. pylori.  Atrophic gastritis, recommended PPI.  Will increase omeprazole to 20 mg twice daily.  She will use sucralfate as needed for severe symptom.    5. Screening for lipid disorders  - [facility]    Subjective:      Beh Meh is a 38 y.o. female who presents for an annual exam. The patient is sexually active. The patient participates in regular exercise: no. The patient reports that there is not domestic violence in her life.   Menstrual periods are regular, not on any birth control methods.  Does not want to be on birth control.    Epigastric pain: Still having burning pain almost every day.  She takes omeprazole 20 mg daily.  She was seen in the ED in October for worsening symptoms, was given sucralfate.  States sucralfate to relieve symptoms immediately.  She had EGD done in 4/18.  H. pylori was negative.  Showed atrophic gastritis.  She has severe symptoms with nausea at least once a month for the past 3 months.  No vomiting blood.  No diarrhea or constipation.  No blood in the stool.    Right shoulder pain: Complaining of right shoulder pain especially after long day at work.  She works from 2 PM to 11 PM 5 days a week.  She is right-handed.  No heavy lifting involved at work but needs repeated movement.  She has not tried any medication.  No radiation to the pain.  No tingling numbness sensation in the right upper extremities.    She is requesting referral for eye exam.  Having blurred vision for the past few months.  No double vision.  No floaters.  No pain in the eyes.    Healthy Habits:   Regular Exercise: No  Sunscreen Use: No  Healthy  Diet: Yes  Dental Visits Regularly: No  Seat Belt: Yes  Sexually active: Yes  Self Breast Exam Monthly:No  Hemoccults: No  Flex Sig: No  Colonoscopy: No  Lipid Profile: Yes  Glucose Screen: Yes  Prevention of Osteoporosis: No  Last Dexa: No  Guns at Home:  No      Immunization History   Administered Date(s) Administered     Hep B, historic 09/17/2014, 11/13/2014     Influenza, inj, historic,unspecified 11/20/2011, 12/31/2012, 11/16/2014, 11/24/2014     Influenza, seasonal,quad inj 36+ mos 09/19/2017     Influenza, seasonal,quad inj 6-35 mos 12/31/2012     MMR 09/17/2014, 11/13/2014     Td,adult,historic,unspecified 09/17/2014     Tdap 04/16/2015     Immunization status: up to date and documented.    No exam data present    Gynecologic History  Patient's last menstrual period was 11/25/2018 (exact date).  Contraception: none  Last Pap: 12/2/2013.   Last mammogram: N/A. Results were: N/A      OB History   No data available       Current Outpatient Medications   Medication Sig Dispense Refill     omeprazole (PRILOSEC) 20 MG capsule Take 1 capsule (20 mg total) by mouth daily. 90 capsule 3     sucralfate (CARAFATE) 1 gram tablet TAKE 1 TABLET BY MOUTH FOUR TIMES A DAY. 40 tablet 0     sucralfate (CARAFATE) 100 mg/mL suspension Take 10 mL (1 g total) by mouth 4 (four) times a day. 414 mL 0     No current facility-administered medications for this visit.      Past Medical History:   Diagnosis Date     Acute cholecystitis 3/3/2015     GERD (gastroesophageal reflux disease)      Hepatitis B      Past Surgical History:   Procedure Laterality Date     LAPAROSCOPIC CHOLECYSTECTOMY N/A 3/3/2015    Procedure: CHOLECYSTECTOMY LAPAROSCOPIC;  Surgeon: Tigre Navarrete MD;  Location: [facility];  Service:      Patient has no known allergies.  No family history on file.  Social History     Socioeconomic History     Marital status:      Spouse name: Not on file     Number of children: Not on file     Years of  "education: Not on file     Highest education level: Not on file   Social Needs     Financial resource strain: Not on file     Food insecurity - worry: Not on file     Food insecurity - inability: Not on file     Transportation needs - medical: Not on file     Transportation needs - non-medical: Not on file   Occupational History     Not on file   Tobacco Use     Smoking status: Never Smoker     Smokeless tobacco: Never Used   Substance and Sexual Activity     Alcohol use: No     Drug use: No     Sexual activity: Yes     Partners: Male   Other Topics Concern     Not on file   Social History Narrative     Not on file       Review of Systems  Review of Systems      No abnormal vaginal bleeding.  No vaginal discharge.  No depression symptoms.  No acute fever or URI symptoms.  No acute chest pain shortness of breath or palpitations.    Objective:         Vitals:    12/05/18 0910   BP: 108/78   Pulse: 60   Temp: 97.8  F (36.6  C)   TempSrc: Oral   Weight: 165 lb 7 oz (75 kg)   Height: 5' 4\" (1.626 m)     Body mass index is 28.4 kg/m .    Physical  Physical Exam     Gen - alert, orientated, NAD  Eyes - fundascopic exam limited by the undialated pupil but looks symmetric  ENT - oropharynx clear, TMs clear  Neck - supple, no palpable mass or lymphadenopathy  CV - RRR, no murmur  Resp - lungs CTA  Ab - soft, epigastric tenderness, no rebound or guarding, no palpable mass or organomegaly   - Declined, no concerns.   Extrem - warm, no edema  Neuro - CN II-XII intact, strength, sensation, reflexes intact and symmetric  Skin - no rash, no atypical appearing lesions seen.   "

## 2021-06-25 NOTE — PROGRESS NOTES
FEMALE PREVENTATIVE EXAM    Assessment and Plan:     1. Routine general medical examination at a health care facility    2. Screening for cervical cancer  - Gynecologic Cytology (PAP Smear)    3. Breast tenderness in female  Last mammogram in 1 she is not taking any medications currently.  /21, normal.   - Mammo Diagnostic Bilateral  - US Breast Bilateral Limited 1-3 Quadrants    4. Gastric adenocarcinoma (H)  S/P distal gastrectomy, chemo and radiation.  In remission.  Last seen by oncology on 5/4/2021.  Follow-up appointment scheduled on 7/8/2021 per patient.    Next follow up:  No follow-ups on file.    Immunization Review  Adult Imm Review: No immunizations due today  Completed COVID vaccine        Subjective:   Chief Complaint: Beh Meh is an 41 y.o. female here for a preventative health visit.    Patient has been advised of split billing requirements and indicates understanding: Yes  HPI: The patient is here for physical.  Has history of gastric adenocarcinoma, closely followed by oncology.  She is complaining of bilateral breast tenderness for the past 3 weeks.  No palpable lump.  No nipple discharge.  She had normal mammogram in 1/21.  She is not taking any medications currently.      Healthy Habits  Are you taking a daily aspirin? No  Do you typically exercising at least 40 min, 3-4 times per week?  NO,  Do you usually eat at least 4 servings of fruit and vegetables a day, include whole grains and fiber and avoid regularly eating high fat foods? Yes  Have you had an eye exam in the past two years? NO  Do you see a dentist twice per year? NO  Do you have any concerns regarding sleep? No    Safety Screen  If you own firearms, are they secured in a locked gun cabinet or with trigger locks? The patient does not own any firearms  Do you feel you are safe where you are living?: Yes (6/3/2021  1:09 PM)  Do you feel you are safe in your relationship(s)?: Yes (6/3/2021  1:09 PM)      Review of Systems:  Please see  "above.  The rest of the review of systems are negative for all systems.       Cancer Screening       Status Date      PAP SMEAR Postponed 12/13/2022 Originally 12/2/2018. Temporary Medical Deferral     Done 12/2/2013 GYNECOLOGIC CYTOLOGY (PAP SMEAR)          History     Reviewed By Date/Time Sections Reviewed    Daniela Howard, [facility] 6/3/2021  1:09 PM Tobacco    Daniela Howard, [facility] 6/3/2021  1:02 PM Tobacco            Objective:   Vital Signs:   Visit Vitals  /76 (Patient Site: Left Arm, Patient Position: Sitting, Cuff Size: Adult Regular)   Pulse 78   Temp 98  F (36.7  C) (Oral)   Resp 16   Ht 5' 4\" (1.626 m)   Wt 167 lb 6 oz (75.9 kg)   LMP 05/16/2021 (Exact Date)   SpO2 97%   BMI 28.73 kg/m           PHYSICAL EXAM  Gen - alert, orientated, NAD  Eyes - fundascopic exam limited by the undialated pupil but looks symmetric  ENT - oropharynx clear, TMs clear  Neck - supple, no palpable mass or lymphadenopathy  CV - RRR, no murmur  Breast - no dominant mass on either side, no axillary mass.  Resp - lungs CTA  Ab - soft, nontender, no palpable mass or organomegaly.  All healed scar mid abdomen.   - normal appearance to the external genetalia, vaginal mucosa normal, physiologic discharge, no palpable adenexal mass, cervix appears normal  Extrem - warm, no edema  Neuro - CN II-XII intact, strength, sensation, reflexes intact and symmetric  Skin - no rash.  No atypical appearing lesions seen.       Additional Screenings Completed Today:     "

## 2021-06-28 NOTE — PROGRESS NOTES
Progress Notes by Juliana Casey RN at 12/17/2019 11:00 AM     Author: Juliana Casey RN Service: -- Author Type: Registered Nurse    Filed: 12/17/2019 12:52 PM Encounter Date: 12/17/2019 Status: Signed    : Juliana Casey RN (Registered Nurse)       Clinic Care Coordination Contact    Clinic Care Coordination Contact  OUTREACH    Referral Information:  Referral Source: PCP    Primary Diagnosis: Oncology    Chief Complaint   Patient presents with   ? Clinic Care Coordination - Initial        Clinic Utilization  Difficulty keeping appointments:: No  Compliance Concerns: Yes  No-Show Concerns: No  No PCP office visit in Past Year: No  Utilization    Last refreshed: 12/17/2019 11:24 AM:  Hospital Admissions 0           Last refreshed: 12/17/2019 11:24 AM:  ED Visits 1           Last refreshed: 12/17/2019 11:24 AM:  No Show Count (past year) 0              Current as of: 12/17/2019 11:24 AM              Clinical Concerns:  Current Medical Concerns:  Stomach cancer stage 3, hep B, h pylori, dysuria, vitamin D deficiency, heartburn, dyspepsia  Current Behavioral Concerns: memory difficulties  Education Provided to patient: yes   Pain  Pain (GOAL):: No  Health Maintenance Reviewed: Up to date  Clinical Pathway: None     Medication Management:  Patient currently taking medications out of the bottle.  Upon completing med rec; patient does not have all medications in her possession.  Writer called [facility] pharmacy.  Will send a note to PCP for a refill of Sucralfate.  Patient will to pay $4.00 for delivery from [facility].  Will wait until Sucralfate received to have both Maalox and Sucralfate delivered at the same time.  Scheduled patient to see CCC RN 1/10/20 for MEV appointment.  Instructed patient to bring ALL medications to this appointment.  Will have  schedule transportation.     Functional Status:  Dependent ADLs:: Independent  Dependent IADLs:: Independent  Bed or wheelchair confined::  No  Mobility Status: Independent  Fallen 2 or more times in the past year?: No  Any fall with injury in the past year?: No    Living Situation:  Current living arrangement:: I live in a private home with family  Type of residence:: Apartment    Diet/Exercise/Sleep:  Diet:: Regular  Inadequate nutrition (GOAL):: No  Food Insecurity: No  Tube Feeding: No  Exercise:: Unable to exercise  Inadequate activity/exercise (GOAL):: No  Significant changes in sleep pattern (GOAL): No    Transportation:  Transportation concerns (GOAL):: No  Transportation means:: Medical transport     Psychosocial:  Quaker or spiritual beliefs that impact treatment:: No  Mental health DX:: No  Mental health management concern (GOAL):: No  Informal Support system:: Family     Financial/Insurance:   Financial/Insurance concerns (GOAL):: No  Patient denies difficulty     Resources and Interventions:  Current Resources:    ;      Supplies used at home:: None  Equipment Currently Used at Home: none    Advance Care Plan/Directive  Advanced Care Plans/Directives on file:: No  Advanced Care Plan/Directive Status: Not Applicable    Referrals Placed: None     39yr F PMH: Stomach cancer stage 3, hep B, h pylori, dysuria, vitamin D deficiency, heartburn, dyspepsia, memory difficulties.  Currently lives in a house with 8 people.  (4 children, , sister & her mom) Patient started chemotherapy the beginning of November for a diagnosis of malignant neoplasm of the stomach.  She went to the ED once in December to due nausea and vomiting and was prescribed Zofran.  Patient did not bring any medications to her appointment today.  Attempted to complete med rec verbally with her.  See Med Note as listed above.  First available appointment made for 1/10/20 for patient to return with all mediations.  Patient currently receiving 'some transportation' from her brother-in-law.  At times does not have a ride home from Chemo.  Beh Meh arrived today with a full  "list of her chemo schedule and gave a copy to the  to assist with scheduling.    Beh Chandler stating that she has had jessica red blood from her rectum 'only' with bowel movements since Saturday.  Writer attempting to determine the quantity of blood via .  Beh Meh stating the \"water turns pink\".  'No blood between going to the bathroom'.  Patient stating that she 'told somebody at chemo yesterday and they said it can happen sometimes'.  Writer consulted with Dr. Jay in person, notify regarding all items above.  Dr. Jay came to the room to clarify all statements with patient and .  Patient again reiterating to PCP that she was told by an employee 'it can sometimes happen with chemo'.  Patient stating she has an appointment with her oncologist tomorrow 12/18/2019.  Writer had not seen this on the original appointment list.  Patient and  confirming that she does not need transportation. Patient encouraged by PCP and RN to notify Oncologist regarding rectal bleeding.  If symptoms worsen to please notify the Dr.  Patient stated an understanding.    Patient enrolled for Goal as listed below.     Goals:   Goals        General    Medication 1 (pt-stated)     Notes - Note created  12/17/2019 12:27 PM by Juliana Casey RN    Goal:  I would like to meet with the [facility] RN to understand my medications in the next 30-60 days.    Action steps to achieve this goal  1.   I will attend my January 10th, 2020 appointment with the CCC RN.  2.  I will bring all of my medications to this appointment.  3.  I have asked the clinic to assist me with transportation to this appointment.  4.  I will notify my Clinic Care Coordination Community Health Care Worker if I am having any difficulty with attending this appointment.    Date goal set:  12/17/2019            Barriers: Numerous medical appointments and coordinating  Strengths: Family support.  Patient/Caregiver understanding: Patient " stated an understanding.       Future Appointments              In 3 weeks [facility] Mammography, [facility]    In 3 weeks [facility] RN [facility] Clinical Support, [facility]    In 2 months Tk Jay MD [facility] , [facility]          Plan: DELEGATION: Patient has an appointment with CCC RN 1/10/2020 and will need transportation.  Could you please notify the ?

## 2021-06-28 NOTE — PROGRESS NOTES
Progress Notes by Radha Zimmer CHW at 4/13/2020  9:49 AM     Author: Radha Zimmer CHW Service: -- Author Type: Community Health Worker    Filed: 4/13/2020 10:26 AM Encounter Date: 4/13/2020 Status: Signed    : Radha Zimmer CHW (Community Health Worker)       Clinic Care Coordination Contact    Follow Up Progress Note        Goals addressed this encounter:     Goals Addressed                 This Visit's Progress       Patient Stated    ? COMPLETED: Mental Health (pt-stated)        Goal Statement: I want to establish with an [facility] worker and in-home therapy in the next 3 months.    Date Goal set: 01/10/2020  Barriers: Language  Strengths: family support  Date to Achieve By: 4/10/2020  Patient expressed understanding of goal: Yes    Personal Plan:  1. I have been assigned and [facility] soham Elizabeth at [phone].          ? COMPLETED: Other (pt-stated)        Goal Statement: I would like to find out if I qualify for PCA services in the next 90 days.    Date Goal set: 01/10/2020  Barriers: Language  Strengths: family support  Date to Achieve By: 4/10/2020  Patient expressed understanding of goal: Yes    Personal Plan:  1. I have been approved for 10 hours of PCA services per day.                     Intervention/Education provided during outreach:     Beh Chandler has been approved for 10 hours of PCA services.    Son (Per Seth) and daughter (Oo Chandler) will be her PCA's.    Beh Meh reports she has spoken with her [facility] worker but was not able to state the name and phone number of the ARM worker. CHW was able to get this info from [facility]    [facility] SohamElizabeth, [phone]    [facility]h [facility] does want a referral to [employer] to apply for SSI. - NEW GOAL CREATED.    CHW did use the online referral form on the [employer] website. See below for confirmation. They will outreach to Beh Meh in this week.        Plan:      - CHW will route to [facility] regarding the CHW's attempt to  complete the online [employer] application just to let her know that the CHW did not have the last 4 digits of the SSN.      Care Coordinator will follow up on 5/19/2020

## 2021-06-29 NOTE — PROGRESS NOTES
Progress Notes by Ciarra Guevara RN at 6/25/2020 11:15 AM     Author: Ciarra Guevara RN Service: -- Author Type: Registered Nurse    Filed: 6/25/2020 11:18 AM Encounter Date: 6/25/2020 Status: Signed    : Ciarra Guevara RN (Registered Nurse)       Clinic Care Coordination Contact    Situation: Patient chart reviewed by care coordinator.    Background:   Radha Zimmer CHW  You 6 days ago       Haroon Lafleur, please review for Maintenance, thank you!    Routing comment          Assessment:   Chart review completed today  No ED visits or hospitalization the past 60 days.   She will continues to work with her [facility] worker to f/u on RSDI/SSI application status.   Completed goals.  No new concerns or new goals to work on      Plan/Recommendations:   Okay to step down to maintenance.

## 2021-06-29 NOTE — PROGRESS NOTES
Progress Notes by Ciarra Guevara RN at 8/20/2020  4:59 AM     Author: Ciarra Guevara RN Service: -- Author Type: Registered Nurse    Filed: 8/20/2020  5:03 AM Encounter Date: 8/20/2020 Status: Signed    : Ciarra Guevara RN (Registered Nurse)       Clinic Care Coordination Contact    Assessment: Care Coordinator contacted patient for 2 month follow up.  Patient has continued to follow the plan of care and assessment is negative for any new needs or concerns.    Enrollment status: Graduated.      Plan: No further outreaches at this time.  Patient will continue to follow the plan of care.  If new needs arise a new Care Coordination referral may be placed.  FYI to PCP    Clinic Care Coordination Contact    Assessment: Care Coordinator contacted patient for 2 month follow up.  Patient has continued to follow the plan of care and assessment is negative for any new needs or concerns.    Enrollment status: Graduated.      Plan: No further outreaches at this time.  Patient will continue to follow the plan of care.  If new needs arise a new Care Coordination referral may be placed.  FYI to PCP        Situation: Patient chart reviewed by care coordinator.    Background:   Radha Zimmer, BARBARA  You 2 days ago       Haroon Lafleur, please review for Graduation, thank you!    Routing comment          Assessment:  Chart review completed today  No ED visits or hospitalization the past 60 days.   Completed all goals and no new concerns.     Plan/Recommendations:   Okay to graduate

## 2021-07-02 NOTE — H&P
H&P by Milagros Price NP at 11/22/2019  9:00 AM     Author: Milagros Price NP Service: Interventional Radiology Author Type: Nurse Practitioner    Filed: 11/22/2019  9:03 AM Date of Service: 11/22/2019  9:00 AM Status: Signed    : Milagros Price NP (Nurse Practitioner)         Interventional Radiology - History and Physical  11/22/2019    Procedure Requested: Port placement   Requesting Provider: Dr. Bonilla     HPI: Beh Meh is a 39 y.o. old female with a history of GERD, H pylori, Hepatitis B and a recently diagnosed gastric cancer that presents for port placement for treatment.      Patient seen with her significant other at the bedside.  There is no live  available.      We used [employer] .  Patient and significant other wanted to proceed with this.  They did not want to reschedule.      NPO Status: Midnight   Anticoagulation/Antiplatelets/Bleeding tendencies: None   Antibiotics: Ancef dose ordered pre procedure     Review of Systems: A comprehensive 10-point review of systems was performed. All systems were reviewed and negative with exception to those reported in the HPI.    PMH:  Past Medical History:   Diagnosis Date   ? Acute cholecystitis 3/3/2015   ? GERD (gastroesophageal reflux disease)     known h/o H pylori cleared per 4/13/18 scope; getting scoped 10/2019   ? Hepatitis B        PSH:  Past Surgical History:   Procedure Laterality Date   ? LAPAROSCOPIC CHOLECYSTECTOMY N/A 3/3/2015    Procedure: CHOLECYSTECTOMY LAPAROSCOPIC;  Surgeon: Tigre Navarrete MD;  Location: [facility];  Service:        ALLERGIES  Patient has no known allergies.    MEDICATIONS:  Current Outpatient Medications on File Prior to Encounter   Medication Sig Dispense Refill   ? acetaminophen (TYLENOL) 325 MG tablet Take 2 tablets (650 mg total) by mouth every 4 (four) hours as needed for pain. 100 tablet 2   ? aluminum-magnesium hydroxide-simethicone (MAALOX ADVANCED) 200-200-20 mg/5 mL Susp Take  "15 mL by mouth 3 (three) times a day with meals. 769 mL 6   ? omeprazole (PRILOSEC) 20 MG capsule Take 1 capsule (20 mg total) by mouth 2 (two) times a day before meals. 60 capsule 3   ? sucralfate (CARAFATE) 1 gram tablet TAKE 1 TABLET BY MOUTH FOUR TIMES A DAY.. 40 tablet 0   ? sucralfate (CARAFATE) 100 mg/mL suspension Take 10 mL (1 g total) by mouth 4 (four) times a day. 414 mL 0     No current facility-administered medications on file prior to encounter.        LABS:  Hemoglobin (g/dL)   Date Value   10/10/2018 12.6     Platelets (thou/uL)   Date Value   10/10/2018 237       EXAM:  /76   Pulse 75   Temp 97.6  F (36.4  C)   Resp 16   Ht 5' 4\" (1.626 m)   Wt 157 lb (71.2 kg)   BMI 26.95 kg/m    General: Stable. In no acute distress  Neuro: A&O x3.  Moves all extremities equally.  Resp: Lungs CTA bilaterally.  Cardio: S1S2 and reg, without murmur, clicks or rubs.  Abdomen: Soft, non-distended and non-tender.  MSK:  No gross motor weakness.  Sensation intact.      Pre-Sedation Assessment:  Mallampati Airway Classification: Class 1: entire tonsil clearly visble  Previous reaction to anesthesia/sedation: no  Sedation plan based on assessment: Moderate  Sleep Apnea: no  Dentures: no  COPD: no  ASA Classification: ASA 3 - Patient with moderate systemic disease with functional limitations  Comments: no hx of asthma       ASSESSMENT:  39 y.o. old female with a recently diagnosed gastric cancer that presents for port placement for treatment.        PLAN:    Right chest port placement with sedation.      The procedure, risks and moderate sedation were discussed with patient, all questions answered and patient agrees to proceed with the procedure.  Written consent obtained.      Milagros AMIN, CNP  Interventional Radiology  [phone]         "

## 2021-07-02 NOTE — H&P
H&P by Romy Tatum Chi, PA-C at 2020  9:00 AM     Author: Romy Tatum Chi, PA-C Service: Interventional Radiology Author Type: Physician Assistant    Filed: 2020  8:51 AM Date of Service: 2020  9:00 AM Status: Signed    : Romy Tatum Chi, PA-C (Physician Assistant)         Interventional Radiology - Pre-Procedure Note:  2020    Procedure Requested: IR port Removal-RIGHT  Requested by: Adrian Bonilla MD     History and Physical Reviewed: H&P documented within 30 days (by Adrian Bonilla MD on 7/10/2020).  I have personally reviewed the patient's medical history and have updated the medical record as necessary.    Brief HPI: Beh Meh is a 40 y.o. old Karen[place] speaking female with history of gastric cancer s/p gastrectomy s/p IR RIJ IJ port placement on 2019 now presents for port removal after completion of chemotherapy.      There is intermittent itchiness associated with catheter however denies any fevers, chills, port tenderness or malfunction.      IMAGIN2019:  RIJ Port Place:  PROCEDURE: After discussing the procedure and risks, informed consent was obtained. Permanent ultrasound images were obtained of the right internal jugular vein, documenting patency and compressibility.     The right neck and upper chest were prepped and draped. After local anesthesia, the jugular vein was punctured under direct ultrasound guidance, and a small dilator was placed. A short transverse skin incision was made below the clavicle, and a pocket   was created for the port. A skin tunnel was created from the pocket to the vein entry site, and the catheter was pulled through the tunnel. The vein was dilated and the catheter inserted through a peel-away sheath, positioning the tip fluoroscopically at   the SVC/atriocaval junction. The catheter was cut to an appropriate length. The catheter was then attached to the port itself and the port was placed within the subcutaneous pocket.  The port was flushed with heparin. The incision was closed with layered   absorbable suture and covered with Dermabond. The patient tolerated the procedure, and there were no immediate complications.     FINDINGS: Ultrasound shows an anechoic and compressible jugular vein. A permanent ultrasound image of this was saved. After placement, fluoroscopic spot images show that the tip of the catheter is at the SVC/atriocaval junction.     IMPRESSION:   1.  Uneventful venous access port placement. This port is power injector compatible.    NPO: midnight   ANTICOAGULANTS: none   ANTIBIOTICS: None needed    ALLERGIES  Patient has no known allergies.    LABS:  INR (no units)   Date Value   05/12/2015 1.10     Hemoglobin (g/dL)   Date Value   02/28/2020 10.8 (L)     Platelets (thou/uL)   Date Value   01/27/2020 124 (L)     Creatinine (mg/dL)   Date Value   02/28/2020 0.63     Potassium (mmol/L)   Date Value   02/28/2020 4.1     EXAM:  /66   Pulse 75   Temp 98.3  F (36.8  C) (Oral)   Resp 18   Wt 144 lb (65.3 kg)   SpO2 98%   BMI 24.72 kg/m    General: Stable. In no acute distress.  Neuro: A&O x 3. Moves all extremities equally.  Resp: Lungs clear to auscultation bilaterally.  Cardio: S1S2 and reg, without murmur, clicks or rubs  Skin: Without excoriations, ecchymosis, erythema, lesions or open sores on right port/neck/chest wall.    Pre-Sedation Assessment:  Mallampati Airway Classification: Class 2: upper half of tonsil fossa visible  Previous reaction to anesthesia/sedation: no  Sedation plan based on assessment: Moderate  ASA Classification: ASA 3 - Patient with moderate systemic disease with functional limitations    ASSESSMENT/PLAN:  Gastric cancer s/p RIJ port placement on 11/21/2019.    RIGHT Port Removal with sedation.      Procedure, risk/benefits, and sedation reviewed with pt/family. All questions answered. Consent obtained via [employer] . OK to proceed with above radiology procedure.     Romy  Don Tatum  Interventional Radiology

## 2021-07-04 NOTE — LETTER
Letter by Daniela Blount RN at      Author: Daniela Blount RN Service: -- Author Type: --    Filed:  Encounter Date: 6/10/2021 Status: (Other)         [facility]  [address] [place] [address]             Mia 10, 2021         Dear MsMary Arteaga,    We are happy to inform you that your recent Pap smear and Human Papillomavirus (HPV) test results are normal and negative.    It is recommended that you have your next Pap smear and Human Papillomavirus (HPV) test in 5 years. You will also need to return to the clinic every year for an annual wellness visit.    If you have additional questions regarding this result, please contact our office and we will be happy to assist you.      Sincerely,    Your [facility] Care Team

## 2021-07-06 VITALS
SYSTOLIC BLOOD PRESSURE: 108 MMHG | BODY MASS INDEX: 28.57 KG/M2 | HEIGHT: 64 IN | WEIGHT: 167.38 LBS | OXYGEN SATURATION: 97 % | TEMPERATURE: 98 F | DIASTOLIC BLOOD PRESSURE: 76 MMHG | RESPIRATION RATE: 16 BRPM | HEART RATE: 78 BPM

## 2021-07-08 ENCOUNTER — RECORDS - HEALTHEAST (OUTPATIENT)
Dept: ADMINISTRATIVE | Facility: OTHER | Age: 41
End: 2021-07-08

## 2021-10-29 ENCOUNTER — OFFICE VISIT (OUTPATIENT)
Dept: FAMILY MEDICINE | Facility: CLINIC | Age: 41
End: 2021-10-29
Payer: COMMERCIAL

## 2021-10-29 VITALS
WEIGHT: 176 LBS | TEMPERATURE: 98.3 F | DIASTOLIC BLOOD PRESSURE: 72 MMHG | HEIGHT: 64 IN | HEART RATE: 72 BPM | RESPIRATION RATE: 14 BRPM | BODY MASS INDEX: 30.05 KG/M2 | OXYGEN SATURATION: 98 % | SYSTOLIC BLOOD PRESSURE: 100 MMHG

## 2021-10-29 DIAGNOSIS — R07.0 THROAT DISCOMFORT: Primary | ICD-10-CM

## 2021-10-29 DIAGNOSIS — K59.00 CONSTIPATION, UNSPECIFIED CONSTIPATION TYPE: ICD-10-CM

## 2021-10-29 PROCEDURE — 90682 RIV4 VACC RECOMBINANT DNA IM: CPT | Performed by: FAMILY MEDICINE

## 2021-10-29 PROCEDURE — 90471 IMMUNIZATION ADMIN: CPT | Performed by: FAMILY MEDICINE

## 2021-10-29 PROCEDURE — 99214 OFFICE O/P EST MOD 30 MIN: CPT | Mod: 25 | Performed by: FAMILY MEDICINE

## 2021-10-29 RX ORDER — POLYETHYLENE GLYCOL 3350 17 G/17G
17 POWDER, FOR SOLUTION ORAL DAILY PRN
Qty: 507 G | Refills: 11 | Status: SHIPPED | OUTPATIENT
Start: 2021-10-29

## 2021-10-29 ASSESSMENT — MIFFLIN-ST. JEOR: SCORE: 1448.33

## 2021-10-29 NOTE — PROGRESS NOTES
"ASSESSMENT/PLAN:    Throat discomfort  Uncertain etiology, but given her history of cancer, I think this deserves further investigation.  It seems that she is pretty up-to-date on her gastric cancer follow-up, so recurrence of that is less likely.  We will have her see ENT.  In the meantime, okay to continue PPI in case reflux is playing a role. I discussed with her PCP to make her aware and seek any other history/advice.    Constipation, unspecified constipation type  Med refilled  - polyethylene glycol (MIRALAX) 17 GM/Dose powder  Dispense: 507 g; Refill: 11       Return in about 1 month (around 11/29/2021) for Follow up meds, abnormal throat sensation (with Dr Jay).          SUBJECTIVE:  Beh Meh is a 41 year old female here for F/U throat problem and Back Pain (lower X on going )      Throat:  Middle front of throat discomfort x1m.  Something bothers it. Makes a noise when she touches it. Not outright painful.  Feels like something is broken.    Happened last year and went away after medication.    Also says, though, the last time she had this pain she ended up being diagnosed with gastric cancer.    Records reviewed: Gastric cancer diagnosed November 2019 after uncontrolled gastritis type symptoms.  It looks like her oncologist is through [facility].  Last visit on the chart with onc was 5/12/21; last imaging was 7/8/2021 (CT of abdomen and pelvis was negative for cancer recurrence)      OBJECTIVE:  :  /72   Pulse 72   Temp 98.3  F (36.8  C) (Oral)   Resp 14   Ht 1.626 m (5' 4\")   Wt 79.8 kg (176 lb)   LMP 10/03/2021 (Approximate)   SpO2 98%   Breastfeeding No   BMI 30.21 kg/m    Wt Readings from Last 3 Encounters:   10/29/21 79.8 kg (176 lb)   06/03/21 75.9 kg (167 lb 6 oz)   09/15/20 66.8 kg (147 lb 5 oz)         Gen:  A&A, NAD  HEENT: Oropharynx pink moist benign  Neck:  supple, no sig LAD or thyromegally.  There is some crepitus over the hyoid cartilage.  CV:  HRRR, no M/R/G  Resp:  " CTAB  Abd:  S&NT, no mass  Ext:  W&D, no edema

## 2021-12-01 ENCOUNTER — OFFICE VISIT (OUTPATIENT)
Dept: OTOLARYNGOLOGY | Facility: CLINIC | Age: 41
End: 2021-12-01
Attending: FAMILY MEDICINE
Payer: COMMERCIAL

## 2021-12-01 DIAGNOSIS — R07.0 THROAT DISCOMFORT: ICD-10-CM

## 2021-12-01 PROCEDURE — 99243 OFF/OP CNSLTJ NEW/EST LOW 30: CPT | Performed by: OTOLARYNGOLOGY

## 2021-12-01 RX ORDER — DEXAMETHASONE 4 MG/1
4 TABLET ORAL
COMMUNITY
End: 2021-12-03

## 2021-12-01 NOTE — LETTER
"    12/1/2021         RE: Beh Meh  [address]  [place] [address]        Dear Colleague,    Thank you for referring your patient, Beh Meh, to the [facility]. Please see a copy of my visit note below.    HPI: This patient is a 42yo F who presents for evaluation of the throat at the request of Dr. Zafar. The patient states that when she touches her neck, she feels her cartilages \"move and sometimes it makes noise.\" While describing it, she grabs her thyroid cartilage with her hand and moves it back an forth. She reports no pain with this. She is worried something \"is broken.\" Denies fevers, otalgia, weight loss, odynophagia, dysphagia, hemoptysis, and shortness of breath.     Past medical history, surgical history, social history, family history, medications, and allergies have been reviewed with the patient and are documented above.    Review of Systems: a 10-system review was performed. Pertinent positives are noted in the HPI and on a separate scanned document in the chart.    PHYSICAL EXAMINATION:  GEN: no acute distress, normocephalic  EYES: extraocular movements are intact, pupils are equal and round. Sclera clear.   EARS: auricles are normally formed. The external auditory canals are clear with minimal to no cerumen. Tympanic membranes are intact bilaterally with no signs of infection, effusion, retractions, or perforations.  NOSE: anterior nares are patent. There are no masses or lesions. The septum is non-obstructing.  OC/OP: clear, dentition is in good repair. The tongue and palate are fully mobile and symmetric. No masses or lesions. Cobblestoning of the posterior pharyngeal wall.  NECK: soft and supple. No lymphadenopathy or masses. Airway is midline. Airway anatomy is mobile over the spine, normal.  NEURO: CN VII and XII symmetric. alert and oriented. No spontaneous nystagmus. Gait is normal.  PULM: breathing comfortably on room air, normal chest expansion with " respiration  CARDS: no cyanosis or clubbing, normal carotid pulses    MEDICAL DECISION-MAKING: This patient is a 40yo F with worries about the mobility of her airway construct. She states that she had never noticed it before and it worried her. Explained that this mobility is normal and the occasional sound is simply the cartilages/ligaments rubbing. Nothing of concern, no further workup needed. Reassurance given.         Again, thank you for allowing me to participate in the care of your patient.        Sincerely,        Ramonita Ohara MD

## 2021-12-01 NOTE — PROGRESS NOTES
"HPI: This patient is a 42yo F who presents for evaluation of the throat at the request of Dr. Zafar. The patient states that when she touches her neck, she feels her cartilages \"move and sometimes it makes noise.\" While describing it, she grabs her thyroid cartilage with her hand and moves it back an forth. She reports no pain with this. She is worried something \"is broken.\" Denies fevers, otalgia, weight loss, odynophagia, dysphagia, hemoptysis, and shortness of breath.     Past medical history, surgical history, social history, family history, medications, and allergies have been reviewed with the patient and are documented above.    Review of Systems: a 10-system review was performed. Pertinent positives are noted in the HPI and on a separate scanned document in the chart.    PHYSICAL EXAMINATION:  GEN: no acute distress, normocephalic  EYES: extraocular movements are intact, pupils are equal and round. Sclera clear.   EARS: auricles are normally formed. The external auditory canals are clear with minimal to no cerumen. Tympanic membranes are intact bilaterally with no signs of infection, effusion, retractions, or perforations.  NOSE: anterior nares are patent. There are no masses or lesions. The septum is non-obstructing.  OC/OP: clear, dentition is in good repair. The tongue and palate are fully mobile and symmetric. No masses or lesions. Cobblestoning of the posterior pharyngeal wall.  NECK: soft and supple. No lymphadenopathy or masses. Airway is midline. Airway anatomy is mobile over the spine, normal.  NEURO: CN VII and XII symmetric. alert and oriented. No spontaneous nystagmus. Gait is normal.  PULM: breathing comfortably on room air, normal chest expansion with respiration  CARDS: no cyanosis or clubbing, normal carotid pulses    MEDICAL DECISION-MAKING: This patient is a 42yo F with worries about the mobility of her airway construct. She states that she had never noticed it before and it worried her. " Explained that this mobility is normal and the occasional sound is simply the cartilages/ligaments rubbing. Nothing of concern, no further workup needed. Reassurance given.

## 2021-12-03 ENCOUNTER — OFFICE VISIT (OUTPATIENT)
Dept: FAMILY MEDICINE | Facility: CLINIC | Age: 41
End: 2021-12-03
Payer: COMMERCIAL

## 2021-12-03 VITALS
HEART RATE: 70 BPM | OXYGEN SATURATION: 97 % | BODY MASS INDEX: 30.38 KG/M2 | DIASTOLIC BLOOD PRESSURE: 74 MMHG | RESPIRATION RATE: 16 BRPM | WEIGHT: 177 LBS | TEMPERATURE: 98.1 F | SYSTOLIC BLOOD PRESSURE: 102 MMHG

## 2021-12-03 DIAGNOSIS — Z59.9 FINANCIAL DIFFICULTIES: ICD-10-CM

## 2021-12-03 DIAGNOSIS — R07.0 THROAT DISCOMFORT: Primary | ICD-10-CM

## 2021-12-03 DIAGNOSIS — M54.50 CHRONIC MIDLINE LOW BACK PAIN WITHOUT SCIATICA: ICD-10-CM

## 2021-12-03 DIAGNOSIS — Z23 HIGH PRIORITY FOR 2019-NCOV VACCINE: ICD-10-CM

## 2021-12-03 DIAGNOSIS — C16.9 GASTRIC CARCINOMA (H): ICD-10-CM

## 2021-12-03 DIAGNOSIS — B18.1 CHRONIC VIRAL HEPATITIS B WITHOUT DELTA AGENT AND WITHOUT COMA (H): ICD-10-CM

## 2021-12-03 DIAGNOSIS — G89.29 CHRONIC MIDLINE LOW BACK PAIN WITHOUT SCIATICA: ICD-10-CM

## 2021-12-03 LAB
AFP SERPL-MCNC: 2.7 NG/ML
ALBUMIN SERPL-MCNC: 4.2 G/DL (ref 3.5–5)
ALP SERPL-CCNC: 102 U/L (ref 45–120)
ALT SERPL W P-5'-P-CCNC: 19 U/L (ref 0–45)
AST SERPL W P-5'-P-CCNC: 20 U/L (ref 0–40)
BILIRUB DIRECT SERPL-MCNC: 0.1 MG/DL
BILIRUB SERPL-MCNC: 0.4 MG/DL (ref 0–1)
PROT SERPL-MCNC: 8 G/DL (ref 6–8)

## 2021-12-03 PROCEDURE — 99214 OFFICE O/P EST MOD 30 MIN: CPT | Mod: 25 | Performed by: FAMILY MEDICINE

## 2021-12-03 PROCEDURE — 80076 HEPATIC FUNCTION PANEL: CPT | Performed by: FAMILY MEDICINE

## 2021-12-03 PROCEDURE — 36415 COLL VENOUS BLD VENIPUNCTURE: CPT | Performed by: FAMILY MEDICINE

## 2021-12-03 PROCEDURE — 91300 COVID-19,PF,PFIZER (12+ YRS): CPT | Performed by: FAMILY MEDICINE

## 2021-12-03 PROCEDURE — 87517 HEPATITIS B DNA QUANT: CPT | Performed by: FAMILY MEDICINE

## 2021-12-03 PROCEDURE — 0004A COVID-19,PF,PFIZER (12+ YRS): CPT | Performed by: FAMILY MEDICINE

## 2021-12-03 PROCEDURE — 82105 ALPHA-FETOPROTEIN SERUM: CPT | Performed by: FAMILY MEDICINE

## 2021-12-03 RX ORDER — GABAPENTIN 300 MG/1
300 CAPSULE ORAL 2 TIMES DAILY
Qty: 60 CAPSULE | Refills: 3 | Status: SHIPPED | OUTPATIENT
Start: 2021-12-03 | End: 2022-06-07

## 2021-12-03 SDOH — ECONOMIC STABILITY - INCOME SECURITY: PROBLEM RELATED TO HOUSING AND ECONOMIC CIRCUMSTANCES, UNSPECIFIED: Z59.9

## 2021-12-03 NOTE — PROGRESS NOTES
ASSESMENT AND PLAN:  Throat discomfort  Saw ENT recently.  No abnormal findings, reassured.    Chronic midline low back pain without sciatica  We will try gabapentin.  X-ray today.  Will consider PT.  - XR Lumbar Spine 2/3 Views; Future  - gabapentin (NEURONTIN) 300 MG capsule; Take 1 capsule (300 mg) by mouth 2 times daily    Gastric carcinoma (H)  Completed treatment, followed by oncology every 6 months.    Chronic viral hepatitis B without delta agent and without coma (H)  GI referral after test results if indicated.  - Hep B Virus DNA Quant Real Time PCR; Future  - Hepatic panel; Future  - AFP tumor marker; Future  - Hep B Virus DNA Quant Real Time PCR  - Hepatic panel  - AFP tumor marker    Financial difficulties  - Care Coordination Referral; Future    This transcription uses voice recognition software, which may contain typographical errors.      SUBJECTIVE: Beh Meh is a 41-year-old female with history of gastric cancer, hepatitis B and back pain here to follow-up on throat discomfort.  She saw Dr. Roberts for throat discomfort, foreign body sensation.  Note reviewed.  She was referred to ENT.  She was seen by ENT 2 days ago, no abnormal findings and reassurance given.  Patient is comfortable with the plan.  She reports chronic midline low back pain for about 2 years.  She reports a burning sensation in the back, worse when sitting or standing for long period of time.  No radiation to the pain.  No tingling and numbness in the lower extremities.  She states she stopped receiving food stamp.  She is not sure if she can work at this time.  She would like to speak to our  regarding resources.    Past Medical History:   Diagnosis Date     Hepatitis B      Patient Active Problem List   Diagnosis     Chronic midline low back pain without sciatica     Chronic viral hepatitis B without delta agent and without coma (H)       Allergies:  No Known Allergies    History   Smoking Status     Never Smoker    Smokeless Tobacco     Never Used       Review of systems otherwise negative except as listed in HPI.   History   Smoking Status     Never Smoker   Smokeless Tobacco     Never Used       OBJECTICE: /74   Pulse 70   Temp 98.1  F (36.7  C) (Oral)   Resp 16   Wt 80.3 kg (177 lb)   LMP 11/15/2021 (Approximate)   SpO2 97%   BMI 30.38 kg/m      DATA REVIEWED:  Additional History from Old Records Summarized (2):  Radiology Tests Summarized or Ordered (1):   Labs Reviewed or Ordered (1):       GEN-alert,  in no apparent distress.  HEENT- neck is supple.  CV-regular rate and rhythm with no murmur.   RESP-lungs clear to auscultation .  ABDOMEN- Soft , not tender.  EXTREM- No edema.  BACK- No tenderness on palpation.  Negative straight leg raising test.  SKIN-no jaundice        Tk Jay MD   12/3/2021

## 2021-12-07 LAB
HBV DNA SERPL NAA+PROBE-ACNC: 361 IU/ML
HBV DNA SERPL NAA+PROBE-LOG IU: 2.6 {LOG_IU}/ML

## 2022-01-04 ENCOUNTER — OFFICE VISIT (OUTPATIENT)
Dept: FAMILY MEDICINE | Facility: CLINIC | Age: 42
End: 2022-01-04
Payer: COMMERCIAL

## 2022-01-04 VITALS
DIASTOLIC BLOOD PRESSURE: 72 MMHG | HEART RATE: 62 BPM | RESPIRATION RATE: 16 BRPM | BODY MASS INDEX: 30.48 KG/M2 | TEMPERATURE: 98 F | OXYGEN SATURATION: 97 % | WEIGHT: 178.5 LBS | SYSTOLIC BLOOD PRESSURE: 110 MMHG | HEIGHT: 64 IN

## 2022-01-04 DIAGNOSIS — B18.1 CHRONIC VIRAL HEPATITIS B WITHOUT DELTA AGENT AND WITHOUT COMA (H): ICD-10-CM

## 2022-01-04 DIAGNOSIS — C16.9 GASTRIC ADENOCARCINOMA (H): ICD-10-CM

## 2022-01-04 DIAGNOSIS — M54.50 CHRONIC MIDLINE LOW BACK PAIN WITHOUT SCIATICA: Primary | ICD-10-CM

## 2022-01-04 DIAGNOSIS — G89.29 CHRONIC MIDLINE LOW BACK PAIN WITHOUT SCIATICA: Primary | ICD-10-CM

## 2022-01-04 PROCEDURE — 99214 OFFICE O/P EST MOD 30 MIN: CPT | Performed by: FAMILY MEDICINE

## 2022-01-04 RX ORDER — ACETAMINOPHEN 500 MG
500-1000 TABLET ORAL EVERY 8 HOURS PRN
Qty: 90 TABLET | Refills: 0 | Status: SHIPPED | OUTPATIENT
Start: 2022-01-04 | End: 2022-04-05

## 2022-01-04 ASSESSMENT — MIFFLIN-ST. JEOR: SCORE: 1454.67

## 2022-01-04 NOTE — PROGRESS NOTES
"ASSESMENT AND PLAN:  Chronic midline low back pain without sciatica  Did not tolerate gabapentin.  She will take Tylenol almost daily as needed.  Advised to avoid taking Tylenol daily due to history of hepatitis B.  Reviewed x-ray with the patient.  Multilevel mild degenerative changes.  No red flag symptoms.  - Physical Therapy Referral; Future  - acetaminophen (TYLENOL) 500 MG tablet; Take 1-2 tablets (500-1,000 mg) by mouth every 8 hours as needed for mild pain    Chronic viral hepatitis B without delta agent and without coma (H)  Recheck labs in a few months.    Gastric adenocarcinoma (H)  Managed by oncology.  Patient states oncology will call her for follow-up.    This transcription uses voice recognition software, which may contain typographical errors.      SUBJECTIVE: Beh Meh is a 42-year-old female with history of chronic hepatitis B, gastric adenocarcinoma and chronic low back pain here for follow-up.  She was given gabapentin for back pain.  States it made her\" weak\" and stopped taking it after 3 days.  She did not think it helps.  The pain is in the lower back, no radiation.  Its not severe pain but experiencing significant pain at least once a week.  No urinary or bowel symptoms.  She completed chemo and radiation for gastric cancer.  Last seen by oncology in May 2021 per chart review.  Patient was not able to recall when her last visit with oncology was but states they always call her when it is time for her to follow-up.  She is doing well after the treatment.  She is not, was never on medication for hepatitis B.  DNA quant was 361 and negative AFP tumor marker with normal hepatitis function panel a month ago.  No history of jaundice.    Past Medical History:   Diagnosis Date     Hepatitis B      Patient Active Problem List   Diagnosis     Chronic midline low back pain without sciatica     Chronic viral hepatitis B without delta agent and without coma (H)       Allergies:  No Known " "Allergies    History   Smoking Status     Never Smoker   Smokeless Tobacco     Never Used       Review of systems otherwise negative except as listed in HPI.   History   Smoking Status     Never Smoker   Smokeless Tobacco     Never Used       OBJECTICE: /72 (BP Location: Left arm, Patient Position: Sitting, Cuff Size: Adult Regular)   Pulse 62   Temp 98  F (36.7  C) (Oral)   Resp 16   Ht 1.626 m (5' 4\")   Wt 81 kg (178 lb 8 oz)   LMP 12/30/2021 (Exact Date)   SpO2 97%   BMI 30.64 kg/m      DATA REVIEWED:    Radiology Tests Summarized or Ordered (1):   Labs Reviewed or Ordered (1):       GEN-alert,  in no apparent distress.  HEENT-mucous membranes are moist, neck is supple.  CV-regular rate and rhythm with no murmur.   RESP-lungs clear to auscultation .  ABDOMEN- Soft , not tender.  BACK-mild tenderness lower lumbar paraspinal area.  Negative straight leg raising test.  SKIN-normal        Tk Jay MD   1/4/2022     "

## 2022-01-19 ENCOUNTER — TRANSFERRED RECORDS (OUTPATIENT)
Dept: HEALTH INFORMATION MANAGEMENT | Facility: CLINIC | Age: 42
End: 2022-01-19
Payer: COMMERCIAL

## 2022-01-25 ENCOUNTER — TRANSFERRED RECORDS (OUTPATIENT)
Dept: HEALTH INFORMATION MANAGEMENT | Facility: CLINIC | Age: 42
End: 2022-01-25
Payer: COMMERCIAL

## 2022-04-05 ENCOUNTER — OFFICE VISIT (OUTPATIENT)
Dept: FAMILY MEDICINE | Facility: CLINIC | Age: 42
End: 2022-04-05
Payer: MEDICARE

## 2022-04-05 VITALS
DIASTOLIC BLOOD PRESSURE: 76 MMHG | SYSTOLIC BLOOD PRESSURE: 100 MMHG | BODY MASS INDEX: 30.48 KG/M2 | RESPIRATION RATE: 16 BRPM | WEIGHT: 178.5 LBS | HEIGHT: 64 IN | HEART RATE: 60 BPM | TEMPERATURE: 97.6 F

## 2022-04-05 DIAGNOSIS — M54.50 CHRONIC MIDLINE LOW BACK PAIN WITHOUT SCIATICA: Primary | ICD-10-CM

## 2022-04-05 DIAGNOSIS — B18.1 CHRONIC VIRAL HEPATITIS B WITHOUT DELTA AGENT AND WITHOUT COMA (H): ICD-10-CM

## 2022-04-05 DIAGNOSIS — G89.29 CHRONIC MIDLINE LOW BACK PAIN WITHOUT SCIATICA: Primary | ICD-10-CM

## 2022-04-05 DIAGNOSIS — C16.9 GASTRIC CARCINOMA (H): ICD-10-CM

## 2022-04-05 PROCEDURE — 99213 OFFICE O/P EST LOW 20 MIN: CPT | Performed by: FAMILY MEDICINE

## 2022-04-05 RX ORDER — CYCLOBENZAPRINE HCL 5 MG
5 TABLET ORAL 2 TIMES DAILY PRN
Qty: 60 TABLET | Refills: 1 | Status: SHIPPED | OUTPATIENT
Start: 2022-04-05 | End: 2022-11-11

## 2022-04-05 ASSESSMENT — PAIN SCALES - GENERAL: PAINLEVEL: NO PAIN (0)

## 2022-04-05 NOTE — PROGRESS NOTES
"ASSESMENT AND PLAN:  Chronic midline low back pain without sciatica  Added Flexeril.  She will continue Tylenol as needed.  - cyclobenzaprine (FLEXERIL) 5 MG tablet; Take 1 tablet (5 mg) by mouth 2 times daily as needed for muscle spasms    Chronic viral hepatitis B without delta agent and without coma (H)  Low viral DNA quant 4 months ago.    Gastric carcinoma (H)  Closely followed by oncology.  Appointment scheduled this month.    This transcription uses voice recognition software, which may contain typographical errors.      SUBJECTIVE: Beh Meh is a 42-year-old female with history of gastric cancer, chronic hepatitis B here to follow-up on back pain.  She was referred to physical therapy but did not go to her appointment due to financial concerns.  She still having lower back pain, no radiation.  No tingling or numbness in the lower extremities.  She takes Tylenol as needed.  She did not tolerate gabapentin, \"made her too weak\".  She was recently seen by oncology in January, due to follow-up in April.  States appointment already scheduled.  She completed chemo and radiation in 2020.  Her hep B DNA quant was 361 four months ago.    History reviewed. No pertinent past medical history.  Patient Active Problem List   Diagnosis     Chronic midline low back pain without sciatica     Chronic viral hepatitis B without delta agent and without coma (H)     Gastric carcinoma (H)       Allergies:  No Known Allergies    History   Smoking Status     Never Smoker   Smokeless Tobacco     Never Used       Review of systems otherwise negative except as listed in HPI.   History   Smoking Status     Never Smoker   Smokeless Tobacco     Never Used       OBJECTICE: /76 (BP Location: Left arm, Patient Position: Sitting, Cuff Size: Adult Regular)   Pulse 60   Temp 97.6  F (36.4  C) (Oral)   Resp 16   Ht 1.626 m (5' 4\")   Wt 81 kg (178 lb 8 oz)   LMP 03/13/2022 (Approximate)   BMI 30.64 kg/m      DATA REVIEWED:  Additional " History from Old Records Summarized (2):   Radiology Tests Summarized or Ordered (1) X ray Lumbar spine  Labs Reviewed or Ordered (1) Hep B viral load      GEN-alert,  in no apparent distress.  HEENT- neck is supple.  CV-regular rate and rhythm with no murmur.   RESP-lungs clear to auscultation .  ABDOMEN- Soft , not tender.  BACK- No significant tenderness today.  SKIN-no jaundice        Tk Jay MD   4/5/2022

## 2022-04-19 ENCOUNTER — TRANSFERRED RECORDS (OUTPATIENT)
Dept: HEALTH INFORMATION MANAGEMENT | Facility: CLINIC | Age: 42
End: 2022-04-19
Payer: MEDICARE

## 2022-04-26 ENCOUNTER — TRANSFERRED RECORDS (OUTPATIENT)
Dept: HEALTH INFORMATION MANAGEMENT | Facility: CLINIC | Age: 42
End: 2022-04-26
Payer: MEDICARE

## 2022-06-06 NOTE — PROGRESS NOTES
ASSESMENT AND PLAN:  {Assess/Plan SmartLinks:19743}      SUBJECTIVE: Beh Chandler    No past medical history on file.  Patient Active Problem List   Diagnosis     Chronic midline low back pain without sciatica     Chronic viral hepatitis B without delta agent and without coma (H)     Gastric carcinoma (H)       Allergies:  No Known Allergies    History   Smoking Status     Never Smoker   Smokeless Tobacco     Never Used       Review of systems otherwise negative except as listed in HPI.   History   Smoking Status     Never Smoker   Smokeless Tobacco     Never Used       OBJECTICE: There were no vitals taken for this visit.    DATA REVIEWED:  Additional History from Old Records Summarized (2): ***  Decision to Obtain Records (1): ***  Radiology Tests Summarized or Ordered (1): ***  Labs Reviewed or Ordered (1): ***      GEN-alert,  in no apparent distress.  HEENT-mucous membranes are moist, neck is supple.  CV-regular rate and rhythm with no murmur.   RESP-lungs clear to auscultation .  ABDOMEN- Soft , not tender.  EXTREM- No edema.  SKIN-normal        Tk Jay MD   6/7/2022

## 2022-06-07 ENCOUNTER — OFFICE VISIT (OUTPATIENT)
Dept: FAMILY MEDICINE | Facility: CLINIC | Age: 42
End: 2022-06-07
Payer: MEDICARE

## 2022-06-07 VITALS
HEIGHT: 64 IN | BODY MASS INDEX: 28.68 KG/M2 | HEART RATE: 74 BPM | DIASTOLIC BLOOD PRESSURE: 78 MMHG | TEMPERATURE: 97.8 F | RESPIRATION RATE: 16 BRPM | WEIGHT: 168 LBS | OXYGEN SATURATION: 98 % | SYSTOLIC BLOOD PRESSURE: 106 MMHG

## 2022-06-07 DIAGNOSIS — Z00.00 ROUTINE GENERAL MEDICAL EXAMINATION AT A HEALTH CARE FACILITY: Primary | ICD-10-CM

## 2022-06-07 DIAGNOSIS — G89.29 CHRONIC MIDLINE LOW BACK PAIN WITHOUT SCIATICA: ICD-10-CM

## 2022-06-07 DIAGNOSIS — E11.9 TYPE 2 DIABETES MELLITUS TREATED WITHOUT INSULIN (H): Primary | ICD-10-CM

## 2022-06-07 DIAGNOSIS — B18.1 CHRONIC VIRAL HEPATITIS B WITHOUT DELTA AGENT AND WITHOUT COMA (H): ICD-10-CM

## 2022-06-07 DIAGNOSIS — C16.9 GASTRIC CARCINOMA (H): ICD-10-CM

## 2022-06-07 DIAGNOSIS — K59.00 CONSTIPATION, UNSPECIFIED CONSTIPATION TYPE: ICD-10-CM

## 2022-06-07 DIAGNOSIS — Z23 HIGH PRIORITY FOR 2019-NCOV VACCINE: ICD-10-CM

## 2022-06-07 DIAGNOSIS — M54.50 CHRONIC MIDLINE LOW BACK PAIN WITHOUT SCIATICA: ICD-10-CM

## 2022-06-07 DIAGNOSIS — Z13.1 SCREENING FOR DIABETES MELLITUS: ICD-10-CM

## 2022-06-07 DIAGNOSIS — Z13.220 SCREENING FOR LIPID DISORDERS: ICD-10-CM

## 2022-06-07 LAB
ALBUMIN SERPL-MCNC: 3.5 G/DL (ref 3.5–5)
ALP SERPL-CCNC: 99 U/L (ref 45–120)
ALT SERPL W P-5'-P-CCNC: 26 U/L (ref 0–45)
ANION GAP SERPL CALCULATED.3IONS-SCNC: 9 MMOL/L (ref 5–18)
AST SERPL W P-5'-P-CCNC: 21 U/L (ref 0–40)
BILIRUB SERPL-MCNC: 0.4 MG/DL (ref 0–1)
BUN SERPL-MCNC: 9 MG/DL (ref 8–22)
CALCIUM SERPL-MCNC: 8.4 MG/DL (ref 8.5–10.5)
CHLORIDE BLD-SCNC: 111 MMOL/L (ref 98–107)
CHOLEST SERPL-MCNC: 135 MG/DL
CO2 SERPL-SCNC: 22 MMOL/L (ref 22–31)
CREAT SERPL-MCNC: 0.65 MG/DL (ref 0.6–1.1)
FASTING STATUS PATIENT QL REPORTED: ABNORMAL
GFR SERPL CREATININE-BSD FRML MDRD: >90 ML/MIN/1.73M2
GLUCOSE BLD-MCNC: 74 MG/DL (ref 70–125)
HBA1C MFR BLD: 8.1 % (ref 0–5.6)
HDLC SERPL-MCNC: 38 MG/DL
LDLC SERPL CALC-MCNC: 78 MG/DL
POTASSIUM BLD-SCNC: 3.8 MMOL/L (ref 3.5–5)
PROT SERPL-MCNC: 6.7 G/DL (ref 6–8)
SODIUM SERPL-SCNC: 142 MMOL/L (ref 136–145)
TRIGL SERPL-MCNC: 97 MG/DL

## 2022-06-07 PROCEDURE — 36415 COLL VENOUS BLD VENIPUNCTURE: CPT | Performed by: FAMILY MEDICINE

## 2022-06-07 PROCEDURE — 80061 LIPID PANEL: CPT | Performed by: FAMILY MEDICINE

## 2022-06-07 PROCEDURE — 91305 COVID-19,PF,PFIZER (12+ YRS): CPT | Performed by: FAMILY MEDICINE

## 2022-06-07 PROCEDURE — 83036 HEMOGLOBIN GLYCOSYLATED A1C: CPT | Performed by: FAMILY MEDICINE

## 2022-06-07 PROCEDURE — 99396 PREV VISIT EST AGE 40-64: CPT | Mod: 25 | Performed by: FAMILY MEDICINE

## 2022-06-07 PROCEDURE — 80053 COMPREHEN METABOLIC PANEL: CPT | Performed by: FAMILY MEDICINE

## 2022-06-07 PROCEDURE — 0054A COVID-19,PF,PFIZER (12+ YRS): CPT | Performed by: FAMILY MEDICINE

## 2022-06-07 RX ORDER — GABAPENTIN 300 MG/1
300 CAPSULE ORAL 2 TIMES DAILY
Qty: 60 CAPSULE | Refills: 3 | Status: SHIPPED | OUTPATIENT
Start: 2022-06-07 | End: 2022-10-10

## 2022-06-07 RX ORDER — GLUCOSAMINE HCL/CHONDROITIN SU 500-400 MG
CAPSULE ORAL
Qty: 100 EACH | Refills: 3 | Status: SHIPPED | OUTPATIENT
Start: 2022-06-07 | End: 2023-09-14

## 2022-06-07 RX ORDER — METFORMIN HCL 500 MG
500 TABLET, EXTENDED RELEASE 24 HR ORAL
Qty: 90 TABLET | Refills: 1 | Status: SHIPPED | OUTPATIENT
Start: 2022-06-07 | End: 2022-11-11

## 2022-06-07 RX ORDER — DOCUSATE SODIUM 100 MG/1
100 CAPSULE, LIQUID FILLED ORAL 2 TIMES DAILY PRN
Qty: 180 CAPSULE | Refills: 1 | Status: SHIPPED | OUTPATIENT
Start: 2022-06-07 | End: 2023-02-14

## 2022-06-07 RX ORDER — LANCETS
EACH MISCELLANEOUS
Qty: 200 EACH | Refills: 11 | Status: SHIPPED | OUTPATIENT
Start: 2022-06-07 | End: 2023-02-14

## 2022-06-07 ASSESSMENT — ENCOUNTER SYMPTOMS
WEAKNESS: 0
HEARTBURN: 1
HEMATURIA: 0
NAUSEA: 0
CONSTIPATION: 1
BREAST MASS: 0
DYSURIA: 0
HEMATOCHEZIA: 0
NERVOUS/ANXIOUS: 0
ARTHRALGIAS: 0
DIZZINESS: 0
SHORTNESS OF BREATH: 0
DIARRHEA: 0
PALPITATIONS: 0
ABDOMINAL PAIN: 0
SORE THROAT: 0
HEADACHES: 0
COUGH: 0
CHILLS: 0
FEVER: 0
MYALGIAS: 0
EYE PAIN: 0
FREQUENCY: 0
JOINT SWELLING: 0
PARESTHESIAS: 0

## 2022-06-07 NOTE — PROGRESS NOTES
SUBJECTIVE:   CC: Beh Meh is an 42 year old woman who presents for preventive health visit.   Here for physical.  Gastric cancer- On remission, followed by Oncology. Last seen by Oncology in 4/22, f//u in 6 months per note.  Hep B- Low viral load.  Back pain- Flexeril makes her too drowsy.  Pain is improving but still having burning sensation.  She was prescribed gabapentin, unclear if she picked it up.  No tingling and numbness in the lower extremities.  Constipation-MiraLAX and probiotic prescribed by oncology.  States these medications does not help.  Having 1 bowel movement every 2 to 3 days, very hard stool.  No blood in the stool.      Today's PHQ-2 Score:   PHQ-2 ( 1999 Pf[employer]) 6/7/2022   Q1: Little interest or pleasure in doing things 0   Q2: Feeling down, depressed or hopeless 0   PHQ-2 Score 0   PHQ-2 Total Score (12-17 Years)- Positive if 3 or more points; Administer PHQ-A if positive -   Q1: Little interest or pleasure in doing things Not at all   Q2: Feeling down, depressed or hopeless Not at all   PHQ-2 Score 0       Abuse: Current or Past (Physical, Sexual or Emotional) - No  Do you feel safe in your environment? Yes      Social History     Tobacco Use     Smoking status: Never Smoker     Smokeless tobacco: Never Used   Substance Use Topics     Alcohol use: No         Alcohol Use 6/7/2022   Prescreen: >3 drinks/day or >7 drinks/week? No         Breast Cancer Screening:    Breast CA Risk Assessment (FHS-7) 6/7/2022   Do you have a family history of breast, colon, or ovarian cancer? No / Unknown         Mammogram Screening - Offered annual screening and updated Health Maintenance for mutual plan based on risk factor consideration    Pertinent mammograms are reviewed under the imaging tab.    History of abnormal Pap smear: NO - age 30-65 PAP every 5 years with negative HPV co-testing recommended  PAP / HPV Latest Ref Rng & Units 6/3/2021   PAP Negative for squamous intraepithelial lesion or  "malignancy. Negative for squamous intraepithelial lesion or malignancy  Electronically signed by Keya Miranda CT (ASCP) on 6/9/2021 at  4:19 PM     HPV16 NEG Negative   HPV18 NEG Negative   HRHPV NEG Negative     Reviewed and updated as needed this visit by clinical staff   Tobacco  Allergies  Meds   Med Hx  Surg Hx  Fam Hx  Soc Hx          Reviewed and updated as needed this visit by Provider                     Review of Systems   Constitutional: Negative for chills and fever.   HENT: Negative for congestion, ear pain, hearing loss and sore throat.    Eyes: Negative for pain and visual disturbance.   Respiratory: Negative for cough and shortness of breath.    Cardiovascular: Negative for chest pain, palpitations and peripheral edema.   Gastrointestinal: Positive for constipation and heartburn. Negative for abdominal pain, diarrhea, hematochezia and nausea.   Breasts:  Negative for tenderness, breast mass and discharge.   Genitourinary: Negative for dysuria, frequency, genital sores, hematuria, pelvic pain, urgency, vaginal bleeding and vaginal discharge.   Musculoskeletal: Negative for arthralgias, joint swelling and myalgias.   Skin: Negative for rash.   Neurological: Negative for dizziness, weakness, headaches and paresthesias.   Psychiatric/Behavioral: Negative for mood changes. The patient is not nervous/anxious.         OBJECTIVE:   /78 (BP Location: Left arm, Patient Position: Sitting, Cuff Size: Adult Regular)   Pulse 74   Temp 97.8  F (36.6  C) (Oral)   Resp 16   Ht 1.626 m (5' 4\")   Wt 76.2 kg (168 lb)   LMP 05/10/2022 (Exact Date)   SpO2 98%   BMI 28.84 kg/m    Physical Exam  GENERAL: healthy, alert and no distress  NECK: no adenopathy, no asymmetry, masses, or scars and thyroid normal to palpation  RESP: lungs clear to auscultation - no rales, rhonchi or wheezes  CV: regular rate and rhythm, normal S1 S2, no S3 or S4, no murmur, click or rub, no peripheral edema and " "peripheral pulses strong  ABDOMEN: soft, nontender, no hepatosplenomegaly, no masses and bowel sounds normal  MS: no gross musculoskeletal defects noted, no edema        ASSESSMENT/PLAN:     Routine general medical examination at a health care facility  - Comprehensive metabolic panel (BMP + Alb, Alk Phos, ALT, AST, Total. Bili, TP)    Chronic midline low back pain without sciatica  - gabapentin (NEURONTIN) 300 MG capsule; Take 1 capsule (300 mg) by mouth 2 times daily    High priority for 2019-nCoV vaccine  Covid vaccine booster today    Gastric carcinoma (H)  Closely followed by Oncology.    Chronic viral hepatitis B without delta agent and without coma (H)  Low viral load. Recheck in a few months     Constipation, unspecified constipation type  - docusate sodium (COLACE) 100 MG capsule; Take 1 capsule (100 mg) by mouth 2 times daily as needed for constipation    Screening for diabetes mellitus  - Hemoglobin A1c    Screening for lipid disorders  - Lipid panel      COUNSELING:  Reviewed preventive health counseling, as reflected in patient instructions       Regular exercise       Healthy diet/nutrition    Estimated body mass index is 28.84 kg/m  as calculated from the following:    Height as of this encounter: 1.626 m (5' 4\").    Weight as of this encounter: 76.2 kg (168 lb).        She reports that she has never smoked. She has never used smokeless tobacco.      Counseling Resources:  ATP IV Guidelines  Pooled Cohorts Equation Calculator  Breast Cancer Risk Calculator  BRCA-Related Cancer Risk Assessment: FHS-7 Tool  FRAX Risk Assessment  ICSI Preventive Guidelines  Dietary Guidelines for Americans, 2010  [employer] MyPlate  ASA Prophylaxis  Lung CA Screening    Tk Jay MD  [facility]  Answers for HPI/ROS submitted by the patient on 6/7/2022  Frequency of exercise:: 2-3 days/week  Getting at least 3 servings of Calcium per day:: Yes  Diet:: Regular (no restrictions)  Taking medications " regularly:: Yes  Medication side effects:: None  Bi-annual eye exam:: NO  Dental care twice a year:: NO  Sleep apnea or symptoms of sleep apnea:: None  Additional concerns today:: Yes  Duration of exercise:: Greater than 60 minutes

## 2022-06-17 ENCOUNTER — OFFICE VISIT (OUTPATIENT)
Dept: FAMILY MEDICINE | Facility: CLINIC | Age: 42
End: 2022-06-17
Payer: MEDICARE

## 2022-06-17 VITALS
WEIGHT: 173.44 LBS | BODY MASS INDEX: 29.61 KG/M2 | TEMPERATURE: 97.8 F | HEIGHT: 64 IN | OXYGEN SATURATION: 97 % | DIASTOLIC BLOOD PRESSURE: 72 MMHG | HEART RATE: 61 BPM | SYSTOLIC BLOOD PRESSURE: 108 MMHG

## 2022-06-17 DIAGNOSIS — G89.29 CHRONIC MIDLINE LOW BACK PAIN WITHOUT SCIATICA: ICD-10-CM

## 2022-06-17 DIAGNOSIS — E11.9 TYPE 2 DIABETES MELLITUS TREATED WITHOUT INSULIN (H): Primary | ICD-10-CM

## 2022-06-17 DIAGNOSIS — C16.9 GASTRIC CARCINOMA (H): ICD-10-CM

## 2022-06-17 DIAGNOSIS — B18.1 CHRONIC VIRAL HEPATITIS B WITHOUT DELTA AGENT AND WITHOUT COMA (H): ICD-10-CM

## 2022-06-17 DIAGNOSIS — M54.50 CHRONIC MIDLINE LOW BACK PAIN WITHOUT SCIATICA: ICD-10-CM

## 2022-06-17 LAB
CREAT UR-MCNC: 211 MG/DL
MICROALBUMIN UR-MCNC: 1.34 MG/DL (ref 0–1.99)
MICROALBUMIN/CREAT UR: 6.4 MG/G CR

## 2022-06-17 PROCEDURE — 82043 UR ALBUMIN QUANTITATIVE: CPT | Performed by: FAMILY MEDICINE

## 2022-06-17 PROCEDURE — 99214 OFFICE O/P EST MOD 30 MIN: CPT | Performed by: FAMILY MEDICINE

## 2022-06-17 NOTE — PROGRESS NOTES
"  Assessment & Plan   Type 2 diabetes mellitus treated without insulin (H)  Newly diagnosed.  Latest A1c 8.1 .  On metformin  mg daily, tolerating well.  Will continue metformin current dose, recheck A1c in 3 months.  - Albumin Random Urine Quantitative with Creat Ratio; Future  - OPTOMETRY REFERRAL; Future    Chronic midline low back pain without sciatica  Stable.  No acute symptoms today.    Chronic viral hepatitis B without delta agent and without coma (H)  Low viral load 6 months ago.  Recheck in a few months.  Referral to GI if indicated.    Gastric carcinoma (H)  Closely followed by oncology.    [id number]     BMI:   Estimated body mass index is 29.77 kg/m  as calculated from the following:    Height as of this encounter: 1.626 m (5' 4\").    Weight as of this encounter: 78.7 kg (173 lb 7 oz).       No follow-ups on file.    Tk Jay MD  [facility] [place] Subjective Beh is a 42 year old here for diabetes follow-up.  Most recent A1c was 8.0.   Started on metformin  mg daily.  She is tolerating the medication well.  No side effects reported.  Her son is helping her check blood sugar once or twice a day, the numbers are usually between  in the recent days.  No low blood sugar with hypoglycemic symptoms reported.  Neck pain is stable.  No acute worsening pain.  Oncology is following her for gastric cancer.  Hep B viral load was very low 6 months ago.  No acute jaundice or fatigue.    Diabetes      History of Present Illness       Diabetes:   She presents for follow up of diabetes.  She is checking home blood glucose two times daily. She checks blood glucose before and after meals.  Blood glucose is never over 200 and never under 70. When her blood glucose is low, the patient is asymptomatic for confusion, blurred vision, lethargy and reports not feeling dizzy, shaky, or weak.  She is concerned about other. She is not experiencing numbness or burning in feet, excessive thirst, " blurry vision, weight changes or redness, sores or blisters on feet. The patient has not had a diabetic eye exam in the last 12 months.                   Review of Systems   No acute fever or URI symptoms.  No chest pain, shortness of breath or palpitations.      Objective    LMP 05/10/2022 (Exact Date)   There is no height or weight on file to calculate BMI.       Physical Exam   GENERAL: healthy, alert and no distress  NECK: no adenopathy, no asymmetry, masses, or scars and thyroid normal to palpation  RESP: lungs clear to auscultation - no rales, rhonchi or wheezes  CV: regular rate and rhythm, normal S1 S2, no S3 or S4, no murmur, click or rub, no peripheral edema and peripheral pulses strong  ABDOMEN: soft, nontender, no hepatosplenomegaly, no masses and bowel sounds normal  EXT-no open lesions or ulcers.  Sensation intact.                .  ..

## 2022-07-07 DIAGNOSIS — E11.9 TYPE 2 DIABETES MELLITUS TREATED WITHOUT INSULIN (H): ICD-10-CM

## 2022-07-09 RX ORDER — BLOOD-GLUCOSE METER
EACH MISCELLANEOUS
Qty: 1 KIT | Refills: 0 | Status: SHIPPED | OUTPATIENT
Start: 2022-07-09

## 2022-07-09 NOTE — TELEPHONE ENCOUNTER
"Last Written Prescription Date:  6/7/22  Last Fill Quantity: 1,  # refills: 0   Last office visit provider:  6/17/22     Requested Prescriptions   Pending Prescriptions Disp Refills     Blood Glucose Monitoring Suppl (ACCU-CHEK GUIDE ME) w/Device KIT [Pharmacy Med Name: ACCU-CHEK GUIDE ME W/DEVICE W/DEVICE Kit] 1 kit 0     Sig: USE TO TEST BLOOD SUGAR TWO TIMES DAILY OR AS DIRECTED. PREFERRED BLOOD GLUCOSE METER OR SUPPLIES TO ACCOMPANY: BLOOD GLUCOSE MONITOR BRAN<MORE>       Diabetic Supplies Protocol Passed - 7/7/2022 11:57 AM        Passed - Medication is active on med list        Passed - Patient is 18 years of age or older        Passed - Recent (6 mo) or future (30 days) visit within the authorizing provider's specialty     Patient had office visit in the last 6 months or has a visit in the next 30 days with authorizing provider.  See \"Patient Info\" tab in inbasket, or \"Choose Columns\" in Meds & Orders section of the refill encounter.                 Josselyn Bishop RN 07/09/22 6:42 AM  "

## 2022-07-12 ENCOUNTER — NURSE TRIAGE (OUTPATIENT)
Dept: NURSING | Facility: CLINIC | Age: 42
End: 2022-07-12

## 2022-07-12 DIAGNOSIS — E11.9 TYPE 2 DIABETES MELLITUS TREATED WITHOUT INSULIN (H): ICD-10-CM

## 2022-07-13 NOTE — TELEPHONE ENCOUNTER
Patient gave permission to speak to daughter.  Patient needs to reorder the testing strips.        Last Written Prescription Date:  6/7/2022  Last Fill Quantity: 200,  # refills: 11   Last office visit provider:  6/17/2022     Requested Prescriptions   Pending Prescriptions Disp Refills     blood glucose (NO BRAND SPECIFIED) test strip 200 strip 11     Sig: Use to test blood sugar 2  times daily or as directed. To accompany: Blood Glucose Monitor Brands: per insurance.       There is no refill protocol information for this order          Jennifer Freeman RN 07/12/22 7:24 PM    Reason for Disposition    Caller requesting a refill, no triage required, and triager able to refill per unit policy    Additional Information    Negative: Drug overdose and triager unable to answer question    Negative: Caller requesting information unrelated to medicine    Negative: Caller requesting a prescription for Strep throat and has a positive culture result    Negative: Rash while taking a medication or within 3 days of stopping it    Negative: Immunization reaction suspected    Negative: [1] Asthma and [2] having symptoms of asthma (cough, wheezing, etc.)    Negative: [1] Influenza symptoms AND [2] anti-viral med prescription request, such as Tamiflu    Negative: [1] Symptom of illness (e.g., headache, abdominal pain, earache, vomiting) AND [2] more than mild    Negative: MORE THAN A DOUBLE DOSE of a prescription or over-the-counter (OTC) drug    Negative: [1] DOUBLE DOSE (an extra dose or lesser amount) of over-the-counter (OTC) drug AND [2] any symptoms (e.g., dizziness, nausea, pain, sleepiness)    Negative: [1] DOUBLE DOSE (an extra dose or lesser amount) of prescription drug AND [2] any symptoms (e.g., dizziness, nausea, pain, sleepiness)    Negative: Took another person's prescription drug    Negative: [1] DOUBLE DOSE (an extra dose or lesser amount) of prescription drug AND [2] NO symptoms (Exception: a double dose of  "antibiotics)    Negative: Diabetes drug error or overdose (e.g., took wrong type of insulin or took extra dose)    Negative: [1] Request for URGENT new prescription or refill of \"essential\" medication (i.e., likelihood of harm to patient if not taken) AND [2] triager unable to fill per unit policy    Negative: [1] Prescription not at pharmacy AND [2] was prescribed by PCP recently    Negative: [1] Pharmacy calling with prescription questions AND [2] triager unable to answer question    Negative: [1] Caller has URGENT medication question about med that PCP or specialist prescribed AND [2] triager unable to answer question    Negative: [1] Caller has NON-URGENT medication question about med that PCP prescribed AND [2] triager unable to answer question    Negative: [1] Caller requesting a NON-URGENT new prescription or refill AND [2] triager unable to refill per unit policy    Negative: [1] Caller has medication question about med not prescribed by PCP AND [2] triager unable to answer question (e.g., compatibility with other med, storage)    Negative: Caller requesting a CONTROLLED substance prescription refill (e.g., narcotics, ADHD medicines)    Negative: Caller wants to use a complementary or alternative medicine    Negative: [1] Prescription prescribed recently is not at pharmacy AND [2] triager has access to patient's EMR AND [3] prescription is recorded in the EMR    Negative: [1] DOUBLE DOSE (an extra dose or lesser amount) of over-the-counter (OTC) drug AND [2] NO symptoms    Negative: [1] DOUBLE DOSE (an extra dose or lesser amount) of antibiotic drug AND [2] NO symptoms    Negative: Caller has medication question only, adult not sick, and triager answers question    Negative: Caller has medication question, adult has minor symptoms, caller declines triage, AND triager answers question    Negative: Caller requesting information about medication during pregnancy; adult is not ill AND triager answers question    " Negative: Caller requesting information about medication use with breastfeeding; neither adult nor infant is ill, triager answers question    Protocols used: MEDICATION QUESTION CALL-A-AH

## 2022-07-14 ENCOUNTER — TRANSFERRED RECORDS (OUTPATIENT)
Dept: HEALTH INFORMATION MANAGEMENT | Facility: CLINIC | Age: 42
End: 2022-07-14

## 2022-07-14 ENCOUNTER — MEDICAL CORRESPONDENCE (OUTPATIENT)
Dept: HEALTH INFORMATION MANAGEMENT | Facility: CLINIC | Age: 42
End: 2022-07-14

## 2022-07-14 LAB — RETINOPATHY: NEGATIVE

## 2022-08-09 ENCOUNTER — OFFICE VISIT (OUTPATIENT)
Dept: FAMILY MEDICINE | Facility: CLINIC | Age: 42
End: 2022-08-09
Payer: MEDICARE

## 2022-08-09 VITALS
WEIGHT: 167.75 LBS | RESPIRATION RATE: 16 BRPM | OXYGEN SATURATION: 98 % | TEMPERATURE: 98.1 F | HEIGHT: 64 IN | SYSTOLIC BLOOD PRESSURE: 100 MMHG | HEART RATE: 60 BPM | DIASTOLIC BLOOD PRESSURE: 68 MMHG | BODY MASS INDEX: 28.64 KG/M2

## 2022-08-09 DIAGNOSIS — B18.1 CHRONIC VIRAL HEPATITIS B WITHOUT DELTA AGENT AND WITHOUT COMA (H): ICD-10-CM

## 2022-08-09 DIAGNOSIS — E11.9 TYPE 2 DIABETES MELLITUS TREATED WITHOUT INSULIN (H): Primary | ICD-10-CM

## 2022-08-09 DIAGNOSIS — M54.50 CHRONIC MIDLINE LOW BACK PAIN WITHOUT SCIATICA: ICD-10-CM

## 2022-08-09 DIAGNOSIS — C16.9 GASTRIC CARCINOMA (H): ICD-10-CM

## 2022-08-09 DIAGNOSIS — G89.29 CHRONIC MIDLINE LOW BACK PAIN WITHOUT SCIATICA: ICD-10-CM

## 2022-08-09 DIAGNOSIS — K59.00 CONSTIPATION, UNSPECIFIED CONSTIPATION TYPE: ICD-10-CM

## 2022-08-09 PROCEDURE — 90471 IMMUNIZATION ADMIN: CPT | Performed by: FAMILY MEDICINE

## 2022-08-09 PROCEDURE — 99214 OFFICE O/P EST MOD 30 MIN: CPT | Mod: 25 | Performed by: FAMILY MEDICINE

## 2022-08-09 PROCEDURE — 90677 PCV20 VACCINE IM: CPT | Performed by: FAMILY MEDICINE

## 2022-08-09 NOTE — PROGRESS NOTES
"  Type 2 diabetes mellitus treated without insulin (H)  Continue metformin  mg daily.  Recheck A1c at next visit.    Chronic midline low back pain without sciatica  Improving.  Continue gabapentin.    Chronic viral hepatitis B without delta agent and without coma (H)  Last viral load was low.  Recheck at next visit.    Gastric carcinoma (H)  Closely followed by oncology.    Constipation, unspecified constipation type  Discussed high-fiber diet and increase daily water intake.  Continue Colace as needed.    Subjective   Beh is a 42 year old, presenting for the following health issues:  Follow Up (Back pain)  Back pain is improving.  Ran out of gabapentin yesterday.  She does not know she has refills.  Fasting blood sugar are in the 90s to low 100s most days.  No low blood sugar with hypoglycemic symptoms.  No medication side effects from metformin.  She has hepatitis B but very low viral load.  No fatigue, jaundice or abdominal pain reported.  No follow-up appointment with oncology yet.  States they will call when she is due to follow-up.    Review of Systems   CONSTITUTIONAL: NEGATIVE for fever, chills, change in weight  ENT/MOUTH: NEGATIVE for ear, mouth and throat problems  RESP: NEGATIVE for significant cough or SOB  CV: NEGATIVE for chest pain, palpitations or peripheral edema      Objective    /68 (BP Location: Right arm, Patient Position: Sitting, Cuff Size: Adult Regular)   Pulse 60   Temp 98.1  F (36.7  C) (Oral)   Resp 16   Ht 1.626 m (5' 4\")   Wt 76.1 kg (167 lb 12 oz)   LMP 07/21/2022 (Approximate)   SpO2 98%   BMI 28.79 kg/m    Body mass index is 28.79 kg/m .  Physical Exam   GENERAL: healthy, alert and no distress  NECK: no adenopathy, no asymmetry, masses, or scars and thyroid normal to palpation  RESP: lungs clear to auscultation - no rales, rhonchi or wheezes  CV: regular rate and rhythm, normal S1 S2, no S3 or S4, no murmur, click or rub, no peripheral edema and peripheral pulses " strong  ABDOMEN: soft, nontender, no hepatosplenomegaly, no masses and bowel sounds normal  MS: BACK- No significant tenderness today.  Negative straight leg raising test.                .  ..  Answers for HPI/ROS submitted by the patient on 8/9/2022  Your back pain is: chronic  Where is your back pain located? : right lower back, left lower back  How would you describe your back pain? : burning, dull ache, other  Where does your back pain spread? : nowhere, right shoulder, left shoulder, right side of neck, left side of neck  Since you noticed your back pain, how has it changed? : gradually improving  Does your back pain interfere with your job?: Yes  How many days per week do you miss taking your medication?: 0

## 2022-10-09 DIAGNOSIS — G89.29 CHRONIC MIDLINE LOW BACK PAIN WITHOUT SCIATICA: ICD-10-CM

## 2022-10-09 DIAGNOSIS — M54.50 CHRONIC MIDLINE LOW BACK PAIN WITHOUT SCIATICA: ICD-10-CM

## 2022-10-10 RX ORDER — GABAPENTIN 300 MG/1
300 CAPSULE ORAL 2 TIMES DAILY
Qty: 60 CAPSULE | Refills: 3 | Status: SHIPPED | OUTPATIENT
Start: 2022-10-10 | End: 2022-11-11

## 2022-10-10 NOTE — TELEPHONE ENCOUNTER
Routing refill request to provider for review/approval because:  Drug not on the G refill protocol     Last Written Prescription Date:  6/7/22  Last Fill Quantity: 60,  # refills: 3   Last office visit provider:  8/9/22     Requested Prescriptions   Pending Prescriptions Disp Refills     gabapentin (NEURONTIN) 300 MG capsule [Pharmacy Med Name: GABAPENTIN 300 MG CAPS 300 Capsule] 60 capsule 3     Sig: TAKE 1 CAPSULE (300 MG) BY MOUTH 2 TIMES DAILY       There is no refill protocol information for this order          Jony Oseguera RN 10/10/22 9:32 AM

## 2022-10-24 ENCOUNTER — TRANSFERRED RECORDS (OUTPATIENT)
Dept: HEALTH INFORMATION MANAGEMENT | Facility: CLINIC | Age: 42
End: 2022-10-24

## 2022-10-28 ENCOUNTER — TRANSFERRED RECORDS (OUTPATIENT)
Dept: HEALTH INFORMATION MANAGEMENT | Facility: CLINIC | Age: 42
End: 2022-10-28

## 2022-11-11 ENCOUNTER — OFFICE VISIT (OUTPATIENT)
Dept: FAMILY MEDICINE | Facility: CLINIC | Age: 42
End: 2022-11-11
Payer: MEDICARE

## 2022-11-11 VITALS
DIASTOLIC BLOOD PRESSURE: 72 MMHG | OXYGEN SATURATION: 100 % | RESPIRATION RATE: 16 BRPM | HEART RATE: 60 BPM | HEIGHT: 64 IN | BODY MASS INDEX: 28.04 KG/M2 | SYSTOLIC BLOOD PRESSURE: 100 MMHG | WEIGHT: 164.25 LBS | TEMPERATURE: 97.8 F

## 2022-11-11 DIAGNOSIS — K59.00 CONSTIPATION, UNSPECIFIED CONSTIPATION TYPE: ICD-10-CM

## 2022-11-11 DIAGNOSIS — M54.50 CHRONIC MIDLINE LOW BACK PAIN WITHOUT SCIATICA: ICD-10-CM

## 2022-11-11 DIAGNOSIS — G89.29 CHRONIC MIDLINE LOW BACK PAIN WITHOUT SCIATICA: ICD-10-CM

## 2022-11-11 DIAGNOSIS — E11.9 TYPE 2 DIABETES MELLITUS TREATED WITHOUT INSULIN (H): Primary | ICD-10-CM

## 2022-11-11 DIAGNOSIS — C16.9 GASTRIC CARCINOMA (H): ICD-10-CM

## 2022-11-11 DIAGNOSIS — B18.1 CHRONIC VIRAL HEPATITIS B WITHOUT DELTA AGENT AND WITHOUT COMA (H): ICD-10-CM

## 2022-11-11 LAB — HBA1C MFR BLD: 5.6 % (ref 0–5.6)

## 2022-11-11 PROCEDURE — 83036 HEMOGLOBIN GLYCOSYLATED A1C: CPT | Performed by: FAMILY MEDICINE

## 2022-11-11 PROCEDURE — 99214 OFFICE O/P EST MOD 30 MIN: CPT | Performed by: FAMILY MEDICINE

## 2022-11-11 PROCEDURE — 0124A COVID-19,PF,PFIZER BOOSTER BIVALENT: CPT | Performed by: FAMILY MEDICINE

## 2022-11-11 PROCEDURE — 91312 COVID-19,PF,PFIZER BOOSTER BIVALENT: CPT | Performed by: FAMILY MEDICINE

## 2022-11-11 PROCEDURE — 36415 COLL VENOUS BLD VENIPUNCTURE: CPT | Performed by: FAMILY MEDICINE

## 2022-11-11 RX ORDER — METFORMIN HCL 500 MG
500 TABLET, EXTENDED RELEASE 24 HR ORAL
Qty: 90 TABLET | Refills: 1 | Status: SHIPPED | OUTPATIENT
Start: 2022-11-11 | End: 2023-02-14

## 2022-11-11 NOTE — PROGRESS NOTES
"  Type 2 diabetes mellitus treated without insulin (H)  A1c 5.6 today.  It was 8.1, 5 months ago.  No medication changes.  - HEMOGLOBIN A1C; Future  - HEMOGLOBIN A1C    Gastric carcinoma (H)  Closely followed by oncology.  Completed treatment.    Chronic viral hepatitis B without delta agent and without coma (H)  Last hep B DNA quant was low.  We will recheck at next visit.    Chronic midline low back pain without sciatica  Improving.  Discontinue gabapentin.  She will take Tylenol as needed.    Constipation, unspecified constipation type  MiraLAX as needed.  Advised to increase fluid and fiber intake    Subjective   Beh is a 42 year old female here to follow-up on diabetes.  She is currently taking metformin  mg daily.  She checks fasting blood sugar multiple times a week but not daily.  Her home fasting blood sugar readings are in the 80s to 90s most tapes.  No low blood sugar with hypoglycemic symptoms.  She has been closely followed by oncology for gastric cancer, completed treatment.  No acute symptoms.  Taking MiraLAX as needed for constipation.  Has not been taking Colace.  She ran out of tramadol for few months.  Back pain is stable, no worsening pain since last office visit.  No tingling or numbness in the lower extremities.          Review of Systems   CONSTITUTIONAL: NEGATIVE for fever, chills, change in weight  RESP: NEGATIVE for significant cough or SOB  CV: NEGATIVE for chest pain, palpitations or peripheral edema      Objective    /72 (BP Location: Left arm, Patient Position: Sitting, Cuff Size: Adult Regular)   Pulse 60   Temp 97.8  F (36.6  C) (Oral)   Resp 16   Ht 1.626 m (5' 4\")   Wt 74.5 kg (164 lb 4 oz)   LMP 10/30/2022 (Exact Date)   SpO2 100%   BMI 28.19 kg/m    Body mass index is 28.19 kg/m .  Physical Exam   GENERAL: healthy, alert and no distress  NECK: no adenopathy, no asymmetry, masses, or scars and thyroid normal to palpation  RESP: lungs clear to auscultation - no " rales, rhonchi or wheezes  CV: regular rate and rhythm, normal S1 S2, no S3 or S4, no murmur, click or rub, no peripheral edema and peripheral pulses strong  ABDOMEN: soft, nontender, no hepatosplenomegaly, no masses and bowel sounds normal  MS: no gross musculoskeletal defects noted, no edema  BACK: no CVA tenderness, no paralumbar tenderness                    Answers for HPI/ROS submitted by the patient on 11/11/2022  Frequency of checking blood sugars:: one time daily  What time of day are you checking your blood sugars : before meals  Have you had any blood sugars above 200?: No  Have you had any blood sugars below 70?: No  Hypoglycemia symptoms:: other  Diabetic concerns:: none  Paraesthesia present:: none of these symptoms

## 2023-02-14 ENCOUNTER — OFFICE VISIT (OUTPATIENT)
Dept: FAMILY MEDICINE | Facility: CLINIC | Age: 43
End: 2023-02-14
Payer: COMMERCIAL

## 2023-02-14 VITALS
HEIGHT: 64 IN | BODY MASS INDEX: 25.95 KG/M2 | TEMPERATURE: 97.9 F | DIASTOLIC BLOOD PRESSURE: 68 MMHG | SYSTOLIC BLOOD PRESSURE: 108 MMHG | RESPIRATION RATE: 16 BRPM | HEART RATE: 60 BPM | OXYGEN SATURATION: 97 % | WEIGHT: 152 LBS

## 2023-02-14 DIAGNOSIS — C16.9 GASTRIC ADENOCARCINOMA (H): ICD-10-CM

## 2023-02-14 DIAGNOSIS — K59.00 CONSTIPATION, UNSPECIFIED CONSTIPATION TYPE: ICD-10-CM

## 2023-02-14 DIAGNOSIS — M54.50 CHRONIC MIDLINE LOW BACK PAIN WITHOUT SCIATICA: ICD-10-CM

## 2023-02-14 DIAGNOSIS — B18.1 CHRONIC VIRAL HEPATITIS B WITHOUT DELTA AGENT AND WITHOUT COMA (H): ICD-10-CM

## 2023-02-14 DIAGNOSIS — G89.29 CHRONIC MIDLINE LOW BACK PAIN WITHOUT SCIATICA: ICD-10-CM

## 2023-02-14 DIAGNOSIS — E11.9 TYPE 2 DIABETES MELLITUS TREATED WITHOUT INSULIN (H): Primary | ICD-10-CM

## 2023-02-14 LAB
ALBUMIN SERPL BCG-MCNC: 4.3 G/DL (ref 3.5–5.2)
ALP SERPL-CCNC: 85 U/L (ref 35–104)
ALT SERPL W P-5'-P-CCNC: 20 U/L (ref 10–35)
ANION GAP SERPL CALCULATED.3IONS-SCNC: 11 MMOL/L (ref 7–15)
AST SERPL W P-5'-P-CCNC: 26 U/L (ref 10–35)
BILIRUB SERPL-MCNC: 0.4 MG/DL
BUN SERPL-MCNC: 9.9 MG/DL (ref 6–20)
CALCIUM SERPL-MCNC: 9.7 MG/DL (ref 8.6–10)
CHLORIDE SERPL-SCNC: 106 MMOL/L (ref 98–107)
CREAT SERPL-MCNC: 0.62 MG/DL (ref 0.51–0.95)
DEPRECATED HCO3 PLAS-SCNC: 23 MMOL/L (ref 22–29)
GFR SERPL CREATININE-BSD FRML MDRD: >90 ML/MIN/1.73M2
GLUCOSE SERPL-MCNC: 90 MG/DL (ref 70–99)
HBA1C MFR BLD: 5.7 % (ref 0–5.6)
POTASSIUM SERPL-SCNC: 4.1 MMOL/L (ref 3.4–5.3)
PROT SERPL-MCNC: 7.7 G/DL (ref 6.4–8.3)
SODIUM SERPL-SCNC: 140 MMOL/L (ref 136–145)

## 2023-02-14 PROCEDURE — 87517 HEPATITIS B DNA QUANT: CPT | Performed by: FAMILY MEDICINE

## 2023-02-14 PROCEDURE — 36415 COLL VENOUS BLD VENIPUNCTURE: CPT | Performed by: FAMILY MEDICINE

## 2023-02-14 PROCEDURE — 80053 COMPREHEN METABOLIC PANEL: CPT | Performed by: FAMILY MEDICINE

## 2023-02-14 PROCEDURE — 83036 HEMOGLOBIN GLYCOSYLATED A1C: CPT | Performed by: FAMILY MEDICINE

## 2023-02-14 PROCEDURE — 99214 OFFICE O/P EST MOD 30 MIN: CPT | Performed by: FAMILY MEDICINE

## 2023-02-14 RX ORDER — METFORMIN HCL 500 MG
500 TABLET, EXTENDED RELEASE 24 HR ORAL
Qty: 90 TABLET | Refills: 1 | Status: SHIPPED | OUTPATIENT
Start: 2023-02-14 | End: 2023-06-16

## 2023-02-14 RX ORDER — LANCETS
EACH MISCELLANEOUS
Qty: 200 EACH | Refills: 11 | Status: SHIPPED | OUTPATIENT
Start: 2023-02-14

## 2023-02-14 RX ORDER — ACETAMINOPHEN 500 MG
500 TABLET ORAL EVERY 8 HOURS PRN
Qty: 90 TABLET | Refills: 1 | Status: SHIPPED | OUTPATIENT
Start: 2023-02-14

## 2023-02-14 NOTE — PROGRESS NOTES
Type 2 diabetes mellitus treated without insulin (H)    - HEMOGLOBIN A1C  - Comprehensive metabolic panel (BMP + Alb, Alk Phos, ALT, AST, Total. Bili, TP)  - metFORMIN (GLUCOPHAGE XR) 500 MG 24 hr tablet; Take 1 tablet (500 mg) by mouth daily (with dinner)  - blood glucose (NO BRAND SPECIFIED) test strip; Use to test blood sugar 2  times daily or as directed. To accompany: Blood Glucose Monitor Brands: per insurance.  - thin (NO BRAND SPECIFIED) lancets; Use with lanceting device twice a day. To accompany: Blood Glucose Monitor Brands: per insurance.    Chronic viral hepatitis B without delta agent and without coma (H)  Recheck viral load today.    Gastric adenocarcinoma (H)  Closely followed by oncology.  Last visit in October.  Recommended 6 months follow-up.      Chronic midline low back pain without sciatica  - acetaminophen (TYLENOL) 500 MG tablet; Take 1 tablet (500 mg) by mouth every 8 hours as needed for mild pain    Constipation, unspecified constipation type  Using herbal tea, working better than Colace and MiraLAX.    Subjective Beh is a 43 year old, presenting for the following health issues:  Diabetes  Fasting blood sugar are in the 90s to low 100s most days.  Out of metformin for few weeks.  No low blood sugar with hypoglycemic symptoms reported.  Back pain is improving.  Taking Tylenol only as needed, once or twice a month.  No acute back pain today.  She was prescribed MiraLAX and Colace for constipation, none of the them helped but taking herbal tea.  She stopped taking MiraLAX and Colace.    Last seen by oncology in October, recommended 6 months follow-up.  She completed treatment for gastric adenocarcinoma.    History of Present Illness       Diabetes:   She presents for follow up of diabetes.  She is checking home blood glucose a few times a week. She checks blood glucose before meals.  Blood glucose is never over 200 and never under 70. She is aware of hypoglycemia symptoms including  "dizziness. She has no concerns regarding her diabetes at this time.  She is having weight loss.             Review of Systems   CONSTITUTIONAL: NEGATIVE for fever, chills, change in weight  RESP: NEGATIVE for significant cough or SOB  CV: NEGATIVE for chest pain, palpitations or peripheral edema      Objective    /68 (BP Location: Right arm, Patient Position: Sitting, Cuff Size: Adult Regular)   Pulse 60   Temp 97.9  F (36.6  C) (Oral)   Resp 16   Ht 1.626 m (5' 4\")   Wt 68.9 kg (152 lb)   LMP 01/28/2023 (Within Days)   SpO2 97%   BMI 26.09 kg/m    Body mass index is 26.09 kg/m .  Physical Exam   GENERAL: healthy, alert and no distress  NECK: no adenopathy, no asymmetry, masses, or scars and thyroid normal to palpation  RESP: lungs clear to auscultation - no rales, rhonchi or wheezes  CV: regular rate and rhythm, normal S1 S2, no S3 or S4, no murmur, click or rub, no peripheral edema and peripheral pulses strong  ABDOMEN: soft, nontender, no hepatosplenomegaly, no masses and bowel sounds normal  MS: no gross musculoskeletal defects noted, no edema  Diabetic foot exam: normal sensory exam                "

## 2023-02-17 LAB
HBV DNA SERPL NAA+PROBE-ACNC: 28 IU/ML
HBV DNA SERPL NAA+PROBE-LOG IU: 1.4 {LOG_IU}/ML

## 2023-04-14 ENCOUNTER — TRANSFERRED RECORDS (OUTPATIENT)
Dept: HEALTH INFORMATION MANAGEMENT | Facility: CLINIC | Age: 43
End: 2023-04-14
Payer: COMMERCIAL

## 2023-04-26 ENCOUNTER — TRANSFERRED RECORDS (OUTPATIENT)
Dept: HEALTH INFORMATION MANAGEMENT | Facility: CLINIC | Age: 43
End: 2023-04-26
Payer: COMMERCIAL

## 2023-06-16 ENCOUNTER — OFFICE VISIT (OUTPATIENT)
Dept: FAMILY MEDICINE | Facility: CLINIC | Age: 43
End: 2023-06-16
Payer: MEDICARE

## 2023-06-16 ENCOUNTER — TELEPHONE (OUTPATIENT)
Dept: FAMILY MEDICINE | Facility: CLINIC | Age: 43
End: 2023-06-16

## 2023-06-16 VITALS
RESPIRATION RATE: 20 BRPM | SYSTOLIC BLOOD PRESSURE: 94 MMHG | TEMPERATURE: 97.6 F | BODY MASS INDEX: 26.14 KG/M2 | HEART RATE: 61 BPM | HEIGHT: 64 IN | WEIGHT: 153.1 LBS | OXYGEN SATURATION: 99 % | DIASTOLIC BLOOD PRESSURE: 60 MMHG

## 2023-06-16 DIAGNOSIS — C16.9 GASTRIC ADENOCARCINOMA (H): ICD-10-CM

## 2023-06-16 DIAGNOSIS — G47.00 INSOMNIA, UNSPECIFIED TYPE: ICD-10-CM

## 2023-06-16 DIAGNOSIS — E11.9 TYPE 2 DIABETES MELLITUS TREATED WITHOUT INSULIN (H): ICD-10-CM

## 2023-06-16 DIAGNOSIS — F43.21 GRIEF REACTION: ICD-10-CM

## 2023-06-16 DIAGNOSIS — Z00.00 ANNUAL PHYSICAL EXAM: Primary | ICD-10-CM

## 2023-06-16 LAB
CHOLEST SERPL-MCNC: 151 MG/DL
CREAT UR-MCNC: 53.6 MG/DL
HBA1C MFR BLD: 5.7 % (ref 0–5.6)
HDLC SERPL-MCNC: 49 MG/DL
LDLC SERPL CALC-MCNC: 88 MG/DL
MICROALBUMIN UR-MCNC: <12 MG/L
MICROALBUMIN/CREAT UR: NORMAL MG/G{CREAT}
NONHDLC SERPL-MCNC: 102 MG/DL
TRIGL SERPL-MCNC: 72 MG/DL

## 2023-06-16 PROCEDURE — 36415 COLL VENOUS BLD VENIPUNCTURE: CPT | Performed by: FAMILY MEDICINE

## 2023-06-16 PROCEDURE — 99396 PREV VISIT EST AGE 40-64: CPT | Performed by: FAMILY MEDICINE

## 2023-06-16 PROCEDURE — 82043 UR ALBUMIN QUANTITATIVE: CPT | Performed by: FAMILY MEDICINE

## 2023-06-16 PROCEDURE — 99213 OFFICE O/P EST LOW 20 MIN: CPT | Mod: 25 | Performed by: FAMILY MEDICINE

## 2023-06-16 PROCEDURE — 83036 HEMOGLOBIN GLYCOSYLATED A1C: CPT | Performed by: FAMILY MEDICINE

## 2023-06-16 PROCEDURE — 82570 ASSAY OF URINE CREATININE: CPT | Performed by: FAMILY MEDICINE

## 2023-06-16 PROCEDURE — 80061 LIPID PANEL: CPT | Performed by: FAMILY MEDICINE

## 2023-06-16 RX ORDER — METFORMIN HCL 500 MG
500 TABLET, EXTENDED RELEASE 24 HR ORAL
Qty: 90 TABLET | Refills: 1 | Status: SHIPPED | OUTPATIENT
Start: 2023-06-16

## 2023-06-16 ASSESSMENT — ACTIVITIES OF DAILY LIVING (ADL)
CURRENT_FUNCTION: MEDICATION ADMINISTRATION REQUIRES ASSISTANCE
CURRENT_FUNCTION: TELEPHONE REQUIRES ASSISTANCE
CURRENT_FUNCTION: SHOPPING REQUIRES ASSISTANCE
CURRENT_FUNCTION: TRANSPORTATION REQUIRES ASSISTANCE

## 2023-06-16 ASSESSMENT — ENCOUNTER SYMPTOMS
HEARTBURN: 0
DYSURIA: 0
FEVER: 0
DIARRHEA: 0
CHILLS: 0
HEADACHES: 0
HEMATURIA: 0
JOINT SWELLING: 0
SHORTNESS OF BREATH: 0
ARTHRALGIAS: 0
WEAKNESS: 0
PALPITATIONS: 0
NAUSEA: 0
COUGH: 0
MYALGIAS: 0
BREAST MASS: 0
ABDOMINAL PAIN: 0
DIZZINESS: 0
CONSTIPATION: 0
EYE PAIN: 0
HEMATOCHEZIA: 0
NERVOUS/ANXIOUS: 0
FREQUENCY: 0
SORE THROAT: 0
PARESTHESIAS: 0

## 2023-06-16 NOTE — TELEPHONE ENCOUNTER
Called pt and relayed lab result. Pt verbalized understanding.    Dariel Forman Cem Say, BSN RN  [facility]        ----- Message from Tk Jay MD sent at 6/16/2023  3:42 PM CDT -----  A1c is 5.7.  Diabetes seems to be in very good control.  She can hold metformin (the big white pill) until she comes back for follow-up in 3 months.  Please call the patient.    Dr. Tk Jay  6/16/2023 3:42 PM

## 2023-06-16 NOTE — PROGRESS NOTES
SUBJECTIVE:   CC: Beh is an 43 year old who presents for preventive health visit.       2023     9:06 AM   Additional Questions   Roomed by eileen love   Accompanied by Self     Healthy Habits:   PHQ-2 Total Score: 0    DM. Taking metformin  mg daily for DM. Fasting BG in the 80s- 90s.   Sister passed away in 3/23, having trouble sleeping at  Night at times. Denied SI/HI.     Stomach cancer: Saw Oncology in , labs and imaging results are stable. Recommended 6 months follow up.       Today's PHQ-2 Score:       2023     9:15 AM   PHQ-2 (  [employer])   Q1: Little interest or pleasure in doing things 0   Q2: Feeling down, depressed or hopeless 0   PHQ-2 Score 0   Q1: Little interest or pleasure in doing things Not at all   Q2: Feeling down, depressed or hopeless Not at all   PHQ-2 Score 0           Social History     Tobacco Use     Smoking status: Never     Passive exposure: Never     Smokeless tobacco: Never   Vaping Use     Vaping status: Never Used   Substance Use Topics     Alcohol use: No             2023     9:15 AM   Alcohol Use   Prescreen: >3 drinks/day or >7 drinks/week? No         Breast Cancer Screenin/7/2022     8:04 AM   Breast CA Risk Assessment (FHS-7)   Do you have a family history of breast, colon, or ovarian cancer? No / Unknown         Mammogram Screening - Offered annual screening and updated Health Maintenance for mutual plan based on risk factor consideration    Pertinent mammograms are reviewed under the imaging tab.    History of abnormal Pap smear: NO - age 30-65 PAP every 5 years with negative HPV co-testing recommended      Latest Ref Rng & Units 6/3/2021     1:59 PM   PAP / HPV   PAP Negative for squamous intraepithelial lesion or malignancy. Negative for squamous intraepithelial lesion or malignancy  Electronically signed by Keya Miranda CT (ASCP) on 2021 at  4:19 PM       HPV 16 DNA NEG Negative     HPV 18 DNA NEG Negative     Other HR  "HPV NEG Negative       Reviewed and updated as needed this visit by clinical staff   Tobacco  Allergies  Meds              Reviewed and updated as needed this visit by Provider                     Review of Systems  CONSTITUTIONAL: NEGATIVE for fever, chills, change in weight  RESP: NEGATIVE for significant cough or SOB  BREAST: NEGATIVE for masses, tenderness or discharge  CV: NEGATIVE for chest pain, palpitations or peripheral edema     OBJECTIVE:   BP 94/60 (BP Location: Left arm, Patient Position: Sitting, Cuff Size: Adult Regular)   Pulse 61   Temp 97.6  F (36.4  C) (Oral)   Resp 20   Ht 1.625 m (5' 3.98\")   Wt 69.4 kg (153 lb 1.6 oz)   LMP 04/28/2023 (Approximate)   SpO2 99%   BMI 26.30 kg/m    Physical Exam  Gen - alert, orientated, NAD  Eyes - fundascopic exam limited by the undialated pupil but looks symmetric  ENT - oropharynx clear, TMs clear  Neck - supple, no palpable mass or lymphadenopathy  CV - RRR, no murmur.  Resp - lungs CTA  Ab - soft, nontender, no palpable mass or organomegaly mass, cervix appears normal  Extrem - warm, no edema  Neuro - CN II-XII intact, strength, sensation, reflexes intact and symmetric  Skin - no rash.  No atypical appearing lesions seen.           ASSESSMENT/PLAN:     Annual physical exam    Type 2 diabetes mellitus treated without insulin (H)  A1c pending. Fasting BG < 100 most days. Will consider holding metformin after A1c result if indicated.     - metFORMIN (GLUCOPHAGE XR) 500 MG 24 hr tablet; Take 1 tablet (500 mg) by mouth daily (with dinner)  - HEMOGLOBIN A1C  - Lipid panel reflex to direct LDL Non-fasting  - Albumin Random Urine Quantitative with Creat Ratio  - Adult Eye  Referral; Future    Gastric adenocarcinoma (H)  No acute concerns.  Completed treatment.  Managed by Oncology.  Due to follow-up in 10/23.    Grief reaction  Offered counseling and medication to help her sleep but she decided to defer both for now.  Follow-up in about 6 " "weeks.    Insomnia, unspecified type  Offered medication to use as needed but patient declined.          COUNSELING:  Reviewed preventive health counseling, as reflected in patient instructions       Regular exercise       Healthy diet/nutrition      BMI:   Estimated body mass index is 26.3 kg/m  as calculated from the following:    Height as of this encounter: 1.625 m (5' 3.98\").    Weight as of this encounter: 69.4 kg (153 lb 1.6 oz).         She reports that she has never smoked. She has never been exposed to tobacco smoke. She has never used smokeless tobacco.      Tk Jay MD  [facility]  "

## 2023-07-28 ENCOUNTER — OFFICE VISIT (OUTPATIENT)
Dept: FAMILY MEDICINE | Facility: CLINIC | Age: 43
End: 2023-07-28
Attending: FAMILY MEDICINE
Payer: COMMERCIAL

## 2023-07-28 ENCOUNTER — PATIENT OUTREACH (OUTPATIENT)
Dept: CARE COORDINATION | Facility: CLINIC | Age: 43
End: 2023-07-28

## 2023-07-28 VITALS
OXYGEN SATURATION: 99 % | HEART RATE: 58 BPM | SYSTOLIC BLOOD PRESSURE: 96 MMHG | DIASTOLIC BLOOD PRESSURE: 68 MMHG | TEMPERATURE: 97.7 F | RESPIRATION RATE: 12 BRPM | WEIGHT: 152 LBS | BODY MASS INDEX: 25.95 KG/M2 | HEIGHT: 64 IN

## 2023-07-28 DIAGNOSIS — E11.9 TYPE 2 DIABETES MELLITUS TREATED WITHOUT INSULIN (H): Primary | ICD-10-CM

## 2023-07-28 DIAGNOSIS — K59.00 CONSTIPATION, UNSPECIFIED CONSTIPATION TYPE: ICD-10-CM

## 2023-07-28 DIAGNOSIS — G47.00 INSOMNIA, UNSPECIFIED TYPE: ICD-10-CM

## 2023-07-28 DIAGNOSIS — F43.21 GRIEF REACTION: ICD-10-CM

## 2023-07-28 DIAGNOSIS — M54.2 NECK PAIN: ICD-10-CM

## 2023-07-28 DIAGNOSIS — B18.1 CHRONIC VIRAL HEPATITIS B WITHOUT DELTA AGENT AND WITHOUT COMA (H): ICD-10-CM

## 2023-07-28 DIAGNOSIS — C16.9 GASTRIC ADENOCARCINOMA (H): ICD-10-CM

## 2023-07-28 PROCEDURE — 99214 OFFICE O/P EST MOD 30 MIN: CPT | Performed by: FAMILY MEDICINE

## 2023-07-28 NOTE — PROGRESS NOTES
Type 2 diabetes mellitus treated without insulin (H)  Not on medication currently but good fasting blood sugar numbers.  We will continue to monitor.  - Adult Eye  Referral; Future  - Primary Care - Care Coordination Referral; Future    Chronic viral hepatitis B without delta agent and without coma (H)  Low viral load 5 months ago.    Gastric adenocarcinoma (H)  Closely followed by oncology.    Grief reaction  Sister passed away in 3/23.  Was having trouble sleeping but now improving.  Does not want medication.  Does not want counseling.    Insomnia, unspecified type  Improving.    Neck pain  Advised to take Flexeril as needed.  Discussed potential side effects including drowsiness.    Constipation, unspecified constipation type  Advised to increase daily water and fiber intake.  She will continue Colace as needed.    Subjective Beh is a 43 year old, presenting for follow-up.    She was on metformin for the diabetes, it was held since last visit due to good blood sugar control.  She is checking fasting blood sugar once or twice a week.  She brought in the log, usually are in the 90s and high 80s.  No low blood sugar with hypoglycemic symptoms reported.  She has seen history of viral hepatitis B, hep B DNA quant was very low in 2/23.  No jaundice or fatigue.  Back pain and neck pain are improving.  She has gabapentin and Flexeril but has not been taking them lately.  She has Colace for constipation, taking only as needed.  No blood in the stool.  She is closely followed by oncology for gastric cancer.  Last seen in April 2023, recommended 6 months follow-up.  She started working again since November 2022 but she is still receiving [employer] benefits and wants to stop getting those checks.  She does not know where to call.          7/28/2023     8:24 AM   Additional Questions   Roomed by Daniela Howard   Accompanied by Spouse       History of Present Illness       Reason for visit:  F/u          Review of Systems  "  CONSTITUTIONAL: NEGATIVE for fever, chills, change in weight  ENT/MOUTH: NEGATIVE for ear, mouth and throat problems  RESP: NEGATIVE for significant cough or SOB  CV: NEGATIVE for chest pain, palpitations or peripheral edema      Objective    BP 96/68 (BP Location: Left arm, Patient Position: Sitting, Cuff Size: Adult Regular)   Pulse 58   Temp 97.7  F (36.5  C) (Oral)   Resp 12   Ht 1.625 m (5' 3.98\")   Wt 68.9 kg (152 lb)   LMP 04/28/2023 (Approximate)   SpO2 99%   BMI 26.11 kg/m    Body mass index is 26.11 kg/m .  Physical Exam   GENERAL: healthy, alert and no distress  NECK: Supple.   RESP: lungs clear to auscultation - no rales, rhonchi or wheezes  CV: regular rate and rhythm, normal S1 S2, no S3 or S4, no murmur, click or rub, no peripheral edema and peripheral pulses strong  ABDOMEN: soft, nontender, no hepatosplenomegaly, no masses and bowel sounds normal  MS: no gross musculoskeletal defects noted, no edema  PSYCH: mentation appears normal, affect normal/bright                      "

## 2023-07-31 ENCOUNTER — PATIENT OUTREACH (OUTPATIENT)
Dept: CARE COORDINATION | Facility: CLINIC | Age: 43
End: 2023-07-31
Payer: COMMERCIAL

## 2023-07-31 NOTE — PROGRESS NOTES
Clinic Care Coordination Contact  Community Health Worker Initial Outreach  Spoke with patient through docplannerreynoldLittle Bird  ID [id number] (Baw)    CHW Initial Information Gathering:  Referral Source: PCP  Current living arrangement:: I live in a private home with family  Type of residence:: Private home - stairs  Community Resources: None  Supplies Currently Used at Home: None  Equipment Currently Used at Home: none  Informal Support system:: Family, Children  No PCP office visit in Past Year: No  Transportation means:: Accessible car, Family  CHW Additional Questions  If ED/Hospital discharge, follow-up appointment scheduled as recommended?: N/A  Medication changes made following ED/Hospital discharge?: N/A  MyChart active?: No  Patient agreeable to assistance with activating MyChart?: No    Patient accepts CC: Yes. Patient scheduled for assessment with JACQUE Mcdonnell on 8/3/23 at 10AM. Patient noted desire to discuss CCC and resources for support with stopping SSI benefits.     Chart Review: Referral from PCP  Reason for Referral: Wants her SSI benefit stop since she is working now.     Note: Please call in am, she works in the evenings.     Anne Wu  Clinic Care Coordination  [facility]    Phone: [phone]

## 2023-08-03 ENCOUNTER — PATIENT OUTREACH (OUTPATIENT)
Dept: CARE COORDINATION | Facility: CLINIC | Age: 43
End: 2023-08-03

## 2023-08-03 ASSESSMENT — ACTIVITIES OF DAILY LIVING (ADL): DEPENDENT_IADLS:: INDEPENDENT

## 2023-08-03 NOTE — LETTER
[facility]  Patient Centered Plan of Care  About Me:        Patient Name:  Beh Meh    YOB: 1980  Age:         43 year old   Raf MRN:    [id number] Telephone Information:  Home Phone [phone]   Mobile [phone]       Address:  [address]  [facility] [address] Email address:  No e-mail address on record      Emergency Contact(s)    Name Relationship Lgl Grd Work Phone Home Phone Mobile Phone   1. TAMICA,PRAY Son   [phone]   2. FATHER CASSANDRA Other    [phone]   3. TAMICA,OO Friend   [phone]           Primary language:  Tamanna      needed? Yes   [employer]:  [phone] op. 1  Other communication barriers:Language barrier    Preferred Method of Communication:     Current living arrangement: I live in a private home with family    Mobility Status/ Medical Equipment: Independent        Health Maintenance  Health Maintenance Reviewed: Due/Overdue   Health Maintenance Due   Topic Date Due    HEPATITIS A IMMUNIZATION (1 of 2 - Risk 2-dose series) Never done    EYE EXAM  07/14/2023          My Access Plan  Medical Emergency 911   Primary Clinic Line [facility] [phone]   24 Hour Appointment Line [phone] or  [phone]) (toll-free)   24 Hour Nurse Line [phone] (toll-free)   Preferred Urgent Care No data recorded   Preferred Hospital No data recorded   Preferred Pharmacy [facility] - [place], [place] - [address] Behavioral Health Crisis Line The National Suicide Prevention Lifeline at 1-221.750.6170 or Text/Call 958             My Care Team Members  Patient Care Team         Relationship Specialty Notifications Start End    Tk Jay MD PCP - General Family Medicine  12/3/21     Phone: [phone] Fax: [phone]         [address] [place] [address]    Tk Jay MD Assigned PCP   12/5/21     Phone: [phone] Fax: [phone]         [address] [place], [place] [address]  [facility] [address] [place] [address]    Anne Wu Community Health Worker Primary Care - CC Admissions 7/28/23     Sandrita Delgado LICSW Lead Care Coordinator  Admissions 7/31/23     Magda Mcmahon JESSE Lead Care Coordinator  Admissions 8/3/23               My Care Plans  Self Management and Treatment Plan  Care Plan  Care Plan: Financial Wellbeing       Problem: SSI Assistance       Goal: Assistance contacting Social Security to report wages       Start Date: 8/3/2023    Note:     Goal Statement: I will take steps over the next two months to ensure my wages are reported to Social Security.   Barriers: language, phone wait times  Strengths: accepting of help  Patient expressed understanding of goal: yes    Action steps to achieve this goal:  I will  the phone when my CCC team calls to assist me with this goal.   I will call [phone] between 7 a.m. and 7 p.m., Monday through Friday with my CCC team and an  to report my wages.   If need be, I will go in person with my family to my local Social Security office.   I will continue to outreach to care coordination as needed for additional resources or supports.                                      My Medical and Care Information  Problem List   Patient Active Problem List   Diagnosis    Chronic midline low back pain without sciatica    Chronic viral hepatitis B without delta agent and without coma (H)    Gastric adenocarcinoma (H)    Type 2 diabetes mellitus treated without insulin (H)    Constipation, unspecified constipation type      Current Medications and Allergies:    Current Outpatient Medications   Medication    acetaminophen (TYLENOL) 500 MG tablet    alcohol swab prep pads    blood glucose (NO BRAND SPECIFIED) test strip    Blood Glucose Monitoring Suppl (ACCU-CHEK GUIDE ME) w/Device KIT    metFORMIN (GLUCOPHAGE XR) 500 MG 24 hr tablet    polyethylene glycol (MIRALAX) 17 GM/Dose powder    thin (NO BRAND SPECIFIED) lancets     No  current facility-administered medications for this visit.     No Known Allergies     Care Coordination Start Date: 7/28/2023   Frequency of Care Coordination: monthly     Form Last Updated: 08/03/2023

## 2023-08-03 NOTE — PROGRESS NOTES
Clinic Care Coordination Contact  Clinic Care Coordination Contact  OUTREACH    Call placed through language line     Referral Information:  Referral Source: PCP    Primary Diagnosis: Psychosocial    Chief Complaint   Patient presents with    Clinic Care Coordination - Initial        Universal Utilization:   Clinic Utilization  Difficulty keeping appointments:: No  Compliance Concerns: Yes  No-Show Concerns: No  No PCP office visit in Past Year: No  Utilization      Hospital Admissions  0             ED Visits  0             No Show Count (past year)  0                    Current as of: 7/31/2023  5:36 PM                Clinical Concerns:  Current Medical Concerns:    Patient Active Problem List   Diagnosis    Chronic midline low back pain without sciatica    Chronic viral hepatitis B without delta agent and without coma (H)    Gastric adenocarcinoma (H)    Type 2 diabetes mellitus treated without insulin (H)    Constipation, unspecified constipation type        Current Behavioral Concerns: no current concerns      Education Provided to patient: Pt and SWCC discussed reporting wage to social security, how not reporting might result in needing to make repayments, how stopping work would impact her, how continuing to work did not mean she could not get benefits in the future    Pain  Pain (GOAL):: No  Health Maintenance Reviewed: Due/Overdue   Health Maintenance Due   Topic Date Due    HEPATITIS A IMMUNIZATION (1 of 2 - Risk 2-dose series) Never done    EYE EXAM  07/14/2023      Clinical Pathway: None    Medication Management:  Medication review status: medications not reviewed    Functional Status:  Dependent ADLs:: Independent  Dependent IADLs:: Independent  Bed or wheelchair confined:: No  Mobility Status: Independent  Fallen 2 or more times in the past year?: No  Any fall with injury in the past year?: No    Living Situation:  Current living arrangement:: I live in a private home with family  Type of  residence:: Private [facility]""    Lifestyle & Psychosocial Needs:    Social Determinants of Health     Tobacco Use: Low Risk  (7/28/2023)    Patient History     Smoking Tobacco Use: Never     Smokeless Tobacco Use: Never     Passive Exposure: Never   Alcohol Use: Not At Risk (3/2/2020)    AUDIT-C     Frequency of Alcohol Consumption: Never     Average Number of Drinks: Not on file     Frequency of Binge Drinking: Not on file   Financial Resource Strain: Not on file   Food Insecurity: Not on file   Transportation Needs: Not on file   Physical Activity: Not on file   Stress: Not on file   Social Connections: Not on file   Intimate Partner Violence: Not on file   Depression: Not at risk (6/16/2023)    PHQ-2     PHQ-2 Score: 0   Housing Stability: Not on file     Diet:: Regular  Inadequate nutrition (GOAL):: No  Tube Feeding: No  Inadequate activity/exercise (GOAL):: No  Significant changes in sleep pattern (GOAL): No  Transportation means:: Accessible car, Family     Taoist or spiritual beliefs that impact treatment:: No  Mental health DX:: No  Mental health management concern (GOAL):: No  Chemical Dependency Status: No Current Concerns  Informal Support system:: Family, Children             Resources and Interventions:  Current Resources:      Community Resources: None  Supplies Currently Used at Home: None  Equipment Currently Used at Home: none  Employment Status: employed part-time         Advance Care Plan/Directive  Advanced Care Plans/Directives on file:: No  Advanced Care Plan/Directive Status: Declined Further Information    Referrals Placed: None         Care Plan:  Care Plan: Financial Wellbeing       Problem: SSI Assistance       Goal: Assistance contacting Social Security to report wages       Start Date: 8/3/2023    Note:     Goal Statement: I will take steps over the next two months to ensure my wages are reported to Social Security.   Barriers: language, phone wait times  Strengths: accepting of  help  Patient expressed understanding of goal: yes    Action steps to achieve this goal:  I will  the phone when my CCC team calls to assist me with this goal.   I will call [phone] between 7 a.m. and 7 p.m., Monday through Friday with my CCC team and an  to report my wages.   If need be, I will go in person with my family to my local Social Security office.   I will continue to outreach to care coordination as needed for additional resources or supports.                                 Patient/Caregiver understanding: Pt reports understanding and denies any additional questions or concerns at this times. SW CC engaged in AIDET communication during encounter.     Outreach Frequency: monthly  Future Appointments                In 2 months Tk Jay MD [facility] NIDIA Chaudhary            Plan: [facility] completed enrollment to [facility]. CC completed handoff to CHWCC. SWCC provided resources via phone and mail. CHWCC will complete outreach in about 2 week. SWCC will complete chart review in about 6 weeks. SWCC reviewed care coordination role and availability with Pt. SWCC discussed resources and supports available. Resources discussed: calling to social security with CHWCC and .

## 2023-08-03 NOTE — LETTER
[facility] CARE COORDINATION  [address]  [place] [address]   August 3, 2023    Beh Meh  [address]  [place] [address]      Dear Beh,        I am a  clinic care coordinator who works with Tk Jay MD with the [facility]. I wanted to thank you for spending the time to talk with me.  Below is a description of clinic care coordination and how I can further assist you.       The clinic care coordination team is made up of a registered nurse, , financial resource worker and community health worker who understand the health care system. The goal of clinic care coordination is to help you manage your health and improve access to the health care system. Our team works alongside your provider to assist you in determining your health and social needs. We can help you obtain health care and community resources, providing you with necessary information and education. We can work with you through any barriers and develop a care plan that helps coordinate and strengthen the communication between you and your care team.  Our services are voluntary and are offered without charge to you personally.    Please feel free to contact me with any questions or concerns regarding care coordination and what we can offer.      We are focused on providing you with the highest-quality healthcare experience possible.    Sincerely,     Sandrita Delgado, [facility] Clinical Care Coordinator  [facility], [facility], [facility], [facility], and [facility]     Enclosed: I have enclosed a copy of the Patient Centered Plan of Care. This has helpful information and goals that we have talked about. Please keep this in an easy to access place to use as needed.

## 2023-08-03 NOTE — Clinical Note
FYI enrollment to [facility] complete. Pt needs assistance calling Social Security: [phone] between 7 a.m. and 7 p.m., Monday through Friday. Morning is best, needs  (doesn't want to use children). Can you assist with this? - Sandrita

## 2023-08-17 ENCOUNTER — APPOINTMENT (OUTPATIENT)
Dept: NURSING | Facility: CLINIC | Age: 43
End: 2023-08-17
Payer: COMMERCIAL

## 2023-08-17 ENCOUNTER — PATIENT OUTREACH (OUTPATIENT)
Dept: CARE COORDINATION | Facility: CLINIC | Age: 43
End: 2023-08-17

## 2023-08-17 ASSESSMENT — ACTIVITIES OF DAILY LIVING (ADL): DEPENDENT_IADLS:: INDEPENDENT

## 2023-08-17 NOTE — PROGRESS NOTES
Clinic Care Coordination Contact  Community Health Worker Follow Up  Spoke with patient through [employer]  ID [id number] (Wilfrido Dobson)    Care Gaps:     Health Maintenance Due   Topic Date Due    HEPATITIS A IMMUNIZATION (1 of 2 - Risk 2-dose series) Never done    EYE EXAM  07/14/2023     Postponed to next outreach.      Care Plan:   Care Plan: Financial Wellbeing       Problem: SSI Assistance       Goal: Assistance contacting Social Security to report wages       Start Date: 8/3/2023    This Visit's Progress: 80%    Priority: High    Note:     Goal Statement: I will take steps over the next two months to ensure my wages are reported to Social Security.   Barriers: language, phone wait times  Strengths: accepting of help  Patient expressed understanding of goal: yes    Action steps to achieve this goal:  I will  the phone when my CCC team calls to assist me with this goal.   I will call [phone] between 7 a.m. and 7 p.m., Monday through Friday with my CCC team and an  to report my wages. (Completed on 8/17/23)  If need be, I will go in person with my family to my local Social Security office.     I will notify my employer of my limited work hours as instructed by [employer].   I will continue to outreach to care coordination as needed for additional resources or supports.                             Intervention and Education during outreach:   Per CCSW delegation, CHW to assist patient with calling [facility]. [place]: [phone] between 7 a.m. and 7 p.m., Monday through Friday.     CHW reviewed above, patient agreed, conference called Susan at [facility] Office. [facility]  through [facility].   Patient reported that she started working at [employer] Nov 2022, she had a difficult time reaching [facility] to make report. She makes 18.07 an hour and works 39 hours per week, employee number [phone], position is assembly packaging line.   Susan explained  the work period trial. You have a work period trial over 5 years from date you were certified disabled, during this time the trial gives an opportunity for clients to test working. During the 5 years, you are only allowed 9 months to make outside of the 1,050.00 monthly. If you used all the 9 months you must not go over the 1,050.00. If you do make over the amount you will be contacte by Social Security. Susan explained next step, patient will received a letter confirming conversation today, she should also notify her employer of her limited work hours. Patient expressed understanding and plans to notify her employer. Patient will notify CCC team if she needs support with reviewing letter.     CHW Plan: CHW to follow up in 1 month    Anne Wu  [facility] Care Coordination  [facility]    Phone: [phone]

## 2023-09-13 DIAGNOSIS — E11.9 TYPE 2 DIABETES MELLITUS TREATED WITHOUT INSULIN (H): ICD-10-CM

## 2023-09-14 RX ORDER — UBIQUINOL 100 MG
CAPSULE ORAL
Qty: 200 EACH | Refills: 3 | Status: SHIPPED | OUTPATIENT
Start: 2023-09-14

## 2023-09-15 ENCOUNTER — PATIENT OUTREACH (OUTPATIENT)
Dept: CARE COORDINATION | Facility: CLINIC | Age: 43
End: 2023-09-15
Payer: COMMERCIAL

## 2023-09-15 NOTE — PROGRESS NOTES
Clinic Care Coordination Contact  Care Coordination Clinician Chart Review    Situation: Patient chart reviewed by Care Coordinator.       Background: Care Coordination Program started: 7/28/2023. Initial assessment completed and patient-centered care plan(s) were developed with participation from patient. Lead CC handed patient off to CHW for continued outreaches.       Assessment: Per chart review, patient outreach completed by CC CHW on .8/17  Patient is actively working to accomplish goal(s). Patient's goal(s) appropriate and relevant at this time. Patient is not due for updated Plan of Care.  Assessments will be completed annually or as needed/with change of patient status.      Care Plan: Financial Wellbeing       Problem: SSI Assistance       Goal: Assistance contacting Social Security to report wages       Start Date: 8/3/2023    This Visit's Progress: 80%    Priority: High    Note:     Goal Statement: I will take steps over the next two months to ensure my wages are reported to Social Security.   Barriers: language, phone wait times  Strengths: accepting of help  Patient expressed understanding of goal: yes    Action steps to achieve this goal:  I will  the phone when my CCC team calls to assist me with this goal.   I will call [phone] between 7 a.m. and 7 p.m., Monday through Friday with my CCC team and an  to report my wages. (Completed on 8/17/23)  If need be, I will go in person with my family to my local Social Security office.     I will notify my employer of my limited work hours as instructed by [employer].   I will continue to outreach to care coordination as needed for additional resources or supports.                                    Plan/Recommendations: The patient will continue working with Care Coordination to achieve goal(s) as above. CHW will continue outreaches at minimum every 30 days and will involve Lead CC as needed or if patient is ready to move to  Maintenance. Lead CC will continue to monitor CHW outreaches and patient's progress to goal(s) every 6 weeks.     Plan of Care updated and sent to patient: ELLA Perez, [facility]  Social Work Care Coordinator

## 2023-09-18 ENCOUNTER — PATIENT OUTREACH (OUTPATIENT)
Dept: CARE COORDINATION | Facility: CLINIC | Age: 43
End: 2023-09-18
Payer: COMMERCIAL

## 2023-09-18 ASSESSMENT — ACTIVITIES OF DAILY LIVING (ADL): DEPENDENT_IADLS:: INDEPENDENT

## 2023-09-18 NOTE — PROGRESS NOTES
Clinic Care Coordination Contact  Community Health Worker Follow Up  [facility]  ID [id number] (Wilfrido Dobson)    Care Gaps:     Health Maintenance Due   Topic Date Due    HEPATITIS A IMMUNIZATION (1 of 2 - Risk 2-dose series) Never done    EYE EXAM  07/14/2023    INFLUENZA VACCINE (1) 09/01/2023    A1C  09/16/2023     Scheduled 10/31/23 at 8:10AM with Dr. Jay.      Care Plan:   Care Plan: Financial Wellbeing       Problem: SSI Assistance       Goal: Assistance contacting Social Security to report wages       Start Date: 8/3/2023    This Visit's Progress: 80% Recent Progress: 80%    Priority: High    Note:     Goal Statement: I will take steps over the next two months to ensure my wages are reported to Social Security.   Barriers: language, phone wait times  Strengths: accepting of help  Patient expressed understanding of goal: yes    Action steps to achieve this goal:  I will  the phone when my CCC team calls to assist me with this goal.   I will call [phone] between 7 a.m. and 7 p.m., Monday through Friday with my CCC team and an  to report my wages. (Completed on 8/17/23)  If need be, I will go in person with my family to my local Social Security office.     I will notify my employer of my limited work hours as instructed by [employer].   I will continue to outreach to care coordination as needed for additional resources or supports.                             Intervention and Education during outreach:   CHW inquired if patient had the change to notify employer of income limit per SSA. Patient shares that she did inform her employer and was told they cannot accommodate it. Patient is wondering if she might need a doctor's note to support her accommodations.  Patient also received a letter after notifying [employer]SA of her work hours and income. She doesn't read English so her  took letter to her day care center to help review it. CHW encouraged follow up  appointment with CCSW to review letter. Patient agreed, appointment scheduled on Thurs 9/21 at 11AM with CCSW.    CHW Plan:  CHW to follow up in 1 month. Routing to lead clinician SURENDRAW for review.     Anne Wu  [facility] Care Coordination  [facility]    Phone: [phone]

## 2023-09-18 NOTE — Clinical Note
Haroon Man,  I plan to meet patient with you. Please let me know if you have any questions, thank you.   Christianne

## 2023-09-21 ENCOUNTER — ALLIED HEALTH/NURSE VISIT (OUTPATIENT)
Dept: NURSING | Facility: CLINIC | Age: 43
End: 2023-09-21
Payer: MEDICARE

## 2023-09-21 DIAGNOSIS — Z71.89 COMPLEX CARE COORDINATION: Primary | ICD-10-CM

## 2023-09-21 PROCEDURE — 99207 PR NO CHARGE NURSE ONLY: CPT

## 2023-09-21 ASSESSMENT — ACTIVITIES OF DAILY LIVING (ADL): DEPENDENT_IADLS:: INDEPENDENT

## 2023-09-21 NOTE — PROGRESS NOTES
Clinic Care Coordination Contact  Clinic Care Coordination Contact  OUTREACH    Referral Information:  Referral Source: PCP    Primary Diagnosis: Psychosocial    Chief Complaint   Patient presents with    Clinic Care Coordination - Follow-up        Universal Utilization: appropriate  Clinic Utilization  Difficulty keeping appointments:: No  Compliance Concerns: Yes  No-Show Concerns: No  No PCP office visit in Past Year: No  Utilization      Hospital Admissions  0             ED Visits  0             No Show Count (past year)  1                    Current as of: 9/19/2023 10:02 PM                Clinical Concerns:  Current Medical Concerns:  none    Current Behavioral Concerns: none    Education Provided to patient: CCC support, education and support.    Pain  Pain (GOAL):: No  Health Maintenance Reviewed: Due/Overdue   Health Maintenance Due   Topic Date Due    HEPATITIS A IMMUNIZATION (1 of 2 - Risk 2-dose series) Never done    EYE EXAM  07/14/2023    INFLUENZA VACCINE (1) 09/01/2023    A1C  09/16/2023          Medication Management:  Medication review status: Medications reviewed and no changes reported per patient.            Functional Status:  Dependent ADLs:: Independent  Dependent IADLs:: Independent  Bed or wheelchair confined:: No  Mobility Status: Independent  Fallen 2 or more times in the past year?: No  Any fall with injury in the past year?: No    Living Situation:  Current living arrangement:: I live in a private home with family  Type of residence:: Private home - stairs    Lifestyle & Psychosocial Needs:    Social Determinants of Health     Food Insecurity: Not on file   Depression: Not at risk (6/16/2023)    PHQ-2     PHQ-2 Score: 0   Housing Stability: Not on file   Tobacco Use: Low Risk  (7/28/2023)    Patient History     Smoking Tobacco Use: Never     Smokeless Tobacco Use: Never     Passive Exposure: Never   Financial Resource Strain: Not on file   Alcohol Use: Not At Risk (3/2/2020)    AUDIT-C      Frequency of Alcohol Consumption: Never     Average Number of Drinks: Not on file     Frequency of Binge Drinking: Not on file   Transportation Needs: Not on file   Physical Activity: Not on file   Interpersonal Safety: Not on file   Stress: Not on file   Social Connections: Not on file     Diet:: Regular  Inadequate nutrition (GOAL):: No  Tube Feeding: No  Inadequate activity/exercise (GOAL):: No  Significant changes in sleep pattern (GOAL): No  Transportation means:: Accessible car, Family     Yarsani or spiritual beliefs that impact treatment:: No  Mental health DX:: No  Mental health management concern (GOAL):: No  Chemical Dependency Status: No Current Concerns  Informal Support system:: Family, Children        Resources and Interventions:  Current Resources:      Community Resources: None  Supplies Currently Used at Home: None  Equipment Currently Used at Home: none  Employment Status: employed part-time         Advance Care Plan/Directive  Advanced Care Plans/Directives on file:: No  Advanced Care Plan/Directive Status: Declined Further Information    Referrals Placed: None         Care Plan:  Care Plan: Financial Wellbeing       Problem: SSI Assistance       Goal: Assistance contacting Social Security to report wages       Start Date: 8/3/2023    This Visit's Progress: 80% Recent Progress: 80%    Priority: High    Note:     Goal Statement: I will take steps over the next two months to ensure my wages are reported to Social Security.   Barriers: language, phone wait times  Strengths: accepting of help  Patient expressed understanding of goal: yes    Action steps to achieve this goal:  I will  the phone when my CCC team calls to assist me with this goal.   I will call [phone] between 7 a.m. and 7 p.m., Monday through Friday with my CCC team and an  to report my wages. (Completed on 8/17/23)  If need be, I will go in person with my family to my local Social Security office.     I  will notify my employer of my limited work hours as instructed by Social Security.   I will continue to outreach to care coordination as needed for additional resources or supports.                                 Patient/Caregiver understanding: yes    Outreach Frequency: monthly  Future Appointments                In 1 month Tk Jay MD [facility] NIDIA Chaudhary SPRO            Plan: CCSW, [employer] ID [id number] and pt completed social security forms that pt brought to appointment. Social Security was looking for specific dates of employment as well as earnings. SW printed off copies of paystubs for [employer] and Called [employer] staffing to verify dates as well as income. Pt requested that SW mail the info. Placed in pre addressed envelope and mailed form clinic mail box. CCSW requested pt to contact CCC if/when any information is received form social security.     Magda Mcmahon MSW, LGSW  Social Work Care Coordinator

## 2023-10-01 ENCOUNTER — TRANSFERRED RECORDS (OUTPATIENT)
Dept: MULTI SPECIALTY CLINIC | Facility: CLINIC | Age: 43
End: 2023-10-01

## 2023-10-01 LAB — RETINOPATHY: NORMAL

## 2023-10-18 ENCOUNTER — TRANSFERRED RECORDS (OUTPATIENT)
Dept: HEALTH INFORMATION MANAGEMENT | Facility: CLINIC | Age: 43
End: 2023-10-18
Payer: COMMERCIAL

## 2023-10-19 ENCOUNTER — PATIENT OUTREACH (OUTPATIENT)
Dept: CARE COORDINATION | Facility: CLINIC | Age: 43
End: 2023-10-19
Payer: COMMERCIAL

## 2023-10-19 ASSESSMENT — ACTIVITIES OF DAILY LIVING (ADL): DEPENDENT_IADLS:: INDEPENDENT

## 2023-10-19 NOTE — PROGRESS NOTES
Clinic Care Coordination Contact  Community Health Worker Follow Up  [facility] with [facility] through [facility]  ID [id number]    Care Gaps:     Health Maintenance Due   Topic Date Due    HEPATITIS A IMMUNIZATION (1 of 2 - Risk 2-dose series) Never done    EYE EXAM  07/14/2023    INFLUENZA VACCINE (1) 09/01/2023    COVID-19 Vaccine (6 - 2023-24 season) 09/01/2023    A1C  09/16/2023     Scheduled 10/31/23 at 8:10AM with Dr. Jay.         Care Plan:   Care Plan: Financial Wellbeing       Problem: SSI Assistance       Goal: Assistance contacting Social Security to report wages       Start Date: 8/3/2023    This Visit's Progress: 80% Recent Progress: 80%    Priority: High    Note:     Goal Statement: I will take steps over the next two months to ensure my wages are reported to Social Security.   Barriers: language, phone wait times  Strengths: accepting of help  Patient expressed understanding of goal: yes    Action steps to achieve this goal:  I will  the phone when my CCC team calls to assist me with this goal.   I will call [phone] between 7 a.m. and 7 p.m., Monday through Friday with my CCC team and an  to report my wages. (Completed on 8/17/23)  If need be, I will go in person with my family to my local Social Security office.     I will notify my employer of my limited work hours as instructed by [employer].   I will continue to outreach to care coordination as needed for additional resources or supports.                               Care Plan: Eye Exam       Problem: Type 2 diabetes mellitus treated without insulin       Goal: I want to complete an Eye Exam in the next 60 days to determine my current ocular health needs.       Start Date: 10/19/2023 Expected End Date: 12/19/2023    This Visit's Progress: 20%    Priority: High    Note:     Barriers: Language  Strengths: Willing to schedule.  Patient expressed understanding of goal: Yes    Action steps to achieve this goal:  1. I will  answer my phone when I am contacted to schedule my Eye Exam appointment.  2. I will attend my Eye Exam at [facility] on Tue 10/24 at 10:40AM.  3. I will follow up with CCC regarding this goal at each outreach until it is completed.     [facility] - [address] [place] [address]  Ph: [phone]  Fax: [phone]                            Intervention and Education during outreach:   Per chart review, CCSW assisted with completing Social Security form requesting specific dates of employment and earnings and mailed it with paystubs on 9/21.   CHW reviewed above and inquired if patient received any follow up after submitting information requested by Social Security. Patient share that she has not and will notify CCC team when she does.     CHW inquired about diabetic eye exam. Patient shares that the place she went to in [place] told her that she would have to pay out of pocket so she never scheduled appointment. Per chart review, patient see at [facility] 7/14/22. Conference called Sheila at [facility], states it's possible she was told this for eye glasses but her insurance covers exam.   Patient prefers to go to clinic where exam and glasses are covered. Conference called Mp at [facility], appointment scheduled 10/24 at 10:40AM, confirmed Al[facility] is in network for exam and glasses. New goal created to support patient as needed with follow up.     CHW Plan:  CHW to follow up in 1 month    Anne Wu  Clinic Care Coordination  [facility]    Phone: [phone]

## 2023-10-24 ENCOUNTER — TRANSFERRED RECORDS (OUTPATIENT)
Dept: HEALTH INFORMATION MANAGEMENT | Facility: CLINIC | Age: 43
End: 2023-10-24

## 2023-10-25 ENCOUNTER — TRANSFERRED RECORDS (OUTPATIENT)
Dept: HEALTH INFORMATION MANAGEMENT | Facility: CLINIC | Age: 43
End: 2023-10-25

## 2023-10-30 ENCOUNTER — PATIENT OUTREACH (OUTPATIENT)
Dept: CARE COORDINATION | Facility: CLINIC | Age: 43
End: 2023-10-30
Payer: COMMERCIAL

## 2023-10-30 NOTE — PROGRESS NOTES
Clinic Care Coordination Contact  Care Coordination Clinician Chart Review    Situation: Patient chart reviewed by Care Coordinator.       Background: Care Coordination Program started: 7/28/2023. Initial assessment completed and patient-centered care plan(s) were developed with participation from patient. Lead CC handed patient off to CHW for continued outreaches.       Assessment: Per chart review, patient outreach completed by CC CHW on 10/19/23.  Patient is actively working to accomplish goal(s). Patient's goal(s) appropriate and relevant at this time. Patient is due for updated Plan of Care.  Assessments will be completed annually or as needed/with change of patient status.      Care Plan: Financial Wellbeing       Problem: SSI Assistance       Goal: Assistance contacting Social Security to report wages       Start Date: 8/3/2023    This Visit's Progress: 80% Recent Progress: 80%    Priority: High    Note:     Goal Statement: I will take steps over the next two months to ensure my wages are reported to Social Security.   Barriers: language, phone wait times  Strengths: accepting of help  Patient expressed understanding of goal: yes    Action steps to achieve this goal:  I will  the phone when my CCC team calls to assist me with this goal.   I will call [phone] between 7 a.m. and 7 p.m., Monday through Friday with my CCC team and an  to report my wages. (Completed on 8/17/23)  If need be, I will go in person with my family to my local Social Security office.     I will notify my employer of my limited work hours as instructed by [employer].   I will continue to outreach to care coordination as needed for additional resources or supports.                               Care Plan: Eye Exam       Problem: Type 2 diabetes mellitus treated without insulin       Goal: I want to complete an Eye Exam in the next 60 days to determine my current ocular health needs.       Start Date:  10/19/2023 Expected End Date: 12/19/2023    This Visit's Progress: 20%    Priority: High    Note:     Barriers: Language  Strengths: Willing to schedule.  Patient expressed understanding of goal: Yes    Action steps to achieve this goal:  1. I will answer my phone when I am contacted to schedule my Eye Exam appointment.  2. I will attend my Eye Exam at [facility] on Tue 10/24 at 10:40AM.  3. I will follow up with CCC regarding this goal at each outreach until it is completed.     [facility] - [address] [place] [address]  Ph: [phone]  Fax: [phone]                                   Plan/Recommendations: The patient will continue working with Care Coordination to achieve goal(s) as above. CHW will continue outreaches at minimum every 30 days and will involve Lead CC as needed or if patient is ready to move to Maintenance. Lead CC will continue to monitor CHW outreaches and patient's progress to goal(s) every 6 weeks.     Plan of Care updated and sent to patient: Yes, via mail    ELLA Hatch, LGSW  Social Work Care Coordinator

## 2023-10-30 NOTE — LETTER
[facility]  Patient Centered Plan of Care  About Me:        Patient Name:  Beh Meh    YOB: 1980  Age:         43 year old   Raf MRN:    [id number] Telephone Information:  Home Phone [phone]   Mobile [phone]       Address:  [address]  [place] [address] Email address:  No e-mail address on record      Emergency Contact(s)    Name Relationship Lgl Grd Work Phone Home Phone Mobile Phone   1. TAMICA,PRAY Son   [phone]   2. FATHER CASSANDRA Other    [phone]   3. TAMICA,OO Friend   [phone]           Primary language:  Tamanna      needed? Yes   [employer] [place] [employer]:  [phone] op. 1  Other communication barriers:Language barrier    Preferred Method of Communication:     Current living arrangement: I live in a private home with family    Mobility Status/ Medical Equipment: Independent        Health Maintenance  Health Maintenance Reviewed: Due/Overdue   Health Maintenance Due   Topic Date Due    HEPATITIS A IMMUNIZATION (1 of 2 - Risk 2-dose series) Never done    EYE EXAM  07/14/2023    INFLUENZA VACCINE (1) 09/01/2023    COVID-19 Vaccine (6 - 2023-24 season) 09/01/2023    A1C  09/16/2023           My Access Plan  Medical Emergency 911   Primary Clinic Line [facility] [phone]   24 Hour Appointment Line [phone] or  [address] [place] [address] ([phone]) (toll-free)   24 Hour Nurse Line [phone] (toll-free)   Preferred Urgent Care [facility] [phone]     Preferred Hospital No data recorded   Preferred Pharmacy [facility] - [place], [place] - [address] [place] [address]     Behavioral Health Crisis Line The National Suicide Prevention Lifeline at 1-608.767.6349 or Text/Call 308           My Care Team Members  Patient Care Team         Relationship Specialty Notifications Start End    Tk Jay MD PCP - General Family Medicine  12/3/21     Phone: [phone] Fax: [phone]          [address] [facility] [address] [place] [address]    Tk Jay MD Assigned PCP   12/5/21     Phone: [phone] Fax: [phone]         [address] [facility] [address] [place] [address]    Anne Wu [facility] Health Worker Primary Care - CC Admissions 7/28/23     Magda Mcmahon LGSW Lead Care Coordinator  Admissions 8/3/23                 My Care Plans  Self Management and Treatment Plan    Care Plan  Care Plan: Financial Wellbeing       Problem: SSI Assistance       Goal: Assistance contacting Social Security to report wages       Start Date: 8/3/2023    This Visit's Progress: 80% Recent Progress: 80%    Priority: High    Note:     Goal Statement: I will take steps over the next two months to ensure my wages are reported to Social Security.   Barriers: language, phone wait times  Strengths: accepting of help  Patient expressed understanding of goal: yes    Action steps to achieve this goal:  I will  the phone when my CCC team calls to assist me with this goal.   I will call [phone] between 7 a.m. and 7 p.m., Monday through Friday with my CCC team and an  to report my wages. (Completed on 8/17/23)  If need be, I will go in person with my family to my local Social Security office.     I will notify my employer of my limited work hours as instructed by [employer].   I will continue to outreach to care coordination as needed for additional resources or supports.                               Care Plan: Eye Exam       Problem: Type 2 diabetes mellitus treated without insulin       Goal: I want to complete an Eye Exam in the next 60 days to determine my current ocular health needs.       Start Date: 10/19/2023 Expected End Date: 12/19/2023    This Visit's Progress: 20%    Priority: High    Note:     Barriers: Language  Strengths: Willing to schedule.  Patient expressed understanding of goal: Yes    Action steps to achieve this goal:  1. I will answer my phone when I am contacted to schedule  my Eye Exam appointment.  2. I will attend my Eye Exam at [facility] on Tue 10/24 at 10:40AM.  3. I will follow up with CCC regarding this goal at each outreach until it is completed.     [facility] - [address] [place] [address]  Ph: [phone]  Fax: [phone]                                Action Plans on File:                       Advance Care Plans/Directives:   Advanced Care Plan/Directives on file:   No    Discussed with patient/caregiver(s): No data recorded           My Medical and Care Information  Problem List   Patient Active Problem List   Diagnosis    Chronic midline low back pain without sciatica    Chronic viral hepatitis B without delta agent and without coma (H)    Gastric adenocarcinoma (H)    Type 2 diabetes mellitus treated without insulin (H)    Constipation, unspecified constipation type      Current Medications and Allergies:  See printed Medication Report.    Care Coordination Start Date: 7/28/2023   Frequency of Care Coordination: monthly     Form Last Updated: 10/30/2023

## 2023-10-31 ENCOUNTER — OFFICE VISIT (OUTPATIENT)
Dept: FAMILY MEDICINE | Facility: CLINIC | Age: 43
End: 2023-10-31
Payer: COMMERCIAL

## 2023-10-31 VITALS
HEART RATE: 59 BPM | DIASTOLIC BLOOD PRESSURE: 74 MMHG | BODY MASS INDEX: 25.56 KG/M2 | OXYGEN SATURATION: 99 % | SYSTOLIC BLOOD PRESSURE: 102 MMHG | RESPIRATION RATE: 16 BRPM | HEIGHT: 64 IN | WEIGHT: 149.7 LBS | TEMPERATURE: 97.6 F

## 2023-10-31 DIAGNOSIS — E11.9 TYPE 2 DIABETES MELLITUS TREATED WITHOUT INSULIN (H): Primary | ICD-10-CM

## 2023-10-31 DIAGNOSIS — M54.50 CHRONIC MIDLINE LOW BACK PAIN WITHOUT SCIATICA: ICD-10-CM

## 2023-10-31 DIAGNOSIS — B18.1 CHRONIC VIRAL HEPATITIS B WITHOUT DELTA AGENT AND WITHOUT COMA (H): ICD-10-CM

## 2023-10-31 DIAGNOSIS — G89.29 CHRONIC MIDLINE LOW BACK PAIN WITHOUT SCIATICA: ICD-10-CM

## 2023-10-31 DIAGNOSIS — C16.9 GASTRIC ADENOCARCINOMA (H): ICD-10-CM

## 2023-10-31 LAB
ANION GAP SERPL CALCULATED.3IONS-SCNC: 8 MMOL/L (ref 7–15)
BUN SERPL-MCNC: 8.9 MG/DL (ref 6–20)
CALCIUM SERPL-MCNC: 9.2 MG/DL (ref 8.6–10)
CHLORIDE SERPL-SCNC: 105 MMOL/L (ref 98–107)
CHOLEST SERPL-MCNC: 161 MG/DL
CREAT SERPL-MCNC: 0.68 MG/DL (ref 0.51–0.95)
DEPRECATED HCO3 PLAS-SCNC: 27 MMOL/L (ref 22–29)
EGFRCR SERPLBLD CKD-EPI 2021: >90 ML/MIN/1.73M2
GLUCOSE SERPL-MCNC: 84 MG/DL (ref 70–99)
HBA1C MFR BLD: 5.6 % (ref 0–5.6)
HDLC SERPL-MCNC: 49 MG/DL
LDLC SERPL CALC-MCNC: 94 MG/DL
NONHDLC SERPL-MCNC: 112 MG/DL
POTASSIUM SERPL-SCNC: 4.4 MMOL/L (ref 3.4–5.3)
SODIUM SERPL-SCNC: 140 MMOL/L (ref 135–145)
TRIGL SERPL-MCNC: 90 MG/DL

## 2023-10-31 PROCEDURE — G0008 ADMIN INFLUENZA VIRUS VAC: HCPCS | Performed by: FAMILY MEDICINE

## 2023-10-31 PROCEDURE — 90686 IIV4 VACC NO PRSV 0.5 ML IM: CPT | Performed by: FAMILY MEDICINE

## 2023-10-31 PROCEDURE — 80061 LIPID PANEL: CPT | Performed by: FAMILY MEDICINE

## 2023-10-31 PROCEDURE — 36415 COLL VENOUS BLD VENIPUNCTURE: CPT | Performed by: FAMILY MEDICINE

## 2023-10-31 PROCEDURE — 99214 OFFICE O/P EST MOD 30 MIN: CPT | Mod: 25 | Performed by: FAMILY MEDICINE

## 2023-10-31 PROCEDURE — 83036 HEMOGLOBIN GLYCOSYLATED A1C: CPT | Performed by: FAMILY MEDICINE

## 2023-10-31 PROCEDURE — 80048 BASIC METABOLIC PNL TOTAL CA: CPT | Performed by: FAMILY MEDICINE

## 2023-10-31 NOTE — COMMUNITY RESOURCES LIST (ENGLISH)
10/31/2023   [facility]  N/A  For questions about this resource list or additional care needs, please contact your primary care clinic or care manager.  Phone: [phone]   Email: N/A   Address: [address] [place] [address]   Hours: N/A        Financial Stability       Rent and mortgage payment assistance  1  [facility] Distance: 1.51 miles      Phone/Virtual   [address] [place] [address]  Language: English  Hours: Mon - Fri 9:00 AM - 4:00 PM  Fees: Free   Phone: (855) 363-1324 Email: ciro@[facility].org Website: http://[facility].org/[facility]/     2  [facility] American Partnership ([facility]) - Merged with [facility] Supportive Housing Assistance Program (SHAP) - Rent and mortgage payment assistance Distance: 1.86 miles      Phone/Virtual   [address] [place] [address]  Language: English, Hmong, Arin, Moroccan  Hours: Mon - Fri 8:30 AM - 5:00 PM  Fees: Free   Phone: (565) 997-1409 Email: sulaiman@ong.org Website: http://www.ong.org/[facility]-impact-areas/          Food and Nutrition       Food pantry  3  [facility] Food Shelf Distance: 0.58 miles      In-Person   [facility] [address] [place] [address]  Language: English  Hours: Mon - Fri 10:00 AM - 12:30 PM , Mon - Tue 1:30 PM - 3:30 PM , Thu - Fri 1:30 PM - 3:30 PM  Fees: Free   Phone: (708) 650-3147 Email: info@[employer].CrowdSystems Website: http://[employer] Systems.org/food-shelves/     4  [facility] Distance: 1.51 miles      [facility]   [address] [place] [address]  Language: English  Hours: Mon - Tue 9:00 AM - 3:00 PM  Fees: Free, Self Pay   Phone: (824) 102-8625 Email: ciro@[facility].salvationarmy.org Website: http://[facility] Hospitalynorth.org/community/tf-yijb-unkux-ave/     SNAP application assistance  5  [employer] [place] Distance: 1.27 miles       Phone/Virtual   [address] [address]  Language: English, Hmong, Citizen of [place], Belizean, Prydeinig  Hours: Mon - Fri 8:30 AM - 4:30 PM  Fees: Free   Phone: (586) 469-1958 Email: brenda@[facility] HospitalMOBi-LEARN.org Website: https://www.[facility]ps Community HospitalZavedenia.comions.org/programs/[place]-food-helpline/     6  [facility] Distance: 1.51 miles      Phone/Virtual   [address] [place] [address]  Language: English  Hours: Mon - Thu 9:00 AM - 4:00 PM , Fri 9:00 AM - 2:00 PM , Sun 10:00 AM - 12:00 PM  Fees: Free   Phone: (445) 240-6806 Email: ciro@[facility] Website: http://[facility] HospitalHopkins Golf.org/[facility]/     Soup kitchen or free meals  7  [facility] Distance: 1.51 miles      [address] [address]  Language: English  Hours: Mon - Fri 11:45 AM - 12:45 PM  Fees: Free   Phone: (487) 336-9502 Email: ciro@[facility] Website: http://[facility] HospitalHopkins Golf.org/[facility]/ub-maya-nsaye-e/     8  [facility] [place] [facility] [place] [employer] [place] - Free Summer Meals Distance: 2.65 miles      In-Person   [address] [place] [address]  Language: English  Hours: Mon - Fri 4:00 PM - 4:30 PM  Fees: Free   Phone: (127) 706-6048 Email: Irene@.[facility]. Website: https://www.[facility]/facilities/nbvjptawy-qkqh-qeehqyehxx-[place]          Transportation       Free or low-cost transportation  9  [employer] Distance: 4.61 miles      In-Person   [address] [place] [address]  Language: English, Prydeinig  Hours: Mon 9:00 AM - 5:00 PM , Tue 9:30 AM - 7:00 PM , Wed 9:00 AM - 5:00 PM , Thu 9:30 AM - 7:00 PM , Fri 9:00 AM - 5:00 PM  Fees: Free   Phone: (855) 700-9567 Email: contactus@SergeMD Website: http://www.SergeMD     10  [facility] [facility] Circulator Bus Distance: 5.72 miles      In-Person   [facility] [place]  [address]  Language: English  Hours: Tue 9:00 AM - 2:00 PM  Fees: Self Pay   Phone: (113) 197-4983 Email: info@MOWGLI Website: http://www.Unveil.Sosei/     Transportation to medical appointments  11  Disc[employer] Ride Distance: 2.41 miles      In-Person   [address] [address]  Language: English  Hours: Mon - Thu 6:00 AM - 6:00 PM , Fri 6:00 AM - 5:00 PM  Fees: Insurance, Self Pay   Phone: (997) 573-9600 Email: office@[employer] Website: https://www.BrandShield/     12  [facility] - Non-Emergency Medical Transportation Distance: 2.43 miles      In-Person   [address] [place] [address]  Language: English  Hours: Mon - Fri 8:00 AM - 4:00 PM Appt. Only  Fees: Self Pay   Phone: (952) 901-7038 Website: http://www.[facility].org/Medical-Services/Emergency-medical-services/Non-emergency-transportation/          Important Numbers & Websites       Emergency Services   911  [facility]   311  Poison Control   (734) 512-4444  Suicide Prevention Lifeline   (487) 988-9432 (TALK)  Child Abuse Hotline   (751) 301-3553 (4-A-Child)  Sexual Assault Hotline   (797) 572-9244 (HOPE)  National Runaway Safeline   (461) 383-8386 (RUNAWAY)  All-Options Talkline   (161) 879-3914  Substance Abuse Referral   (542) 951-5652 (HELP)

## 2023-10-31 NOTE — PROGRESS NOTES
"  Type 2 diabetes mellitus treated without insulin (H)  Not on med.Start med if indicated after test results.   - HEMOGLOBIN A1C  - Basic metabolic panel  - Lipid panel    Gastric adenocarcinoma (H)  Completed treatment. Closely followed  Oncology.   No acute concerns.    Chronic viral hepatitis B without delta agent and without coma (H)  Recheck labs at next visit.     Chronic midline low back pain without sciatica  Stable.     Subjective Beh is a 43 year old female here to follow-up on diabetes.  She was on metformin in the past but currently not on medication for diabetes.  She checks fasting blood sugar few times a week, the numbers are in the 80s and 90s.  She has history of gastric cancer, currently on remission.  Closely followed by oncology.  She has history of chronic hepatitis B but viral load was very low, last checked 7 months ago.  Her back pain is improving.  She takes Flexeril only as needed.          10/31/2023     8:08 AM   Additional Questions   Roomed by Katie PÉREZ             Review of Systems   CONSTITUTIONAL: NEGATIVE for fever, chills, change in weight  ENT/MOUTH: NEGATIVE for ear, mouth and throat problems  RESP: NEGATIVE for significant cough or SOB  CV: NEGATIVE for chest pain/chest pressure      Objective    /74   Pulse 59   Temp 97.6  F (36.4  C) (Oral)   Resp 16   Ht 1.633 m (5' 4.29\")   Wt 67.9 kg (149 lb 11.2 oz)   LMP 10/28/2023 (Exact Date)   SpO2 99%   BMI 25.46 kg/m    Body mass index is 25.46 kg/m .  Physical Exam   GENERAL: healthy, alert and no distress  NECK: Supple  RESP: lungs clear to auscultation - no rales, rhonchi or wheezes  CV: regular rates and rhythm, no murmur, click or rub, and no peripheral edema  ABDOMEN: soft, nontender, no hepatosplenomegaly, no masses and bowel sounds normal  MS: no gross musculoskeletal defects noted, no edema  BACK: no CVA tenderness, no paralumbar tenderness  PSYCH: mentation appears normal, affect normal/bright    This " transcription uses voice recognition software, which may contain typographical errors.                    Answers submitted by the patient for this visit:  Diabetes Visit (Submitted on 10/31/2023)  Chief Complaint: Chronic problems general questions HPI Form  Frequency of checking blood sugars:: a few times a week  What time of day are you checking your blood sugars : before meals  Have you had any blood sugars above 200?: No  Have you had any blood sugars below 70?: No  Hypoglycemia symptoms:: none  Diabetic concerns:: none  Paraesthesia present:: none of these symptoms  Have you had a diabetic eye exam within the last year?: Yes  Date of last eye exam: : last week  Where was this eye exam done?: unsure  General Questionnaire (Submitted on 10/31/2023)  Chief Complaint: Chronic problems general questions HPI Form

## 2023-10-31 NOTE — COMMUNITY RESOURCES LIST (ENGLISH)
10/31/2023   [facility]  N/A  For questions about this resource list or additional care needs, please contact your primary care clinic or care manager.  Phone: [phone]   Email: N/A   Address: [address] [place] [address]   Hours: N/A        Financial Stability       Rent and mortgage payment assistance  1  [facility] Distance: 1.51 miles      Phone/Virtual   [address] [place] [address]  Language: English  Hours: Mon - Fri 9:00 AM - 4:00 PM  Fees: Free   Phone: (634) 174-3748 Email: ciro@[facility].org Website: http://[facility].org/[facility]/     2  [facility] American Partnership ([facility]) - [facility] Supportive Housing Assistance Program (SHAP) - Rent and mortgage payment assistance Distance: 1.86 miles      Phone/Virtual   [address] [place] [address]  Language: English, Hmong, Arin, Saudi Arabian  Hours: Mon - Fri 8:30 AM - 5:00 PM  Fees: Free   Phone: (748) 703-2365 Email: sulaiman@ong.org Website: http://www.ong.org/[facility]-impact-areas/          Food and Nutrition       Food pantry  3  [facility] Food Shelf Distance: 0.58 miles      In-Person   [facility] [address] [place] [address]  Language: English  Hours: Mon - Fri 10:00 AM - 12:30 PM , Mon - Tue 1:30 PM - 3:30 PM , Thu - Fri 1:30 PM - 3:30 PM  Fees: Free   Phone: (521) 754-1005 Email: info@[employer]ot Website: http://Status Overload.org/food-shelves/     4  [facility] Distance: 1.51 miles      [facility]   [address] [place] [address]  Language: English  Hours: Mon - Tue 9:00 AM - 3:00 PM  Fees: Free, Self Pay   Phone: (443) 861-5238 Email: ciro@[facility].salvationarmy.org Website: http://[facility] [employer] [facility] Boysynorth.org/community/sx-gfox-mvult-ave/     SNAP application assistance  5  [employer] [place] Distance: 1.27 miles       Phone/Virtual   [address] [address]  Language: English, Hmong, Burmese, Salvadorean, Monegasque  Hours: Mon - Fri 8:30 AM - 4:30 PM  Fees: Free   Phone: (370) 230-4530 Email: brenda@[facility] HospitalKonjekt.org Website: https://www.[facility] Rey HospitalPhrixus Pharmaceuticalsions.org/programs/[place]-food-helpline/     6  [facility] Distance: 1.51 miles      Phone/Virtual   [address] [place] [address]  Language: English  Hours: Mon - Thu 9:00 AM - 4:00 PM , Fri 9:00 AM - 2:00 PM , Sun 10:00 AM - 12:00 PM  Fees: Free   Phone: (709) 892-8674 Email: ciro@[facility] Website: http://Channing HomeFortunePay.org/[facility]/     Soup kitchen or free meals  7  [facility] Distance: 1.51 miles      [address] [address]  Language: English  Hours: Mon - Fri 11:45 AM - 12:45 PM  Fees: Free   Phone: (599) 107-7524 Email: ciro@[facility] Website: http://Channing HomeFortunePay.org/[facility]/en-sbgu-punxp-e/     8  [facility] [place] [facility] [place] [employer] [place] - Free Summer Meals Distance: 2.65 miles      In-Person   [address] [place] [address]  Language: English  Hours: Mon - Fri 4:00 PM - 4:30 PM  Fees: Free   Phone: (886) 279-6193 Email: Irene@.[facility] in the [place]. Website: https://www.[facility]/facilities/sjuyyzurr-yosb-jzuexbklsb-[place]          Transportation       Free or low-cost transportation  9  [employer] Distance: 4.61 miles      In-Person   [address] [place] [address]  Language: English, Monegasque  Hours: Mon 9:00 AM - 5:00 PM , Tue 9:30 AM - 7:00 PM , Wed 9:00 AM - 5:00 PM , Thu 9:30 AM - 7:00 PM , Fri 9:00 AM - 5:00 PM  Fees: Free   Phone: (746) 540-2843 Email: contactus@[employer] Website: http://www.Biodel     10  [facility] Circulator Bus Distance: 5.72 miles      In-Person   [facility] [place]  [address]  Language: English  Hours: Tue 9:00 AM - 2:00 PM  Fees: Self Pay   Phone: (990) 926-3250 Email: info@Audioms Website: http://www.Fleep.[employer] Technology/     Transportation to medical appointments  11  [employer] Ride Distance: 2.41 miles      In-Person   [address] [address]  Language: English  Hours: Mon - Thu 6:00 AM - 6:00 PM , Fri 6:00 AM - 5:00 PM  Fees: Insurance, Self Pay   Phone: (977) 510-3015 Email: office@Ammado Website: https://www.Ammado/     12  [facility] - Non-Emergency Medical Transportation Distance: 2.43 miles      In-Person   [address] [place] [address]  Language: English  Hours: Mon - Fri 8:00 AM - 4:00 PM Appt. Only  Fees: Self Pay   Phone: (318) 605-5878 Website: http://www.[facility].org/Medical-Services/Emergency-medical-services/Non-emergency-transportation/          Important Numbers & Websites       Emergency Services   911  [facility]   311  Poison Control   (963) 862-2959  Suicide Prevention Lifeline   (814) 850-2182 (TALK)  Child Abuse Hotline   (989) 495-3969 (4-A-Child)  Sexual Assault Hotline   (363) 460-2674 (HOPE)  National Runaway Safeline   (398) 151-4142 (RUNAWAY)  All-Options Talkline   (116) 411-9832  Substance Abuse Referral   (197) 983-1026 (HELP)

## 2023-11-03 ENCOUNTER — TELEPHONE (OUTPATIENT)
Dept: FAMILY MEDICINE | Facility: CLINIC | Age: 43
End: 2023-11-03
Payer: COMMERCIAL

## 2023-11-03 NOTE — TELEPHONE ENCOUNTER
Called pt and relayed lab result. Pt verbalized understanding.      Dariel Forman Cem Say, BSN RN  [facility]        ----- Message from Tk Jay MD sent at 11/2/2023  7:25 AM CDT -----  Cholesterol numbers are good.  Kidney and liver tests are normal.  Diabetes test is also good, she does not need medication for diabetes but please advise to continue checking fasting blood sugar a few times a week.  Please call the patient.    Dr. Tk Jay  11/2/2023 7:25 AM

## 2023-11-20 ENCOUNTER — PATIENT OUTREACH (OUTPATIENT)
Dept: CARE COORDINATION | Facility: CLINIC | Age: 43
End: 2023-11-20
Payer: COMMERCIAL

## 2023-11-20 ASSESSMENT — ACTIVITIES OF DAILY LIVING (ADL): DEPENDENT_IADLS:: INDEPENDENT

## 2023-11-20 NOTE — PROGRESS NOTES
Clinic Care Coordination Contact  [facility]/Voicemail  [place]  ID [id number] (Jarquin)     Clinical Data: Care Coordinator Outreach    Outreach Documentation Number of Outreach Attempt   11/20/2023   4:08 PM 1     Left message on patient's voicemail with call back information and requested return call.    Plan: Care Coordinator will try to reach patient again in 10 business days.    Anne Wu  [facility] Care Coordination  [facility] [place]    Phone: [phone]

## 2023-12-05 ENCOUNTER — PATIENT OUTREACH (OUTPATIENT)
Dept: CARE COORDINATION | Facility: CLINIC | Age: 43
End: 2023-12-05
Payer: COMMERCIAL

## 2023-12-05 ASSESSMENT — ACTIVITIES OF DAILY LIVING (ADL): DEPENDENT_IADLS:: INDEPENDENT

## 2023-12-05 NOTE — PROGRESS NOTES
Clinic Care Coordination Contact  [facility]    Clinical Data: Care Coordinator Outreach    Outreach Documentation Number of Outreach Attempt   11/20/2023   4:08 PM 1   12/5/2023   9:32 AM 2     Per Language Line Solution [phone], [place]  not available. CHW was also transferred to incorrect language twice.     Plan: Care Coordinator will try to reach patient again in 1-2 business days.    Anne Wu  Clinic Care Coordination  [facility]    Phone: [phone]

## 2023-12-06 ENCOUNTER — TRANSFERRED RECORDS (OUTPATIENT)
Dept: HEALTH INFORMATION MANAGEMENT | Facility: CLINIC | Age: 43
End: 2023-12-06
Payer: COMMERCIAL

## 2023-12-07 ENCOUNTER — PATIENT OUTREACH (OUTPATIENT)
Dept: CARE COORDINATION | Facility: CLINIC | Age: 43
End: 2023-12-07
Payer: COMMERCIAL

## 2023-12-07 ASSESSMENT — ACTIVITIES OF DAILY LIVING (ADL): DEPENDENT_IADLS:: INDEPENDENT

## 2023-12-07 NOTE — PROGRESS NOTES
Clinic Care Coordination Contact  Community Health Worker Follow Up  [facility]  [facility] [id number]    Care Gaps:     Health Maintenance Due   Topic Date Due    HEPATITIS A IMMUNIZATION (1 of 2 - Risk 2-dose series) Never done    COVID-19 Vaccine (6 - 2023-24 season) 09/01/2023     Scheduled 1/30/23 at 8:10AM with Dr. Jay       Care Plan:   Care Plan: Financial Wellbeing Completed 12/7/2023      Problem: SSI Assistance  Resolved 12/7/2023      Goal: Assistance contacting Social Security to report wages  Completed 12/7/2023      Start Date: 8/3/2023    This Visit's Progress: 100% Recent Progress: 80%    Priority: High    Note:     Goal Statement: I will take steps over the next two months to ensure my wages are reported to Social Security.   Barriers: language, phone wait times  Strengths: accepting of help  Patient expressed understanding of goal: yes    Action steps to achieve this goal:  I will  the phone when my CCC team calls to assist me with this goal.   I will call [phone] between 7 a.m. and 7 p.m., Monday through Friday with my CCC team and an  to report my wages. (Completed on 8/17/23)  If need be, I will go in person with my family to my local Social Security office.     I will notify my employer of my limited work hours as instructed by [employer].   I will continue to outreach to care coordination as needed for additional resources or supports.                               Care Plan: Eye Exam Completed 12/7/2023      Problem: Type 2 diabetes mellitus treated without insulin  Resolved 12/7/2023      Goal: I want to complete an Eye Exam in the next 60 days to determine my current ocular health needs.  Completed 12/7/2023      Start Date: 10/19/2023 Expected End Date: 12/19/2023    This Visit's Progress: 100% Recent Progress: 20%    Priority: High    Note:     Barriers: Language  Strengths: Willing to schedule.  Patient expressed understanding of goal:  Yes    Action steps to achieve this goal:  1. I will answer my phone when I am contacted to schedule my Eye Exam appointment.  2. I will attend my Eye Exam at [facility] on Tue 10/24 at 10:40AM.  3. I will follow up with CCC regarding this goal at each outreach until it is completed.     [facility] - [address] [place] [address]  Ph: [phone]  Fax: [phone]                            Intervention and Education during outreach:   Patient shares that she has not received any other letters from Social Security after CCSW assisted her with submitting forms. No further support is needed and she will notify CCC team if she needs any support related to S SI benefits. Goal completed     Patient confirmed she completed eye appointment on 10/24 at [facility]. Goal completed.     CHW inquired if patient needs any additional support or resources. Patient shares that she's going to [facility], leaving 2/5/24 and not sure when she's returning. She's not sure if she needs to notify Social Security Office. CHW encourage confirming travel dates and offered follow up with CCSW for support. Patient agreed, appointment scheduled 12/26 at 9AM.   Per consult with CCSW via teams, it's possible patient does not need to notify Social Security Office if traveling less than a month.    CHW Plan:  Routing to to lead clinician CCSW, to review for maintenance. If accepted, next outreach will be in 2 months.    Anne Wu  Clinic Care Coordination  [facility]    Phone: [phone]

## 2023-12-13 ENCOUNTER — PATIENT OUTREACH (OUTPATIENT)
Dept: CARE COORDINATION | Facility: CLINIC | Age: 43
End: 2023-12-13
Payer: COMMERCIAL

## 2023-12-26 ENCOUNTER — ALLIED HEALTH/NURSE VISIT (OUTPATIENT)
Dept: NURSING | Facility: CLINIC | Age: 43
End: 2023-12-26
Payer: COMMERCIAL

## 2023-12-26 DIAGNOSIS — Z71.89 COMPLEX CARE COORDINATION: Primary | ICD-10-CM

## 2023-12-26 PROCEDURE — 99207 PR NO CHARGE NURSE ONLY: CPT

## 2023-12-26 NOTE — PROGRESS NOTES
Clinic Care Coordination Contact  Follow Up Progress Note      Assessment: pt and spouse in to review benefits        Health Maintenance Due   Topic Date Due    HEPATITIS A IMMUNIZATION (1 of 2 - Risk 2-dose series) Never done    COVID-19 Vaccine (6 - 2023-24 season) 09/01/2023           Care Plans      Intervention/Education provided during outreach: CCSW review social security website for benefit info when outside of [place]              Plan:   [facility]'s [facility]  Kevin reviewed social security website for rules about benefits while outside of [place]. Pt and family will be traveling to [facility] from February 5-March 15. CCSW found the following info on ssa.gov:     Your right to Social Security   payments when you are outside   the [place]  If you are a [place] citizen, you   may continue to receive payments while   outside the [place] as long as you are   eligible for payment and you are in a   country where we can send payments.   I  Reviewed website and [facility] is not on the list of Countries that not eligable to receiving benefits while there.     CCSW closed CCC episode as pt is on maintenance, and now is leaving the [facility]. Please send new referral it pt would like further support form CCC.        Magda Mcmahon, MSW, SW  Social Work Care Coordinator

## 2023-12-26 NOTE — LETTER
[facility] CARE COORDINATION  [facility]   December 26, 2023    Beh [facility]  [address]  [place] [address]    Dear Beh,  Your Care Team congratulates you on your journey to maintain wellness. This document will help guide you on your journey to maintain a healthy lifestyle.  You can use this to help you overcome any barriers you may encounter.  If you should have any questions or concerns, you can contact the members of your Care Team or contact your Primary Care Clinic for assistance.     Health Maintenance  Health Maintenance Reviewed:      My Access Plan  Medical Emergency 911   Primary Clinic Line [facility] - [phone]   24 Hour Appointment Line [phone] or  [phone]) (toll-free)   24 Hour Nurse Line [phone] (toll-free)   Preferred Urgent Care     Preferred Hospital     Preferred Pharmacy [facility] - [place], [place] - [address] Behavioral Health Crisis Line The National Suicide Prevention Lifeline at 1-654.833.3140 or 911     My Care Team Members  Patient Care Team         Relationship Specialty Notifications Start End    Tk Jay MD PCP - General Family Medicine  12/3/21     Phone: [phone] Fax: [phone]         [address] [place] [address]    Tk Jay MD Assigned PCP   12/5/21     Phone: [phone] Fax: [phone]         [address] [address] [facility] [address]    Anne Wu CHW Community Health Worker Primary Care - CC Admissions 7/28/23 12/26/23    Magda Mcmahon LGSW Lead Care Coordinator  Admissions 8/3/23 12/26/23                 Goals    None                   It has been your Clinic Care Team's pleasure to work with you on your goals.    Regards,  Your Clinic Care Team

## 2024-01-30 ENCOUNTER — PATIENT OUTREACH (OUTPATIENT)
Dept: CARE COORDINATION | Facility: CLINIC | Age: 44
End: 2024-01-30

## 2024-01-30 ENCOUNTER — OFFICE VISIT (OUTPATIENT)
Dept: FAMILY MEDICINE | Facility: CLINIC | Age: 44
End: 2024-01-30
Payer: COMMERCIAL

## 2024-01-30 VITALS
BODY MASS INDEX: 25.37 KG/M2 | TEMPERATURE: 98.3 F | HEART RATE: 70 BPM | WEIGHT: 148.6 LBS | HEIGHT: 64 IN | RESPIRATION RATE: 16 BRPM | DIASTOLIC BLOOD PRESSURE: 66 MMHG | SYSTOLIC BLOOD PRESSURE: 101 MMHG | OXYGEN SATURATION: 99 %

## 2024-01-30 DIAGNOSIS — C16.9 GASTRIC ADENOCARCINOMA (H): ICD-10-CM

## 2024-01-30 DIAGNOSIS — E11.9 TYPE 2 DIABETES MELLITUS TREATED WITHOUT INSULIN (H): Primary | ICD-10-CM

## 2024-01-30 DIAGNOSIS — M54.50 CHRONIC MIDLINE LOW BACK PAIN WITHOUT SCIATICA: ICD-10-CM

## 2024-01-30 DIAGNOSIS — Z71.89 OTHER SPECIFIED COUNSELING: Primary | Chronic | ICD-10-CM

## 2024-01-30 DIAGNOSIS — Z71.84 TRAVEL ADVICE ENCOUNTER: ICD-10-CM

## 2024-01-30 DIAGNOSIS — K59.00 CONSTIPATION, UNSPECIFIED CONSTIPATION TYPE: ICD-10-CM

## 2024-01-30 DIAGNOSIS — Z59.9 FINANCIAL DIFFICULTIES: ICD-10-CM

## 2024-01-30 DIAGNOSIS — B18.1 CHRONIC VIRAL HEPATITIS B WITHOUT DELTA AGENT AND WITHOUT COMA (H): ICD-10-CM

## 2024-01-30 DIAGNOSIS — G89.29 CHRONIC MIDLINE LOW BACK PAIN WITHOUT SCIATICA: ICD-10-CM

## 2024-01-30 LAB — HBA1C MFR BLD: 5.7 % (ref 0–5.6)

## 2024-01-30 PROCEDURE — 36415 COLL VENOUS BLD VENIPUNCTURE: CPT | Performed by: FAMILY MEDICINE

## 2024-01-30 PROCEDURE — 90691 TYPHOID VACCINE IM: CPT | Performed by: FAMILY MEDICINE

## 2024-01-30 PROCEDURE — 83036 HEMOGLOBIN GLYCOSYLATED A1C: CPT | Performed by: FAMILY MEDICINE

## 2024-01-30 PROCEDURE — 90471 IMMUNIZATION ADMIN: CPT | Performed by: FAMILY MEDICINE

## 2024-01-30 PROCEDURE — 99214 OFFICE O/P EST MOD 30 MIN: CPT | Mod: 25 | Performed by: FAMILY MEDICINE

## 2024-01-30 PROCEDURE — 87517 HEPATITIS B DNA QUANT: CPT | Performed by: FAMILY MEDICINE

## 2024-01-30 SDOH — ECONOMIC STABILITY - INCOME SECURITY: PROBLEM RELATED TO HOUSING AND ECONOMIC CIRCUMSTANCES, UNSPECIFIED: Z59.9

## 2024-01-30 NOTE — PROGRESS NOTES
Clinic Care Coordination Contact  Community Health Worker Initial Outreach    CHW Initial Information Gathering:  Referral Source: PCP  Preferred Hospital: [facility]  [phone]  Preferred Urgent Care: [facility], [phone]  Current living arrangement:: I live in a private home with family  Type of residence:: Private home - stairs  Community Resources: None  Supplies Currently Used at Home: Diabetic Supplies  Equipment Currently Used at Home: none  Informal Support system:: Family  Transportation means:: Family, Medical transport  CHW Additional Questions  If ED/Hospital discharge, follow-up appointment scheduled as recommended?: N/A  Medication changes made following ED/Hospital discharge?: N/A  MyChart active?: No    Patient accepts CC: No, patient does not need CCC services. Patient will be sent Care Coordination introduction letter for future reference.     Per [place] verified below and per [facility] billing that patient has [employer] for primary, Medicare for secondary and third is [place], [place]. Referral/order was submitted today for health insurance renewal.     [place] Department of Human Services: [facility] Care Programs Eligibility Response (271)  SUBSCRIBER INFORMATION   Date of Service Subscriber ID Subscriber Name Birthdate Age Gender   01/30/2024 [id number] BEH MEH 1980 44 FEMALE          Address   [address] [place] [address]          PROVIDER INFORMATION   Provider ID Submitter Transaction ID Provider Name Taxonomy Code Qualifier Taxonomy Code   [id number] [facility]   This subscriber has eligibility for [place]: Medical Assistance.  Elig Type AA: Parent of a dependent child  Eligibility Begin Date: 02/01/2015  Eligibility End Date: 01/31/2024  This subscriber is eligible for the following service types: Medical Care ,  Chiropractic ,  Dental Care ,  Hospital ,  Hospital - Inpatient ,   Hospital - Outpatient ,  Emergency Services ,  Pharmacy ,  Professional (Physician) Visit - Office ,  Vision (Optometry) ,  Mental Health ,  Urgent Care   Prepaid Health Plan   This subscriber receives MA12 - Prepaid Medical Assistance Program (PMAP) delivered through [facility]. The phone numbers are: [phone] (metro) or [phone] (toll free).   Other Eligibility Information   No Special Transportation.  This subscribers eligibility is not determined for Long term Care and waiver services.  No Hospice.  Refer to Health Care Programs and Services Overview of the [facility] Provider Manual for a list of covered services.   Waivers   None     Subscriber Responsibility Information   None     Restricted Recipient Program   None       Medicare   Medicare ID: [id number]   Part A Effective Date:  03/01/2022   Part B Effective Date: 03/01/2022       Other Insurance   HMO COVERAGE   Carrier ID: [id number]   Coverage Types: 05: Pharmacy - Copay per prescription (Provider must bill insurance first.)  06: HMO   Carrier Name: [employer]   Carrier Billing Address: [address],  [facility]   Carrier Contact Phone: [phone]   Group Number: [id number]   Policy Number: [id number]   Policyholder Name: BEH MEH

## 2024-01-30 NOTE — PROGRESS NOTES
Type 2 diabetes mellitus treated without insulin (H)  A1c 5.6 today  - HEMOGLOBIN A1C    Chronic midline low back pain without sciatica  Continue gabapentin daily and Flexeril as needed.    Gastric adenocarcinoma (H)  Closely followed by oncology.    Chronic viral hepatitis B without delta agent and without coma (H)  - Hep B Virus DNA Quant Real Time PCR    Travel advice encounter  - TYPHOID VACCINE, IM    Financial difficulties  - Primary Care - Care Coordination Referral; Future    Constipation, unspecified constipation type  Colace as needed. Increase fluids and fiber intake.    Subjective   Beh is a 44 year old female with history of diabetes mellitus, diet controlled, back pain, history of gastric adenocarcinoma and chronic hepatitis B here for follow-up.    Back pain is stable, able to work full-time now.  Taking gabapentin daily and Flexeril as needed.    Last A1c was 5.7 in 6/23.  The highest A1c was 8.1 in 6/22.  Fasting blood sugar in the 80s to low 100s most days.  She is not taking any medication currently.  Was on metformin in the past.    She takes Colace as needed for constipation.  No acute concerns regarding constipation.    She has history of hepatitis B.  Very low viral load 10 months ago.    She is traveling to [facility], leaving this weekend.  Once typhoid vaccine.    She received a bill from the clinic and wants to discuss her insurance coverage.        1/30/2024     8:37 AM   Additional Questions   Roomed by Josselin FREEMAN     History of Present Illness       Diabetes:   She presents for follow up of diabetes.  She is checking home blood glucose a few times a week.   She checks blood glucose before meals.  Blood glucose is never over 200 and never under 70. She is aware of hypoglycemia symptoms including none.    She has no concerns regarding her diabetes at this time.   She is not experiencing numbness or burning in feet, excessive thirst, blurry vision, weight changes or redness, sores or  "blisters on feet.                Objective    LMP 12/27/2023 (Exact Date)     Vitals:    01/30/24 0839   BP: 101/66   Pulse: 70   Resp: 16   Temp: 98.3  F (36.8  C)   TempSrc: Oral   SpO2: 99%   Weight: 67.4 kg (148 lb 9.6 oz)   Height: 1.633 m (5' 4.29\")      Physical Exam   GENERAL: alert and no distress  NECK: Supple  RESP: lungs clear to auscultation - no rales, rhonchi or wheezes  CV: regular rates and rhythm and no murmur, click or rub  ABDOMEN: soft, nontender, no hepatosplenomegaly, no masses and bowel sounds normal  BACK: no CVA tenderness, no paralumbar tenderness  Diabetic foot exam: no trophic changes or ulcerative lesions and normal sensory exam        Prior to immunization administration, verified patients identity using patient s name and date of birth. Please see Immunization Activity for additional information.     Screening Questionnaire for Adult Immunization    Are you sick today?   No   Do you have allergies to medications, food, a vaccine component or latex?   No   Have you ever had a serious reaction after receiving a vaccination?   No   Do you have a long-term health problem with heart, lung, kidney, or metabolic disease (e.g., diabetes), asthma, a blood disorder, no spleen, complement component deficiency, a cochlear implant, or a spinal fluid leak?  Are you on long-term aspirin therapy?   Yes, diabetes   Do you have cancer, leukemia, HIV/AIDS, or any other immune system problem?   No   Do you have a parent, brother, or sister with an immune system problem?   No   In the past 3 months, have you taken medications that affect  your immune system, such as prednisone, other steroids, or anticancer drugs; drugs for the treatment of rheumatoid arthritis, Crohn s disease, or psoriasis; or have you had radiation treatments?   No   Have you had a seizure, or a brain or other nervous system problem?   No   During the past year, have you received a transfusion of blood or blood    products, or been " given immune (gamma) globulin or antiviral drug?   No   For women: Are you pregnant or is there a chance you could become       pregnant during the next month?   No   Have you received any vaccinations in the past 4 weeks?   No     Immunization questionnaire was positive for at least one answer.   is aware.      Patient instructed to remain in clinic for 15 minutes afterwards, and to report any adverse reactions.     Screening performed by Ralf Gould MA on 1/30/2024 at 10:25 AM.     This transcription uses voice recognition software, which may contain typographical errors.    Signed Electronically by: Tk Jay MD

## 2024-02-01 ENCOUNTER — PATIENT OUTREACH (OUTPATIENT)
Dept: CARE COORDINATION | Facility: CLINIC | Age: 44
End: 2024-02-01
Payer: COMMERCIAL

## 2024-02-01 LAB
HBV DNA SERPL NAA+PROBE-ACNC: 1530 IU/ML
HBV DNA SERPL NAA+PROBE-LOG IU: 3.2 {LOG_IU}/ML

## 2024-02-09 ENCOUNTER — PATIENT OUTREACH (OUTPATIENT)
Dept: CARE COORDINATION | Facility: CLINIC | Age: 44
End: 2024-02-09
Payer: COMMERCIAL

## 2024-02-15 ENCOUNTER — PATIENT OUTREACH (OUTPATIENT)
Dept: CARE COORDINATION | Facility: CLINIC | Age: 44
End: 2024-02-15
Payer: COMMERCIAL

## 2024-03-22 ENCOUNTER — TELEPHONE (OUTPATIENT)
Dept: FAMILY MEDICINE | Facility: CLINIC | Age: 44
End: 2024-03-22
Payer: COMMERCIAL

## 2024-03-22 ENCOUNTER — PATIENT OUTREACH (OUTPATIENT)
Dept: CARE COORDINATION | Facility: CLINIC | Age: 44
End: 2024-03-22
Payer: COMMERCIAL

## 2024-03-22 NOTE — TELEPHONE ENCOUNTER
Reason for Call:  Appointment Request    Patient requesting this type of appt:  appt with     Requested provider:      Reason patient unable to be scheduled: Needs to be scheduled by clinic care coordination.    When does patient want to be seen/preferred time: 3-7 days    Comments: Patient requesting for appt with  to go over patient health benefits.    Okay to leave a detailed message?: Yes at Cell number on file:    Telephone Information:   Mobile [phone]       Call taken on 3/22/2024 at 2:51 PM by Linda Dalton

## 2024-03-22 NOTE — PROGRESS NOTES
"Clinic Care Coordination Contact    Situation: Patient chart reviewed by care coordinator.    Background: Received a message from clinic staff for \" Patient requesting for appt with  to go over patient health benefits. \"    Assessment: FRW episode closed on 2/15/24 due to unreachable. CC episode created today.     Plan/Recommendations: CHW to outreach to patient and schedule appt with a clinician if needed.     "

## 2024-03-25 ENCOUNTER — PATIENT OUTREACH (OUTPATIENT)
Dept: CARE COORDINATION | Facility: CLINIC | Age: 44
End: 2024-03-25
Payer: COMMERCIAL

## 2024-03-25 NOTE — PROGRESS NOTES
Clinic Care Coordination Contact  Community Health Worker Initial Outreach  Patient returned call and gives verbal permission for Wilfrido Arteaga (Daughter), to speak on her behalf.     CHW Initial Information Gathering:  Referral Source: Self-patient/Caregiver  Current living arrangement:: I live in a private home with family  Type of residence:: Private home - stairs  Community Resources: None  Supplies Currently Used at Home: Diabetic Supplies  Equipment Currently Used at Home: none  Informal Support system:: Family  No PCP office visit in Past Year: No  Transportation means:: Family, Accessible car  CHW Additional Questions  If ED/Hospital discharge, follow-up appointment scheduled as recommended?: N/A  Medication changes made following ED/Hospital discharge?: N/A  MyChart active?: No  Patient agreeable to assistance with activating MyChart?: No    Chart Review: Referral from Self-patient/Caregiver   Reason for Referral: Patient requesting for appt with  to go over patient health benefits and letter from Social Security.   Note: See CCR[facility] encounter 3/22/24.    Patient accepts CC: Yes. Patient scheduled for assessment with ADOLPH Hatch on 4/4/24 at 111AM. Patient noted desire to discuss support with health insurance and letter from Social Security regarding overpayment of benefits.     Patient returned call and gives verbal permission for Wilfrido Arteaga (Daughter), to speak on her behalf. Oo shares that patient received a letter regarding social security overpayment. Patient was certified disability due to cancer but started working when she felt better. Patient had Medicare and [place] at one point, daughter is not sure what health insurance is active if any at this time.     Anne Wu  Clinic Care Coordination  [facility]    Phone: [phone]

## 2024-03-25 NOTE — PROGRESS NOTES
Clinic Care Coordination Contact  [facility]/Voicemail  Tamanna  ID [id number]    Clinical Data: Care Coordinator Outreach    Outreach Documentation Number of Outreach Attempt   3/25/2024  10:40 AM 1     Left message on patient's voicemail with call back information and requested return call.    Chart Review: Referral from Self-patient/Caregiver   Reason for Referral: Patient requesting for appt with  to go over patient health benefits.   Note: See CCRN encounter 3/22/24.    Plan: Care Coordinator will try to reach patient again in 10 business days.    Anne Wu  Clinic Care Coordination  [facility]    Phone: [phone]

## 2024-04-04 ENCOUNTER — ALLIED HEALTH/NURSE VISIT (OUTPATIENT)
Dept: NURSING | Facility: CLINIC | Age: 44
End: 2024-04-04
Payer: COMMERCIAL

## 2024-04-04 DIAGNOSIS — Z71.89 COMPLEX CARE COORDINATION: Primary | ICD-10-CM

## 2024-04-04 PROCEDURE — 99207 PR NO CHARGE LOS: CPT

## 2024-04-23 ENCOUNTER — PATIENT OUTREACH (OUTPATIENT)
Dept: CARE COORDINATION | Facility: CLINIC | Age: 44
End: 2024-04-23
Payer: COMMERCIAL

## 2024-04-23 NOTE — PROGRESS NOTES
Clinic Care Coordination Contact  [facility]/Voicemail    Clinical Data:  Care Coordinator Outreach    Outreach Documentation Number of Outreach Attempt   3/25/2024  10:40 AM 1   4/23/2024  11:32 AM 1        With the assistance of a [employer] , there was no answer and could not leave a message. on patient's voicemail with call back information and requested return call.    Plan: NO show for appt on 4/4/24.  . Lead Care Coordinator will try to reach patient again in 10 business days.      BONG Bob / MOE Gresham  [facility]   Care Coordination  [facility]  4/23/2024 11:33 AM

## 2024-04-24 NOTE — PROGRESS NOTES
Clinic Care Coordination Contact        Pt no showed for CCSW appointment.     ELLA Hatch, [facility]  Social Work Care Coordinator

## 2024-06-06 ENCOUNTER — TRANSFERRED RECORDS (OUTPATIENT)
Dept: HEALTH INFORMATION MANAGEMENT | Facility: CLINIC | Age: 44
End: 2024-06-06
Payer: COMMERCIAL

## 2024-06-10 ENCOUNTER — TRANSFERRED RECORDS (OUTPATIENT)
Dept: MULTI SPECIALTY CLINIC | Facility: CLINIC | Age: 44
End: 2024-06-10

## 2024-06-10 NOTE — PROGRESS NOTES
Clinic Care Coordination Contact  Care Coordination Clinician Chart Review    Situation: Patient chart reviewed by Care Coordinator.       Background: Care Coordination Program started: 7/28/2023. Initial assessment completed and patient-centered care plan(s) were developed with participation from patient. Lead CC handed patient off to CHW for continued outreaches.       Assessment: Per chart review, patient outreach completed by CC CHW on 12/7.  Patient is actively working to accomplish goal(s). Patient's goal(s) appropriate and relevant at this time. Patient is not due for updated Plan of Care.  Assessments will be completed annually or as needed/with change of patient status.             Plan/Recommendations: The patient will continue working with Care Coordination to achieve goal(s) as above. CHW will continue outreaches at minimum every 30 days and will involve Lead CC as needed or if patient is ready to move to Maintenance. Lead CC will continue to monitor CHW outreaches and patient's progress to goal(s) every 6 weeks.     Plan of Care updated and sent to patient: No    CCSW will graduate after pt meeting on 12/26 if no new needs arise.       ELLA Hatch, [facility]  Social Work Care Coordinator         Dapsone Pregnancy And Lactation Text: This medication is Pregnancy Category C and is not considered safe during pregnancy or breast feeding. Opioid Pregnancy And Lactation Text: These medications can lead to premature delivery and should be avoided during pregnancy. These medications are also present in breast milk in small amounts. Carac Pregnancy And Lactation Text: This medication is Pregnancy Category X and contraindicated in pregnancy and in women who may become pregnant. It is unknown if this medication is excreted in breast milk. Xolair Pregnancy And Lactation Text: This medication is Pregnancy Category B and is considered safe during pregnancy. This medication is excreted in breast milk. Picato Pregnancy And Lactation Text: This medication is Pregnancy Category C. It is unknown if this medication is excreted in breast milk. Azathioprine Counseling:  I discussed with the patient the risks of azathioprine including but not limited to myelosuppression, immunosuppression, hepatotoxicity, lymphoma, and infections.  The patient understands that monitoring is required including baseline LFTs, Creatinine, possible TPMP genotyping and weekly CBCs for the first month and then every 2 weeks thereafter.  The patient verbalized understanding of the proper use and possible adverse effects of azathioprine.  All of the patient's questions and concerns were addressed. Ketoconazole Counseling:   Patient counseled regarding improving absorption with orange juice.  Adverse effects include but are not limited to breast enlargement, headache, diarrhea, nausea, upset stomach, liver function test abnormalities, taste disturbance, and stomach pain.  There is a rare possibility of liver failure that can occur when taking ketoconazole. The patient understands that monitoring of LFTs may be required, especially at baseline. The patient verbalized understanding of the proper use and possible adverse effects of ketoconazole.  All of the patient's questions and concerns were addressed. Minocycline Counseling: Patient advised regarding possible photosensitivity and discoloration of the teeth, skin, lips, tongue and gums.  Patient instructed to avoid sunlight, if possible.  When exposed to sunlight, patients should wear protective clothing, sunglasses, and sunscreen.  The patient was instructed to call the office immediately if the following severe adverse effects occur:  hearing changes, easy bruising/bleeding, severe headache, or vision changes.  The patient verbalized understanding of the proper use and possible adverse effects of minocycline.  All of the patient's questions and concerns were addressed. Hydroxyzine Counseling: Patient advised that the medication is sedating and not to drive a car after taking this medication.  Patient informed of potential adverse effects including but not limited to dry mouth, urinary retention, and blurry vision.  The patient verbalized understanding of the proper use and possible adverse effects of hydroxyzine.  All of the patient's questions and concerns were addressed. Infliximab Counseling:  I discussed with the patient the risks of infliximab including but not limited to myelosuppression, immunosuppression, autoimmune hepatitis, demyelinating diseases, lymphoma, and serious infections.  The patient understands that monitoring is required including a PPD at baseline and must alert us or the primary physician if symptoms of infection or other concerning signs are noted. Thalidomide Pregnancy And Lactation Text: This medication is Pregnancy Category X and is absolutely contraindicated during pregnancy. It is unknown if it is excreted in breast milk. Dutasteride Pregnancy And Lactation Text: This medication is absolutely contraindicated in women, especially during pregnancy and breast feeding. Feminization of male fetuses is possible if taking while pregnant. Klisyri Pregnancy And Lactation Text: It is unknown if this medication can harm a developing fetus or if it is excreted in breast milk. Rinvoq Pregnancy And Lactation Text: Based on animal studies, Rinvoq may cause embryo-fetal harm when administered to pregnant women.  The medication should not be used in pregnancy.  Breastfeeding is not recommended during treatment and for 6 days after the last dose. Otezla Pregnancy And Lactation Text: This medication is Pregnancy Category C and it isn't known if it is safe during pregnancy. It is unknown if it is excreted in breast milk. Tranexamic Acid Counseling:  Patient advised of the small risk of bleeding problems with tranexamic acid. They were also instructed to call if they developed any nausea, vomiting or diarrhea. All of the patient's questions and concerns were addressed. Infliximab Pregnancy And Lactation Text: This medication is Pregnancy Category B and is considered safe during pregnancy. It is unknown if this medication is excreted in breast milk. Topical Sulfur Applications Pregnancy And Lactation Text: This medication is Pregnancy Category C and has an unknown safety profile during pregnancy. It is unknown if this topical medication is excreted in breast milk. Tetracycline Pregnancy And Lactation Text: This medication is Pregnancy Category D and not consider safe during pregnancy. It is also excreted in breast milk. Ketoconazole Pregnancy And Lactation Text: This medication is Pregnancy Category C and it isn't know if it is safe during pregnancy. It is also excreted in breast milk and breast feeding isn't recommended. Clindamycin Counseling: I counseled the patient regarding use of clindamycin as an antibiotic for prophylactic and/or therapeutic purposes. Clindamycin is active against numerous classes of bacteria, including skin bacteria. Side effects may include nausea, diarrhea, gastrointestinal upset, rash, hives, yeast infections, and in rare cases, colitis. Skyrizi Pregnancy And Lactation Text: The risk during pregnancy and breastfeeding is uncertain with this medication. Cimzia Counseling:  I discussed with the patient the risks of Cimzia including but not limited to immunosuppression, allergic reactions and infections.  The patient understands that monitoring is required including a PPD at baseline and must alert us or the primary physician if symptoms of infection or other concerning signs are noted. Elidel Counseling: Patient may experience a mild burning sensation during topical application. Elidel is not approved in children less than 2 years of age. There have been case reports of hematologic and skin malignancies in patients using topical calcineurin inhibitors although causality is questionable. Niacinamide Counseling: I recommended taking niacin or niacinamide, also know as vitamin B3, twice daily. Recent evidence suggests that taking vitamin B3 (500 mg twice daily) can reduce the risk of actinic keratoses and non-melanoma skin cancers. Side effects of vitamin B3 include flushing and headache. Topical Retinoid counseling:  Patient advised to apply a pea-sized amount only at bedtime and wait 30 minutes after washing their face before applying.  If too drying, patient may add a non-comedogenic moisturizer. The patient verbalized understanding of the proper use and possible adverse effects of retinoids.  All of the patient's questions and concerns were addressed. Methotrexate Pregnancy And Lactation Text: This medication is Pregnancy Category X and is known to cause fetal harm. This medication is excreted in breast milk. Niacinamide Pregnancy And Lactation Text: These medications are considered safe during pregnancy. Calcipotriene Counseling:  I discussed with the patient the risks of calcipotriene including but not limited to erythema, scaling, itching, and irritation. Protopic Counseling: Patient may experience a mild burning sensation during topical application. Protopic is not approved in children less than 2 years of age. There have been case reports of hematologic and skin malignancies in patients using topical calcineurin inhibitors although causality is questionable. Gabapentin Counseling: I discussed with the patient the risks of gabapentin including but not limited to dizziness, somnolence, fatigue and ataxia. Azathioprine Pregnancy And Lactation Text: This medication is Pregnancy Category D and isn't considered safe during pregnancy. It is unknown if this medication is excreted in breast milk. Hydroxyzine Pregnancy And Lactation Text: This medication is not safe during pregnancy and should not be taken. It is also excreted in breast milk and breast feeding isn't recommended. Finasteride Male Counseling: Finasteride Counseling:  I discussed with the patient the risks of use of finasteride including but not limited to decreased libido, decreased ejaculate volume, gynecomastia, and depression. Women should not handle medication.  All of the patient's questions and concerns were addressed. Aklief counseling:  Patient advised to apply a pea-sized amount only at bedtime and wait 30 minutes after washing their face before applying.  If too drying, patient may add a non-comedogenic moisturizer.  The most commonly reported side effects including irritation, redness, scaling, dryness, stinging, burning, itching, and increased risk of sunburn.  The patient verbalized understanding of the proper use and possible adverse effects of retinoids.  All of the patient's questions and concerns were addressed. Oxybutynin Counseling:  I discussed with the patient the risks of oxybutynin including but not limited to skin rash, drowsiness, dry mouth, difficulty urinating, and blurred vision. Protopic Pregnancy And Lactation Text: This medication is Pregnancy Category C. It is unknown if this medication is excreted in breast milk when applied topically. Terbinafine Counseling: Patient counseling regarding adverse effects of terbinafine including but not limited to headache, diarrhea, rash, upset stomach, liver function test abnormalities, itching, taste/smell disturbance, nausea, abdominal pain, and flatulence.  There is a rare possibility of liver failure that can occur when taking terbinafine.  The patient understands that a baseline LFT and kidney function test may be required. The patient verbalized understanding of the proper use and possible adverse effects of terbinafine.  All of the patient's questions and concerns were addressed. Cimzia Pregnancy And Lactation Text: This medication crosses the placenta but can be considered safe in certain situations. Cimzia may be excreted in breast milk. Stelara Counseling:  I discussed with the patient the risks of ustekinumab including but not limited to immunosuppression, malignancy, posterior leukoencephalopathy syndrome, and serious infections.  The patient understands that monitoring is required including a PPD at baseline and must alert us or the primary physician if symptoms of infection or other concerning signs are noted. Minoxidil Counseling: Minoxidil is a topical medication which can increase blood flow where it is applied. It is uncertain how this medication increases hair growth. Side effects are uncommon and include stinging and allergic reactions. Arava Counseling:  Patient counseled regarding adverse effects of Arava including but not limited to nausea, vomiting, abnormalities in liver function tests. Patients may develop mouth sores, rash, diarrhea, and abnormalities in blood counts. The patient understands that monitoring is required including LFTs and blood counts.  There is a rare possibility of scarring of the liver and lung problems that can occur when taking methotrexate. Persistent nausea, loss of appetite, pale stools, dark urine, cough, and shortness of breath should be reported immediately. Patient advised to discontinue Arava treatment and consult with a physician prior to attempting conception. The patient will have to undergo a treatment to eliminate Arava from the body prior to conception. Erivedge Counseling- I discussed with the patient the risks of Erivedge including but not limited to nausea, vomiting, diarrhea, constipation, weight loss, changes in the sense of taste, decreased appetite, muscle spasms, and hair loss.  The patient verbalized understanding of the proper use and possible adverse effects of Erivedge.  All of the patient's questions and concerns were addressed. Sotyktu Counseling:  I discussed the most common side effects of Sotyktu including: common cold, sore throat, sinus infections, cold sores, canker sores, folliculitis, and acne.  I also discussed more serious side effects of Sotyktu including but not limited to: serious allergic reactions; increased risk for infections such as TB; cancers such as lymphomas; rhabdomyolysis and elevated CPK; and elevated triglycerides and liver enzymes.  Wartpeel Counseling:  I discussed with the patient the risks of Wartpeel including but not limited to erythema, scaling, itching, weeping, crusting, and pain. Clindamycin Pregnancy And Lactation Text: This medication can be used in pregnancy if certain situations. Clindamycin is also present in breast milk. Prednisone Counseling:  I discussed with the patient the risks of prolonged use of prednisone including but not limited to weight gain, insomnia, osteoporosis, mood changes, diabetes, susceptibility to infection, glaucoma and high blood pressure.  In cases where prednisone use is prolonged, patients should be monitored with blood pressure checks, serum glucose levels and an eye exam.  Additionally, the patient may need to be placed on GI prophylaxis, PCP prophylaxis, and calcium and vitamin D supplementation and/or a bisphosphonate.  The patient verbalized understanding of the proper use and the possible adverse effects of prednisone.  All of the patient's questions and concerns were addressed. Nsaids Counseling: NSAID Counseling: I discussed with the patient that NSAIDs should be taken with food. Prolonged use of NSAIDs can result in the development of stomach ulcers.  Patient advised to stop taking NSAIDs if abdominal pain occurs.  The patient verbalized understanding of the proper use and possible adverse effects of NSAIDs.  All of the patient's questions and concerns were addressed. Calcipotriene Pregnancy And Lactation Text: This medication has not been proven safe during pregnancy. It is unknown if this medication is excreted in breast milk. Albendazole Counseling:  I discussed with the patient the risks of albendazole including but not limited to cytopenia, kidney damage, nausea/vomiting and severe allergy.  The patient understands that this medication is being used in an off-label manner. Quinolones Counseling:  I discussed with the patient the risks of fluoroquinolones including but not limited to GI upset, allergic reaction, drug rash, diarrhea, dizziness, photosensitivity, yeast infections, liver function test abnormalities, tendonitis/tendon rupture. Fluconazole Counseling:  Patient counseled regarding adverse effects of fluconazole including but not limited to headache, diarrhea, nausea, upset stomach, liver function test abnormalities, taste disturbance, and stomach pain.  There is a rare possibility of liver failure that can occur when taking fluconazole.  The patient understands that monitoring of LFTs and kidney function test may be required, especially at baseline. The patient verbalized understanding of the proper use and possible adverse effects of fluconazole.  All of the patient's questions and concerns were addressed. Bexarotene Counseling:  I discussed with the patient the risks of bexarotene including but not limited to hair loss, dry lips/skin/eyes, liver abnormalities, hyperlipidemia, pancreatitis, depression/suicidal ideation, photosensitivity, drug rash/allergic reactions, hypothyroidism, anemia, leukopenia, infection, cataracts, and teratogenicity.  Patient understands that they will need regular blood tests to check lipid profile, liver function tests, white blood cell count, thyroid function tests and pregnancy test if applicable. Rituxan Counseling:  I discussed with the patient the risks of Rituxan infusions. Side effects can include infusion reactions, severe drug rashes including mucocutaneous reactions, reactivation of latent hepatitis and other infections and rarely progressive multifocal leukoencephalopathy.  All of the patient's questions and concerns were addressed. Tazorac Counseling:  Patient advised that medication is irritating and drying.  Patient may need to apply sparingly and wash off after an hour before eventually leaving it on overnight.  The patient verbalized understanding of the proper use and possible adverse effects of tazorac.  All of the patient's questions and concerns were addressed. Tranexamic Acid Pregnancy And Lactation Text: It is unknown if this medication is safe during pregnancy or breast feeding. Cellcept Counseling:  I discussed with the patient the risks of mycophenolate mofetil including but not limited to infection/immunosuppression, GI upset, hypokalemia, hypercholesterolemia, bone marrow suppression, lymphoproliferative disorders, malignancy, GI ulceration/bleed/perforation, colitis, interstitial lung disease, kidney failure, progressive multifocal leukoencephalopathy, and birth defects.  The patient understands that monitoring is required including a baseline creatinine and regular CBC testing. In addition, patient must alert us immediately if symptoms of infection or other concerning signs are noted. Gabapentin Pregnancy And Lactation Text: This medication is Pregnancy Category C and isn't considered safe during pregnancy. It is excreted in breast milk. Valtrex Counseling: I discussed with the patient the risks of valacyclovir including but not limited to kidney damage, nausea, vomiting and severe allergy.  The patient understands that if the infection seems to be worsening or is not improving, they are to call. Azithromycin Counseling:  I discussed with the patient the risks of azithromycin including but not limited to GI upset, allergic reaction, drug rash, diarrhea, and yeast infections. Terbinafine Pregnancy And Lactation Text: This medication is Pregnancy Category B and is considered safe during pregnancy. It is also excreted in breast milk and breast feeding isn't recommended. Glycopyrrolate Counseling:  I discussed with the patient the risks of glycopyrrolate including but not limited to skin rash, drowsiness, dry mouth, difficulty urinating, and blurred vision. Minoxidil Pregnancy And Lactation Text: This medication has not been assigned a Pregnancy Risk Category but animal studies failed to show danger with the topical medication. It is unknown if the medication is excreted in breast milk. Bexarotene Pregnancy And Lactation Text: This medication is Pregnancy Category X and should not be given to women who are pregnant or may become pregnant. This medication should not be used if you are breast feeding. Aklief Pregnancy And Lactation Text: It is unknown if this medication is safe to use during pregnancy.  It is unknown if this medication is excreted in breast milk.  Breastfeeding women should use the topical cream on the smallest area of the skin for the shortest time needed while breastfeeding.  Do not apply to nipple and areola. Oxybutynin Pregnancy And Lactation Text: This medication is Pregnancy Category B and is considered safe during pregnancy. It is unknown if it is excreted in breast milk. Cosentyx Counseling:  I discussed with the patient the risks of Cosentyx including but not limited to worsening of Crohn's disease, immunosuppression, allergic reactions and infections.  The patient understands that monitoring is required including a PPD at baseline and must alert us or the primary physician if symptoms of infection or other concerning signs are noted. Detail Level: Zone Eucrisa Counseling: Patient may experience a mild burning sensation during topical application. Eucrisa is not approved in children less than 2 years of age. Sotyktu Pregnancy And Lactation Text: There is insufficient data to evaluate whether or not Sotyktu is safe to use during pregnancy.   It is not known if Sotyktu passes into breast milk and whether or not it is safe to use when breastfeeding.   Prednisone Pregnancy And Lactation Text: This medication is Pregnancy Category C and it isn't know if it is safe during pregnancy. This medication is excreted in breast milk. Doxycycline Counseling:  Patient counseled regarding possible photosensitivity and increased risk for sunburn.  Patient instructed to avoid sunlight, if possible.  When exposed to sunlight, patients should wear protective clothing, sunglasses, and sunscreen.  The patient was instructed to call the office immediately if the following severe adverse effects occur:  hearing changes, easy bruising/bleeding, severe headache, or vision changes.  The patient verbalized understanding of the proper use and possible adverse effects of doxycycline.  All of the patient's questions and concerns were addressed. Finasteride Pregnancy And Lactation Text: This medication is absolutely contraindicated during pregnancy. It is unknown if it is excreted in breast milk. VTAMA Counseling: I discussed with the patient that VTAMA is not for use in the eyes, mouth or mouth. They should call the office if they develop any signs of allergic reactions to VTAMA. The patient verbalized understanding of the proper use and possible adverse effects of VTAMA.  All of the patient's questions and concerns were addressed. Winlevi Counseling:  I discussed with the patient the risks of topical clascoterone including but not limited to erythema, scaling, itching, and stinging. Patient voiced their understanding. Doxycycline Pregnancy And Lactation Text: This medication is Pregnancy Category D and not consider safe during pregnancy. It is also excreted in breast milk but is considered safe for shorter treatment courses. Taltz Counseling: I discussed with the patient the risks of ixekizumab including but not limited to immunosuppression, serious infections, worsening of inflammatory bowel disease and drug reactions.  The patient understands that monitoring is required including a PPD at baseline and must alert us or the primary physician if symptoms of infection or other concerning signs are noted. Fluconazole Pregnancy And Lactation Text: This medication is Pregnancy Category C and it isn't know if it is safe during pregnancy. It is also excreted in breast milk. Propranolol Counseling:  I discussed with the patient the risks of propranolol including but not limited to low heart rate, low blood pressure, low blood sugar, restlessness and increased cold sensitivity. They should call the office if they experience any of these side effects. Rituxan Pregnancy And Lactation Text: This medication is Pregnancy Category C and it isn't know if it is safe during pregnancy. It is unknown if this medication is excreted in breast milk but similar antibodies are known to be excreted. Tazorac Pregnancy And Lactation Text: This medication is not safe during pregnancy. It is unknown if this medication is excreted in breast milk. Qbrexza Counseling:  I discussed with the patient the risks of Qbrexza including but not limited to headache, mydriasis, blurred vision, dry eyes, nasal dryness, dry mouth, dry throat, dry skin, urinary hesitation, and constipation.  Local skin reactions including erythema, burning, stinging, and itching can also occur. Nsaids Pregnancy And Lactation Text: These medications are considered safe up to 30 weeks gestation. It is excreted in breast milk. Valtrex Pregnancy And Lactation Text: this medication is Pregnancy Category B and is considered safe during pregnancy. This medication is not directly found in breast milk but it's metabolite acyclovir is present. Siliq Counseling:  I discussed with the patient the risks of Siliq including but not limited to new or worsening depression, suicidal thoughts and behavior, immunosuppression, malignancy, posterior leukoencephalopathy syndrome, and serious infections.  The patient understands that monitoring is required including a PPD at baseline and must alert us or the primary physician if symptoms of infection or other concerning signs are noted. There is also a special program designed to monitor depression which is required with Siliq. Cantharidin Pregnancy And Lactation Text: The use of this medication during pregnancy or lactation is not recommended as there is insufficient data. Isotretinoin Counseling: Patient should get monthly blood tests, not donate blood, not drive at night if vision affected, not share medication, and not undergo elective surgery for 6 months after tx completed. Side effects reviewed, pt to contact office should one occur. Glycopyrrolate Pregnancy And Lactation Text: This medication is Pregnancy Category B and is considered safe during pregnancy. It is unknown if it is excreted breast milk. Cyclophosphamide Counseling:  I discussed with the patient the risks of cyclophosphamide including but not limited to hair loss, hormonal abnormalities, decreased fertility, abdominal pain, diarrhea, nausea and vomiting, bone marrow suppression and infection. The patient understands that monitoring is required while taking this medication. Qbrexza Pregnancy And Lactation Text: There is no available data on Qbrexza use in pregnant women.  There is no available data on Qbrexza use in lactation. Azithromycin Pregnancy And Lactation Text: This medication is considered safe during pregnancy and is also secreted in breast milk. Clofazimine Counseling:  I discussed with the patient the risks of clofazimine including but not limited to skin and eye pigmentation, liver damage, nausea/vomiting, gastrointestinal bleeding and allergy. Albendazole Pregnancy And Lactation Text: This medication is Pregnancy Category C and it isn't known if it is safe during pregnancy. It is also excreted in breast milk. Birth Control Pills Counseling: Birth Control Pill Counseling: I discussed with the patient the potential side effects of OCPs including but not limited to increased risk of stroke, heart attack, thrombophlebitis, deep venous thrombosis, hepatic adenomas, breast changes, GI upset, headaches, and depression.  The patient verbalized understanding of the proper use and possible adverse effects of OCPs. All of the patient's questions and concerns were addressed. Azelaic Acid Counseling: Patient counseled that medicine may cause skin irritation and to avoid applying near the eyes.  In the event of skin irritation, the patient was advised to reduce the amount of the drug applied or use it less frequently.   The patient verbalized understanding of the proper use and possible adverse effects of azelaic acid.  All of the patient's questions and concerns were addressed. Xeljanz Counseling: I discussed with the patient the risks of Xeljanz therapy including increased risk of infection, liver issues, headache, diarrhea, or cold symptoms. Live vaccines should be avoided. They were instructed to call if they have any problems. Mirvaso Counseling: Mirvaso is a topical medication which can decrease superficial blood flow where applied. Side effects are uncommon and include stinging, redness and allergic reactions. Libtayo Counseling- I discussed with the patient the risks of Libtayo including but not limited to nausea, vomiting, diarrhea, and bone or muscle pain.  The patient verbalized understanding of the proper use and possible adverse effects of Libtayo.  All of the patient's questions and concerns were addressed. Hydroquinone Counseling:  Patient advised that medication may result in skin irritation, lightening (hypopigmentation), dryness, and burning.  In the event of skin irritation, the patient was advised to reduce the amount of the drug applied or use it less frequently.  Rarely, spots that are treated with hydroquinone can become darker (pseudoochronosis).  Should this occur, patient instructed to stop medication and call the office. The patient verbalized understanding of the proper use and possible adverse effects of hydroquinone.  All of the patient's questions and concerns were addressed. Include Pregnancy/Lactation Warning?: No Propranolol Pregnancy And Lactation Text: This medication is Pregnancy Category C and it isn't known if it is safe during pregnancy. It is excreted in breast milk. Enbrel Counseling:  I discussed with the patient the risks of etanercept including but not limited to myelosuppression, immunosuppression, autoimmune hepatitis, demyelinating diseases, lymphoma, and infections.  The patient understands that monitoring is required including a PPD at baseline and must alert us or the primary physician if symptoms of infection or other concerning signs are noted. Winlevi Pregnancy And Lactation Text: This medication is considered safe during pregnancy and breastfeeding. Cimetidine Counseling:  I discussed with the patient the risks of Cimetidine including but not limited to gynecomastia, headache, diarrhea, nausea, drowsiness, arrhythmias, pancreatitis, skin rashes, psychosis, bone marrow suppression and kidney toxicity. Ivermectin Counseling:  Patient instructed to take medication on an empty stomach with a full glass of water.  Patient informed of potential adverse effects including but not limited to nausea, diarrhea, dizziness, itching, and swelling of the extremities or lymph nodes.  The patient verbalized understanding of the proper use and possible adverse effects of ivermectin.  All of the patient's questions and concerns were addressed. Cibinqo Counseling: I discussed with the patient the risks of Cibinqo therapy including but not limited to common cold, nausea, headache, cold sores, increased blood CPK levels, dizziness, UTIs, fatigue, acne, and vomitting. Live vaccines should be avoided.  This medication has been linked to serious infections; higher rate of mortality; malignancy and lymphoproliferative disorders; major adverse cardiovascular events; thrombosis; thrombocytopenia and lymphopenia; lipid elevations; and retinal detachment. Vtama Pregnancy And Lactation Text: It is unknown if this medication can cause problems during pregnancy and breastfeeding. Olanzapine Counseling- I discussed with the patient the common side effects of olanzapine including but are not limited to: lack of energy, dry mouth, increased appetite, sleepiness, tremor, constipation, dizziness, changes in behavior, or restlessness.  Explained that teenagers are more likely to experience headaches, abdominal pain, pain in the arms or legs, tiredness, and sleepiness.  Serious side effects include but are not limited: increased risk of death in elderly patients who are confused, have memory loss, or dementia-related psychosis; hyperglycemia; increased cholesterol and triglycerides; and weight gain. Topical Clindamycin Counseling: Patient counseled that this medication may cause skin irritation or allergic reactions.  In the event of skin irritation, the patient was advised to reduce the amount of the drug applied or use it less frequently.   The patient verbalized understanding of the proper use and possible adverse effects of clindamycin.  All of the patient's questions and concerns were addressed. Rifampin Counseling: I discussed with the patient the risks of rifampin including but not limited to liver damage, kidney damage, red-orange body fluids, nausea/vomiting and severe allergy. Griseofulvin Counseling:  I discussed with the patient the risks of griseofulvin including but not limited to photosensitivity, cytopenia, liver damage, nausea/vomiting and severe allergy.  The patient understands that this medication is best absorbed when taken with a fatty meal (e.g., ice cream or french fries). Hydroxychloroquine Counseling:  I discussed with the patient that a baseline ophthalmologic exam is needed at the start of therapy and every year thereafter while on therapy. A CBC may also be warranted for monitoring.  The side effects of this medication were discussed with the patient, including but not limited to agranulocytosis, aplastic anemia, seizures, rashes, retinopathy, and liver toxicity. Patient instructed to call the office should any adverse effect occur.  The patient verbalized understanding of the proper use and possible adverse effects of Plaquenil.  All the patient's questions and concerns were addressed. Isotretinoin Pregnancy And Lactation Text: This medication is Pregnancy Category X and is considered extremely dangerous during pregnancy. It is unknown if it is excreted in breast milk. 5-Fu Counseling: 5-Fluorouracil Counseling:  I discussed with the patient the risks of 5-fluorouracil including but not limited to erythema, scaling, itching, weeping, crusting, and pain. Cibinqo Pregnancy And Lactation Text: It is unknown if this medication will adversely affect pregnancy or breast feeding.  You should not take this medication if you are currently pregnant or planning a pregnancy or while breastfeeding. Bactrim Counseling:  I discussed with the patient the risks of sulfa antibiotics including but not limited to GI upset, allergic reaction, drug rash, diarrhea, dizziness, photosensitivity, and yeast infections.  Rarely, more serious reactions can occur including but not limited to aplastic anemia, agranulocytosis, methemoglobinemia, blood dyscrasias, liver or kidney failure, lung infiltrates or desquamative/blistering drug rashes. Cyclophosphamide Pregnancy And Lactation Text: This medication is Pregnancy Category D and it isn't considered safe during pregnancy. This medication is excreted in breast milk. Rhofade Counseling: Rhofade is a topical medication which can decrease superficial blood flow where applied. Side effects are uncommon and include stinging, redness and allergic reactions. Xelcamrynz Pregnancy And Lactation Text: This medication is Pregnancy Category D and is not considered safe during pregnancy.  The risk during breast feeding is also uncertain. Libtayo Pregnancy And Lactation Text: This medication is contraindicated in pregnancy and when breast feeding. Erythromycin Counseling:  I discussed with the patient the risks of erythromycin including but not limited to GI upset, allergic reaction, drug rash, diarrhea, increase in liver enzymes, and yeast infections. Birth Control Pills Pregnancy And Lactation Text: This medication should be avoided if pregnant and for the first 30 days post-partum. Azelaic Acid Pregnancy And Lactation Text: This medication is considered safe during pregnancy and breast feeding. Dupixent Counseling: I discussed with the patient the risks of dupilumab including but not limited to eye infection and irritation, cold sores, injection site reactions, worsening of asthma, allergic reactions and increased risk of parasitic infection.  Live vaccines should be avoided while taking dupilumab. Dupilumab will also interact with certain medications such as warfarin and cyclosporine. The patient understands that monitoring is required and they must alert us or the primary physician if symptoms of infection or other concerning signs are noted. Mirvaso Pregnancy And Lactation Text: This medication has not been assigned a Pregnancy Risk Category. It is unknown if the medication is excreted in breast milk. Dupixent Pregnancy And Lactation Text: This medication likely crosses the placenta but the risk for the fetus is uncertain. This medication is excreted in breast milk. Opzelura Counseling:  I discussed with the patient the risks of Opzelura including but not limited to nasopharngitis, bronchitis, ear infection, eosinophila, hives, diarrhea, folliculitis, tonsillitis, and rhinorrhea.  Taken orally, this medication has been linked to serious infections; higher rate of mortality; malignancy and lymphoproliferative disorders; major adverse cardiovascular events; thrombosis; thrombocytopenia, anemia, and neutropenia; and lipid elevations. SSKI Counseling:  I discussed with the patient the risks of SSKI including but not limited to thyroid abnormalities, metallic taste, GI upset, fever, headache, acne, arthralgias, paraesthesias, lymphadenopathy, easy bleeding, arrhythmias, and allergic reaction. Colchicine Counseling:  Patient counseled regarding adverse effects including but not limited to stomach upset (nausea, vomiting, stomach pain, or diarrhea).  Patient instructed to limit alcohol consumption while taking this medication.  Colchicine may reduce blood counts especially with prolonged use.  The patient understands that monitoring of kidney function and blood counts may be required, especially at baseline. The patient verbalized understanding of the proper use and possible adverse effects of colchicine.  All of the patient's questions and concerns were addressed. Rifampin Pregnancy And Lactation Text: This medication is Pregnancy Category C and it isn't know if it is safe during pregnancy. It is also excreted in breast milk and should not be used if you are breast feeding. Griseofulvin Pregnancy And Lactation Text: This medication is Pregnancy Category X and is known to cause serious birth defects. It is unknown if this medication is excreted in breast milk but breast feeding should be avoided. Olanzapine Pregnancy And Lactation Text: This medication is pregnancy category C.   There are no adequate and well controlled trials with olanzapine in pregnant females.  Olanzapine should be used during pregnancy only if the potential benefit justifies the potential risk to the fetus.   In a study in lactating healthy women, olanzapine was excreted in breast milk.  It is recommended that women taking olanzapine should not breast feed. Zyclara Counseling:  I discussed with the patient the risks of imiquimod including but not limited to erythema, scaling, itching, weeping, crusting, and pain.  Patient understands that the inflammatory response to imiquimod is variable from person to person and was educated regarded proper titration schedule.  If flu-like symptoms develop, patient knows to discontinue the medication and contact us. Topical Ketoconazole Counseling: Patient counseled that this medication may cause skin irritation or allergic reactions.  In the event of skin irritation, the patient was advised to reduce the amount of the drug applied or use it less frequently.   The patient verbalized understanding of the proper use and possible adverse effects of ketoconazole.  All of the patient's questions and concerns were addressed. High Dose Vitamin A Counseling: Side effects reviewed, pt to contact office should one occur. Sarecycline Counseling: Patient advised regarding possible photosensitivity and discoloration of the teeth, skin, lips, tongue and gums.  Patient instructed to avoid sunlight, if possible.  When exposed to sunlight, patients should wear protective clothing, sunglasses, and sunscreen.  The patient was instructed to call the office immediately if the following severe adverse effects occur:  hearing changes, easy bruising/bleeding, severe headache, or vision changes.  The patient verbalized understanding of the proper use and possible adverse effects of sarecycline.  All of the patient's questions and concerns were addressed. Acitretin Counseling:  I discussed with the patient the risks of acitretin including but not limited to hair loss, dry lips/skin/eyes, liver damage, hyperlipidemia, depression/suicidal ideation, photosensitivity.  Serious rare side effects can include but are not limited to pancreatitis, pseudotumor cerebri, bony changes, clot formation/stroke/heart attack.  Patient understands that alcohol is contraindicated since it can result in liver toxicity and significantly prolong the elimination of the drug by many years. Cyclosporine Counseling:  I discussed with the patient the risks of cyclosporine including but not limited to hypertension, gingival hyperplasia,myelosuppression, immunosuppression, liver damage, kidney damage, neurotoxicity, lymphoma, and serious infections. The patient understands that monitoring is required including baseline blood pressure, CBC, CMP, lipid panel and uric acid, and then 1-2 times monthly CMP and blood pressure. Olumiant Counseling: I discussed with the patient the risks of Olumiant therapy including but not limited to upper respiratory tract infections, shingles, cold sores, and nausea. Live vaccines should be avoided.  This medication has been linked to serious infections; higher rate of mortality; malignancy and lymphoproliferative disorders; major adverse cardiovascular events; thrombosis; gastrointestinal perforations; neutropenia; lymphopenia; anemia; liver enzyme elevations; and lipid elevations. Bactrim Pregnancy And Lactation Text: This medication is Pregnancy Category D and is known to cause fetal risk.  It is also excreted in breast milk. Simponi Counseling:  I discussed with the patient the risks of golimumab including but not limited to myelosuppression, immunosuppression, autoimmune hepatitis, demyelinating diseases, lymphoma, and serious infections.  The patient understands that monitoring is required including a PPD at baseline and must alert us or the primary physician if symptoms of infection or other concerning signs are noted. Benzoyl Peroxide Counseling: Patient counseled that medicine may cause skin irritation and bleach clothing.  In the event of skin irritation, the patient was advised to reduce the amount of the drug applied or use it less frequently.   The patient verbalized understanding of the proper use and possible adverse effects of benzoyl peroxide.  All of the patient's questions and concerns were addressed. Tremfya Counseling: I discussed with the patient the risks of guselkumab including but not limited to immunosuppression, serious infections, worsening of inflammatory bowel disease and drug reactions.  The patient understands that monitoring is required including a PPD at baseline and must alert us or the primary physician if symptoms of infection or other concerning signs are noted. Odomzo Counseling- I discussed with the patient the risks of Odomzo including but not limited to nausea, vomiting, diarrhea, constipation, weight loss, changes in the sense of taste, decreased appetite, muscle spasms, and hair loss.  The patient verbalized understanding of the proper use and possible adverse effects of Odomzo.  All of the patient's questions and concerns were addressed. Erythromycin Pregnancy And Lactation Text: This medication is Pregnancy Category B and is considered safe during pregnancy. It is also excreted in breast milk. Spironolactone Counseling: Patient advised regarding risks of diarrhea, abdominal pain, hyperkalemia, birth defects (for female patients), liver toxicity and renal toxicity. The patient may need blood work to monitor liver and kidney function and potassium levels while on therapy. The patient verbalized understanding of the proper use and possible adverse effects of spironolactone.  All of the patient's questions and concerns were addressed. Hydroxychloroquine Pregnancy And Lactation Text: This medication has been shown to cause fetal harm but it isn't assigned a Pregnancy Risk Category. There are small amounts excreted in breast milk. Ilumya Counseling: I discussed with the patient the risks of tildrakizumab including but not limited to immunosuppression, malignancy, posterior leukoencephalopathy syndrome, and serious infections.  The patient understands that monitoring is required including a PPD at baseline and must alert us or the primary physician if symptoms of infection or other concerning signs are noted. Metronidazole Counseling:  I discussed with the patient the risks of metronidazole including but not limited to seizures, nausea/vomiting, a metallic taste in the mouth, nausea/vomiting and severe allergy. Humira Counseling:  I discussed with the patient the risks of adalimumab including but not limited to myelosuppression, immunosuppression, autoimmune hepatitis, demyelinating diseases, lymphoma, and serious infections.  The patient understands that monitoring is required including a PPD at baseline and must alert us or the primary physician if symptoms of infection or other concerning signs are noted. Sski Pregnancy And Lactation Text: This medication is Pregnancy Category D and isn't considered safe during pregnancy. It is excreted in breast milk. Opzelura Pregnancy And Lactation Text: There is insufficient data to evaluate drug-associated risk for major birth defects, miscarriage, or other adverse maternal or fetal outcomes.  There is a pregnancy registry that monitors pregnancy outcomes in pregnant persons exposed to the medication during pregnancy.  It is unknown if this medication is excreted in breast milk.  Do not breastfeed during treatment and for about 4 weeks after the last dose. Oral Minoxidil Counseling- I discussed with the patient the risks of oral minoxidil including but not limited to shortness of breath, swelling of the feet or ankles, dizziness, lightheadedness, unwanted hair growth and allergic reaction.  The patient verbalized understanding of the proper use and possible adverse effects of oral minoxidil.  All of the patient's questions and concerns were addressed. Itraconazole Counseling:  I discussed with the patient the risks of itraconazole including but not limited to liver damage, nausea/vomiting, neuropathy, and severe allergy.  The patient understands that this medication is best absorbed when taken with acidic beverages such as non-diet cola or ginger ale.  The patient understands that monitoring is required including baseline LFTs and repeat LFTs at intervals.  The patient understands that they are to contact us or the primary physician if concerning signs are noted. Doxepin Counseling:  Patient advised that the medication is sedating and not to drive a car after taking this medication. Patient informed of potential adverse effects including but not limited to dry mouth, urinary retention, and blurry vision.  The patient verbalized understanding of the proper use and possible adverse effects of doxepin.  All of the patient's questions and concerns were addressed. Imiquimod Counseling:  I discussed with the patient the risks of imiquimod including but not limited to erythema, scaling, itching, weeping, crusting, and pain.  Patient understands that the inflammatory response to imiquimod is variable from person to person and was educated regarded proper titration schedule.  If flu-like symptoms develop, patient knows to discontinue the medication and contact us. Drysol Counseling:  I discussed with the patient the risks of drysol/aluminum chloride including but not limited to skin rash, itching, irritation, burning. High Dose Vitamin A Pregnancy And Lactation Text: High dose vitamin A therapy is contraindicated during pregnancy and breast feeding. Olumiant Pregnancy And Lactation Text: Based on animal studies, Olumiant may cause embryo-fetal harm when administered to pregnant women.  The medication should not be used in pregnancy.  Breastfeeding is not recommended during treatment. Spironolactone Pregnancy And Lactation Text: This medication can cause feminization of the male fetus and should be avoided during pregnancy. The active metabolite is also found in breast milk. Cephalexin Counseling: I counseled the patient regarding use of cephalexin as an antibiotic for prophylactic and/or therapeutic purposes. Cephalexin (commonly prescribed under brand name Keflex) is a cephalosporin antibiotic which is active against numerous classes of bacteria, including most skin bacteria. Side effects may include nausea, diarrhea, gastrointestinal upset, rash, hives, yeast infections, and in rare cases, hepatitis, kidney disease, seizures, fever, confusion, neurologic symptoms, and others. Patients with severe allergies to penicillin medications are cautioned that there is about a 10% incidence of cross-reactivity with cephalosporins. When possible, patients with penicillin allergies should use alternatives to cephalosporins for antibiotic therapy. Adbry Counseling: I discussed with the patient the risks of tralokinumab including but not limited to eye infection and irritation, cold sores, injection site reactions, worsening of asthma, allergic reactions and increased risk of parasitic infection.  Live vaccines should be avoided while taking tralokinumab. The patient understands that monitoring is required and they must alert us or the primary physician if symptoms of infection or other concerning signs are noted. Oral Minoxidil Pregnancy And Lactation Text: This medication should only be used when clearly needed if you are pregnant, attempting to become pregnant or breast feeding. Benzoyl Peroxide Pregnancy And Lactation Text: This medication is Pregnancy Category C. It is unknown if benzoyl peroxide is excreted in breast milk. Solaraze Counseling:  I discussed with the patient the risks of Solaraze including but not limited to erythema, scaling, itching, weeping, crusting, and pain. Low Dose Naltrexone Counseling- I discussed with the patient the potential risks and side effects of low dose naltrexone including but not limited to: more vivid dreams, headaches, nausea, vomiting, abdominal pain, fatigue, dizziness, and anxiety. Xolair Counseling:  Patient informed of potential adverse effects including but not limited to fever, muscle aches, rash and allergic reactions.  The patient verbalized understanding of the proper use and possible adverse effects of Xolair.  All of the patient's questions and concerns were addressed. Dapsone Counseling: I discussed with the patient the risks of dapsone including but not limited to hemolytic anemia, agranulocytosis, rashes, methemoglobinemia, kidney failure, peripheral neuropathy, headaches, GI upset, and liver toxicity.  Patients who start dapsone require monitoring including baseline LFTs and weekly CBCs for the first month, then every month thereafter.  The patient verbalized understanding of the proper use and possible adverse effects of dapsone.  All of the patient's questions and concerns were addressed. Opioid Counseling: I discussed with the patient the potential side effects of opioids including but not limited to addiction, altered mental status, and depression. I stressed avoiding alcohol, benzodiazepines, muscle relaxants and sleep aids unless specifically okayed by a physician. The patient verbalized understanding of the proper use and possible adverse effects of opioids. All of the patient's questions and concerns were addressed. They were instructed to flush the remaining pills down the toilet if they did not need them for pain. Low Dose Naltrexone Pregnancy And Lactation Text: Naltrexone is pregnancy category C.  There have been no adequate and well-controlled studies in pregnant women.  It should be used in pregnancy only if the potential benefit justifies the potential risk to the fetus.   Limited data indicates that naltrexone is minimally excreted into breastmilk. Picato Counseling:  I discussed with the patient the risks of Picato including but not limited to erythema, scaling, itching, weeping, crusting, and pain. Carac Counseling:  I discussed with the patient the risks of Carac including but not limited to erythema, scaling, itching, weeping, crusting, and pain. Metronidazole Pregnancy And Lactation Text: This medication is Pregnancy Category B and considered safe during pregnancy.  It is also excreted in breast milk. Doxepin Pregnancy And Lactation Text: This medication is Pregnancy Category C and it isn't known if it is safe during pregnancy. It is also excreted in breast milk and breast feeding isn't recommended. Acitretin Pregnancy And Lactation Text: This medication is Pregnancy Category X and should not be given to women who are pregnant or may become pregnant in the future. This medication is excreted in breast milk. Thalidomide Counseling: I discussed with the patient the risks of thalidomide including but not limited to birth defects, anxiety, weakness, chest pain, dizziness, cough and severe allergy. Klisyri Counseling:  I discussed with the patient the risks of Klisyri including but not limited to erythema, scaling, itching, weeping, crusting, and pain. Rinvoq Counseling: I discussed with the patient the risks of Rinvoq therapy including but not limited to upper respiratory tract infections, shingles, cold sores, bronchitis, nausea, cough, fever, acne, and headache. Live vaccines should be avoided.  This medication has been linked to serious infections; higher rate of mortality; malignancy and lymphoproliferative disorders; major adverse cardiovascular events; thrombosis; thrombocytopenia, anemia, and neutropenia; lipid elevations; liver enzyme elevations; and gastrointestinal perforations. Skyrizi Counseling: I discussed with the patient the risks of risankizumab-rzaa including but not limited to immunosuppression, and serious infections.  The patient understands that monitoring is required including a PPD at baseline and must alert us or the primary physician if symptoms of infection or other concerning signs are noted. Cephalexin Pregnancy And Lactation Text: This medication is Pregnancy Category B and considered safe during pregnancy.  It is also excreted in breast milk but can be used safely for shorter doses. Methotrexate Counseling:  Patient counseled regarding adverse effects of methotrexate including but not limited to nausea, vomiting, abnormalities in liver function tests. Patients may develop mouth sores, rash, diarrhea, and abnormalities in blood counts. The patient understands that monitoring is required including LFT's and blood counts.  There is a rare possibility of scarring of the liver and lung problems that can occur when taking methotrexate. Persistent nausea, loss of appetite, pale stools, dark urine, cough, and shortness of breath should be reported immediately. Patient advised to discontinue methotrexate treatment at least three months before attempting to become pregnant.  I discussed the need for folate supplements while taking methotrexate.  These supplements can decrease side effects during methotrexate treatment. The patient verbalized understanding of the proper use and possible adverse effects of methotrexate.  All of the patient's questions and concerns were addressed. Dutasteride Male Counseling: Dustasteride Counseling:  I discussed with the patient the risks of use of dutasteride including but not limited to decreased libido, decreased ejaculate volume, and gynecomastia. Women who can become pregnant should not handle medication.  All of the patient's questions and concerns were addressed. Topical Sulfur Applications Counseling: Topical Sulfur Counseling: Patient counseled that this medication may cause skin irritation or allergic reactions.  In the event of skin irritation, the patient was advised to reduce the amount of the drug applied or use it less frequently.   The patient verbalized understanding of the proper use and possible adverse effects of topical sulfur application.  All of the patient's questions and concerns were addressed. Tetracycline Counseling: Patient counseled regarding possible photosensitivity and increased risk for sunburn.  Patient instructed to avoid sunlight, if possible.  When exposed to sunlight, patients should wear protective clothing, sunglasses, and sunscreen.  The patient was instructed to call the office immediately if the following severe adverse effects occur:  hearing changes, easy bruising/bleeding, severe headache, or vision changes.  The patient verbalized understanding of the proper use and possible adverse effects of tetracycline.  All of the patient's questions and concerns were addressed. Patient understands to avoid pregnancy while on therapy due to potential birth defects. Adbry Pregnancy And Lactation Text: It is unknown if this medication will adversely affect pregnancy or breast feeding. Otezla Counseling: The side effects of Otezla were discussed with the patient, including but not limited to worsening or new depression, weight loss, diarrhea, nausea, upper respiratory tract infection, and headache. Patient instructed to call the office should any adverse effect occur.  The patient verbalized understanding of the proper use and possible adverse effects of Otezla.  All the patient's questions and concerns were addressed. Solaraze Pregnancy And Lactation Text: This medication is Pregnancy Category B and is considered safe. There is some data to suggest avoiding during the third trimester. It is unknown if this medication is excreted in breast milk.

## 2024-06-11 ENCOUNTER — TRANSFERRED RECORDS (OUTPATIENT)
Dept: MULTI SPECIALTY CLINIC | Facility: CLINIC | Age: 44
End: 2024-06-11

## 2024-06-11 LAB — RETINOPATHY: NORMAL

## 2024-07-09 ENCOUNTER — TELEPHONE (OUTPATIENT)
Dept: FAMILY MEDICINE | Facility: CLINIC | Age: 44
End: 2024-07-09
Payer: COMMERCIAL

## 2024-07-09 NOTE — TELEPHONE ENCOUNTER
Patient Quality Outreach    Patient is due for the following:   Diabetes -  A1C  Breast Cancer Screening - Mammogram    Next Steps:   Schedule a office visit for Diabetes follow up    Type of outreach:    Phone, spoke to patient/parent. Scheduled    Next Steps:  Reach out within 90 days via Phone.    Max number of attempts reached: No. Will try again in 90 days if patient still on fail list.    Questions for provider review:    None           Rohan Catherine  Chart routed to Care Team.

## 2024-12-18 ENCOUNTER — PATIENT OUTREACH (OUTPATIENT)
Dept: CARE COORDINATION | Facility: CLINIC | Age: 44
End: 2024-12-18
Payer: COMMERCIAL

## 2025-04-07 ENCOUNTER — PATIENT OUTREACH (OUTPATIENT)
Dept: CARE COORDINATION | Facility: CLINIC | Age: 45
End: 2025-04-07
Payer: COMMERCIAL

## 2025-04-07 NOTE — PROGRESS NOTES
Clinic Care Coordination Contact  [facility]/mail  Pattie  ID [id number]    Clinical Data: Care Coordinator Outreach    Outreach Documentation Number of Outreach Attempt   4/7/2025   8:58 AM 1     Left message on patient's voicemail with call back information and requested return call.    Plan: Care Coordinator will try to reach patient again in 1-2 business days.    Anne Wu  Community Health Worker   Clinic Care Coordination  [facility]    Phone: [phone]

## 2025-04-08 NOTE — PROGRESS NOTES
Clinic Care Coordination Contact  Community Health Worker Initial Outreach  Spoke with patient - [place]  ID [id number]    CHW Initial Information Gathering:  Referral Source: PCP  Preferred Hospital: [facility]  [phone]  Preferred Urgent Care: [facility] [place], [phone]  Current living arrangement:: I live in a private home with family  Type of residence:: Private home - stairs  Community Resources: Financial/Insurance  Supplies Currently Used at Home: Diabetic Supplies  Equipment Currently Used at Home: none  Informal Support system:: Family, Children  No PCP office visit in Past Year: No  Transportation means:: Family, Regular car  CHW Additional Questions  If ED/Hospital discharge, follow-up appointment scheduled as recommended?: N/A  Medication changes made following ED/Hospital discharge?: N/A  MyChart active?: No  Patient agreeable to assistance with activating MyChart?: No    Patient accepts CC: Yes. Patient scheduled for assessment with Magda Mcmahon CCS on 4/24/25 at 10am. Patient noted desire to discuss support with letter from Social Security Office regarding overpayment.    dropped off line during call. Due to long hold for another [facility] , patient preferred her daughter Wilfrido Arteaga help interpret.   Patient will bring letter to her in-person appointment with ADOLPH Wu  Community Health Worker   Clinic Care Coordination  [facility]    Phone: [phone]

## 2025-04-24 ENCOUNTER — ALLIED HEALTH/NURSE VISIT (OUTPATIENT)
Dept: NURSING | Facility: CLINIC | Age: 45
End: 2025-04-24
Payer: COMMERCIAL

## 2025-04-24 DIAGNOSIS — Z71.89 COMPLEX CARE COORDINATION: Primary | ICD-10-CM

## 2025-04-24 PROCEDURE — 99207 PR NO CHARGE LOS: CPT

## 2025-04-24 NOTE — LETTER
[facility]  Patient Centered Plan of Care  About Me:        Patient Name:  Beh Meh    YOB: 1980  Age:         45 year old   Raf MRN:    [id number] Telephone Information:  Home Phone [phone]   Mobile [phone]       Address:  [address]  [place] [address] Email address:  No e-mail address on record      Emergency Contact(s)    Name Relationship Lgl Grd Work Phone Home Phone Mobile Phone   1. TAMICA,KASSIEY Son   [phone]   2. FATHER CASSANDRA Other    [phone]   3. TAMICA,OO Friend   [phone]           Primary language:  Tamanna      needed? Yes   [facility] [employer] [facility]:  [phone] op. 1  Other communication barriers:No data recorded  Preferred Method of Communication:     Current living arrangement: I live in a private home with family    Mobility Status/ Medical Equipment: No data recorded      Health Maintenance  Health Maintenance Reviewed: No data recorded    My Access Plan  Medical Emergency 911   Primary Clinic Line [facility] [phone]   24 Hour Appointment Line [phone] or  [phone]) (toll-free)   24 Hour Nurse Line [phone] (toll-free)   Preferred Urgent Care [facility] [phone]     Preferred Hospital [facility]  [phone]     Preferred Pharmacy [facility] - [address]     Behavioral Health Crisis Line The National Suicide Prevention Lifeline at 1-191.824.4688 or Text/Call 368           My Care Team Members  Patient Care Team         Relationship Specialty Notifications Start End    Tk Jay MD PCP - General Family Medicine  12/3/21     Phone: [phone] Fax: [phone]         [address] [place] [address]    Tk Jay MD Assigned PCP   12/5/21     Phone: [phone] Fax: [phone]         [address] [place] [address]    Magda Mcmahon, [facility] Lead  Care Coordinator  Admissions 4/8/25                 My Care Plans  Self Management and Treatment Plan    Care Plan  Care Plan: General       Problem: HP GENERAL PROBLEM       Goal: Resolve overpayment       Start Date: 4/24/2025    This Visit's Progress: 10%    Priority: High    Note:     Barriers: language  Strengths: motivated to resolve overpayment  Patient expressed understanding of goal: yes  Action steps to achieve this goal:  1. I will reach out to [facility] when I receive mail from social security  2. I will schedule an appointment with [facility] if I don't understand letter  3. I will reach out to [facility] if I need further support with this goal                               Action Plans on File:                       Advance Care Plans/Directives:   Advanced Care Plan/Directives on file: No data recorded  Discussed with patient/caregiver(s): No data recorded           My Medical and Care Information  Problem List   Patient Active Problem List   Diagnosis    Chronic midline low back pain without sciatica    Chronic viral hepatitis B without delta agent and without coma (H)    Gastric adenocarcinoma (H)    Type 2 diabetes mellitus treated without insulin (H)    Constipation, unspecified constipation type      Current Medications:  Please refer to the most recent medication list provided to you by your medical team and reach out to your provider with any questions or to make any corrections.    Care Coordination Start Date: 4/4/2025   Frequency of Care Coordination: No data recorded   Form Last Updated: 06/04/2025

## 2025-06-04 NOTE — PROGRESS NOTES
Clinic Care Coordination Contact  Clinic Care Coordination Contact  OUTREACH    Referral Information:  Referral Source: PCP         Chief Complaint   Patient presents with    Clinic Care Coordination - Initial        Universal Utilization: appropriate     Utilization      No Show Count (past year)  2             ED Visits  0             Hospital Admissions  0                    Current as of: 5/30/2025  4:01 AM                Clinical Concerns:  Current Medical Concerns:  none    Current Behavioral Concerns: none    Education Provided to patient: CCC education, support and resources      Health Maintenance Reviewed:    Clinical Pathway: None    Medication Management:  Medication review status: Medications reviewed and no changes reported per patient.                    Living Situation:  Current living arrangement:: I live in a private home with family  Type of residence:: Private home - stairs    Lifestyle & Psychosocial Needs:    Social Drivers of Health     Food Insecurity: Low Risk  (4/4/2025)    Food Insecurity     Within the past 12 months, did you worry that your food would run out before you got money to buy more?: No     Within the past 12 months, did the food you bought just not last and you didn t have money to get more?: No   Depression: Not at risk (1/3/2025)    PHQ-2     PHQ-2 Score: 2   Housing Stability: Low Risk  (4/4/2025)    Housing Stability     Do you have housing? : Yes     Are you worried about losing your housing?: No   Tobacco Use: Low Risk  (4/4/2025)    Patient History     Smoking Tobacco Use: Never     Smokeless Tobacco Use: Never     Passive Exposure: Never   Financial Resource Strain: Low Risk  (4/4/2025)    Financial Resource Strain     Within the past 12 months, have you or your family members you live with been unable to get utilities (heat, electricity) when it was really needed?: No   Alcohol Use: Not At Risk (3/2/2020)    AUDIT-C     Frequency of Alcohol Consumption: Never      Average Number of Drinks: Not on file     Frequency of Binge Drinking: Not on file   Transportation Needs: Low Risk  (4/4/2025)    Transportation Needs     Within the past 12 months, has lack of transportation kept you from medical appointments, getting your medicines, non-medical meetings or appointments, work, or from getting things that you need?: No   Physical Activity: Insufficiently Active (4/4/2025)    Exercise Vital Sign     Days of Exercise per Week: 2 days     Minutes of Exercise per Session: 30 min   Interpersonal Safety: Low Risk  (1/3/2025)    Interpersonal Safety     Do you feel physically and emotionally safe where you currently live?: Yes     Within the past 12 months, have you been hit, slapped, kicked or otherwise physically hurt by someone?: No     Within the past 12 months, have you been humiliated or emotionally abused in other ways by your partner or ex-partner?: No   Stress: No Stress Concern Present (4/4/2025)    [employer] - Occupational Stress Questionnaire     Feeling of Stress : Only a little   Social Connections: Unknown (4/4/2025)    Social Connection and Isolation Panel [NHANES]     Frequency of Communication with Friends and Family: Not on file     Frequency of Social Gatherings with Friends and Family: Never     Attends Jewish Services: Not on file     Active Member of Clubs or Organizations: Not on file     Attends Club or Organization Meetings: Not on file     Marital Status: Not on file   Health Literacy: Not on file              Jewish or spiritual beliefs that impact treatment:: No  Mental health DX:: No  Mental health management concern (GOAL):: No  Informal Support system:: Family, Children      Resources and Interventions:  Current Resources:      Community Resources: Financial/Insurance  Supplies Currently Used at Home: Diabetic Supplies  Equipment Currently Used at Home: none  Employment Status: disabled              Referrals Placed:  None     Care Plan:  Care Plan: General       Problem: HP GENERAL PROBLEM       Goal: Resolve overpayment       Start Date: 4/24/2025    This Visit's Progress: 10%    Priority: High    Note:     Barriers: language  Strengths: motivated to resolve overpayment  Patient expressed understanding of goal: yes  Action steps to achieve this goal:  1. I will reach out to [facility] when I receive mail from social security  2. I will schedule an appointment with [facility] if I don't understand letter  3. I will reach out to [facility] if I need further support with this goal                               Patient/Caregiver understanding: yes           Plan: CCSW, pt and Tamanna  ID [id number] completed assessment. CCSW and pt reviewed overpayment from social security. Completed social security repayment waiver. Pt will, update CCC if/when she receives any correspondence from social security.     Magda Mcmahon, MSW, SW  Social Work Care Coordinator

## 2025-06-17 ENCOUNTER — PATIENT OUTREACH (OUTPATIENT)
Dept: CARE COORDINATION | Facility: CLINIC | Age: 45
End: 2025-06-17
Payer: COMMERCIAL

## 2025-06-17 NOTE — PROGRESS NOTES
Clinic Care Coordination Contact  [facility]/Teresa Nolen  ID Christine Nash     Clinical Data: Care Coordinator Outreach    Outreach Documentation Number of Outreach Attempt   6/17/2025  12:45 PM 1     Unable to leave a message due to: Voicemail is not set up.    Plan: Care Coordinator will try to reach patient again in 10 business days.    Anne Wu  Community Health Worker   Clinic Care Coordination  [facility]    Phone: [phone]

## 2025-07-01 ENCOUNTER — PATIENT OUTREACH (OUTPATIENT)
Dept: CARE COORDINATION | Facility: CLINIC | Age: 45
End: 2025-07-01
Payer: COMMERCIAL

## 2025-07-01 NOTE — PROGRESS NOTES
Clinic Care Coordination Contact  [facility]/mail  Fishreynoldmario  ID [id number]    Clinical Data: Care Coordinator Outreach    Outreach Documentation Number of Outreach Attempt   6/17/2025  12:45 PM 1   7/1/2025   3:08 PM 2     Left message on patient's voicemail with call back information and requested return call.    Plan: Care Coordinator will do no further outreaches at this time. Routing to lead clinician CCSW, to review for graduation or additional outreach.    Anne Wu  Community Health Worker   Clinic Care Coordination  [facility]    Phone: [phone]

## 2025-07-09 ENCOUNTER — PATIENT OUTREACH (OUTPATIENT)
Dept: CARE COORDINATION | Facility: CLINIC | Age: 45
End: 2025-07-09
Payer: COMMERCIAL

## 2025-07-09 NOTE — PROGRESS NOTES
Clinic Care Coordination Contact    Situation: Patient chart reviewed by care coordinator.    Background: pt enrolled for support with social security    Assessment: CHW unable to reach pt after multiple attempts.     Plan/Recommendations: CCSW closed CCC encounter.     ELLA Hatch, [facility]  Social Work Care Coordinator

## (undated) DEVICE — SUCTION CANISTER MEDIVAC LINER 3000ML W/LID 65651-530

## (undated) DEVICE — CATH TRAY FOLEY SURESTEP 16FR WDRAIN BAG STLK LATEX A300316A

## (undated) DEVICE — BLADE KNIFE SURG 10 371110

## (undated) DEVICE — SU ETHILON 2-0 FS 18" 664H

## (undated) DEVICE — ESU PENCIL W/HOLSTER E2350H

## (undated) DEVICE — TUBE JEJUNOSTOMY LOW VOL ENFIT 12FR 8200-12LV

## (undated) DEVICE — DRAIN JACKSON PRATT RESERVOIR 100ML SU130-1305

## (undated) DEVICE — STPL RELOAD LINEAR CUT ENDOPATH ECHELON 60MM BLK GST60T

## (undated) DEVICE — SUCTION MINISQUAIR SMOKE EVAC CAPTURE DEVICE SQ20012-01

## (undated) DEVICE — BLADE KNIFE SURG 15 371115

## (undated) DEVICE — ESU GROUND PAD UNIVERSAL W/O CORD

## (undated) DEVICE — TUBING SUCTION MEDI-VAC SOFT 3/16"X20' N520A

## (undated) DEVICE — LINEN GOWN OVERSIZE 5408

## (undated) DEVICE — SOL NACL 0.9% IRRIG 1000ML BOTTLE 2F7124

## (undated) DEVICE — SU SILK 3-0 SH CR 8X18" C013D

## (undated) DEVICE — GOWN IMPERVIOUS ZONED LG

## (undated) DEVICE — SU SILK 2-0 SH 30" K833H

## (undated) DEVICE — DRAIN JACKSON PRATT 10MM FLAT 3/4 PERF

## (undated) DEVICE — DRAIN PENROSE 0.50"X18" LATEX FREE GR203

## (undated) DEVICE — DRAPE BREAST/CHEST 29420

## (undated) DEVICE — SU PDS II 0 CTX 60" Z990G

## (undated) DEVICE — DRAPE POUCH INSTRUMENT 1018

## (undated) DEVICE — SPONGE RAY-TEC 4X8" 7318

## (undated) DEVICE — SOL WATER IRRIG 1000ML BOTTLE 2F7114

## (undated) DEVICE — SU VICRYL 3-0 SH 27" J316H

## (undated) DEVICE — SU SILK 0 24" TIE SA76G

## (undated) DEVICE — GLOVE PROTEXIS W/NEU-THERA 6.5  2D73TE65

## (undated) DEVICE — LIGHT HANDLE X2

## (undated) DEVICE — DRAPE IOBAN INCISE 23X17" 6650EZ

## (undated) DEVICE — NDL 22GA 1.5"

## (undated) DEVICE — LINEN TOWEL PACK X5 5464

## (undated) DEVICE — ESU LIGASURE MARYLAND JAW OPEN VESSEL 23CM LF1923

## (undated) DEVICE — SU VICRYL 1 CTX CR 8X18" J765D

## (undated) DEVICE — GLOVE PROTEXIS W/NEU-THERA 7.5  2D73TE75

## (undated) DEVICE — SU SILK 2-0 TIE 24" SA75H

## (undated) DEVICE — STPL ENDO ARTICULATING 60MM EC60A

## (undated) DEVICE — SU VICRYL 4-0 PS-2 18" UND J496H

## (undated) DEVICE — SU SILK 3-0 SH CR 8X30" C017D

## (undated) DEVICE — PACK MAJOR SBA15MAFSI

## (undated) DEVICE — COVER TABLE POLY 65X90" 8186

## (undated) DEVICE — SU SILK 3-0 TIE 24" SA74H

## (undated) DEVICE — SU VICRYL 2-0 CT-1 27" J339H

## (undated) DEVICE — CLIP APPLIER 11" MED LIGACLIP MCM20

## (undated) DEVICE — ESU HOLDER LAP INST DISP PURPLE LONG 330MM H-PRO-330

## (undated) DEVICE — PREP CHLORAPREP 26ML TINTED ORANGE  260815

## (undated) RX ORDER — CEFAZOLIN SODIUM 1 G/3ML
INJECTION, POWDER, FOR SOLUTION INTRAMUSCULAR; INTRAVENOUS
Status: DISPENSED
Start: 2020-03-03

## (undated) RX ORDER — HYDROMORPHONE HYDROCHLORIDE 1 MG/ML
INJECTION, SOLUTION INTRAMUSCULAR; INTRAVENOUS; SUBCUTANEOUS
Status: DISPENSED
Start: 2020-03-03

## (undated) RX ORDER — CEFAZOLIN SODIUM 2 G/100ML
INJECTION, SOLUTION INTRAVENOUS
Status: DISPENSED
Start: 2020-03-03

## (undated) RX ORDER — FENTANYL CITRATE 50 UG/ML
INJECTION, SOLUTION INTRAMUSCULAR; INTRAVENOUS
Status: DISPENSED
Start: 2020-03-03